# Patient Record
Sex: FEMALE | Race: WHITE | NOT HISPANIC OR LATINO | Employment: FULL TIME | URBAN - METROPOLITAN AREA
[De-identification: names, ages, dates, MRNs, and addresses within clinical notes are randomized per-mention and may not be internally consistent; named-entity substitution may affect disease eponyms.]

---

## 2017-06-22 ENCOUNTER — OFFICE VISIT (OUTPATIENT)
Dept: FAMILY MEDICINE | Facility: CLINIC | Age: 47
End: 2017-06-22
Payer: COMMERCIAL

## 2017-06-22 VITALS
BODY MASS INDEX: 33.06 KG/M2 | HEART RATE: 77 BPM | WEIGHT: 205.7 LBS | RESPIRATION RATE: 18 BRPM | SYSTOLIC BLOOD PRESSURE: 142 MMHG | DIASTOLIC BLOOD PRESSURE: 88 MMHG | TEMPERATURE: 98.2 F | HEIGHT: 66 IN

## 2017-06-22 DIAGNOSIS — Z00.00 ROUTINE HISTORY AND PHYSICAL EXAMINATION OF ADULT: Primary | ICD-10-CM

## 2017-06-22 DIAGNOSIS — I10 BENIGN ESSENTIAL HYPERTENSION: ICD-10-CM

## 2017-06-22 DIAGNOSIS — Z83.3 FAMILY HISTORY OF DIABETES MELLITUS: ICD-10-CM

## 2017-06-22 DIAGNOSIS — J45.20 INTERMITTENT ASTHMA, UNCOMPLICATED: ICD-10-CM

## 2017-06-22 LAB
ALBUMIN UR-MCNC: 30 MG/DL
APPEARANCE UR: CLEAR
BACTERIA #/AREA URNS HPF: ABNORMAL /HPF
BILIRUB UR QL STRIP: NEGATIVE
COLOR UR AUTO: YELLOW
ERYTHROCYTE [DISTWIDTH] IN BLOOD BY AUTOMATED COUNT: 12.1 % (ref 10–15)
GLUCOSE UR STRIP-MCNC: NEGATIVE MG/DL
HBA1C MFR BLD: 5.2 % (ref 4.3–6)
HCT VFR BLD AUTO: 38.8 % (ref 35–47)
HGB BLD-MCNC: 13.4 G/DL (ref 11.7–15.7)
HGB UR QL STRIP: ABNORMAL
KETONES UR STRIP-MCNC: NEGATIVE MG/DL
LEUKOCYTE ESTERASE UR QL STRIP: NEGATIVE
MCH RBC QN AUTO: 30.1 PG (ref 26.5–33)
MCHC RBC AUTO-ENTMCNC: 34.5 G/DL (ref 31.5–36.5)
MCV RBC AUTO: 87 FL (ref 78–100)
NITRATE UR QL: NEGATIVE
NON-SQ EPI CELLS #/AREA URNS LPF: ABNORMAL /LPF
PH UR STRIP: 5.5 PH (ref 5–7)
PLATELET # BLD AUTO: 306 10E9/L (ref 150–450)
RBC # BLD AUTO: 4.45 10E12/L (ref 3.8–5.2)
RBC #/AREA URNS AUTO: ABNORMAL /HPF (ref 0–2)
SP GR UR STRIP: 1.02 (ref 1–1.03)
URN SPEC COLLECT METH UR: ABNORMAL
UROBILINOGEN UR STRIP-ACNC: 0.2 EU/DL (ref 0.2–1)
WBC # BLD AUTO: 10.6 10E9/L (ref 4–11)
WBC #/AREA URNS AUTO: ABNORMAL /HPF (ref 0–2)

## 2017-06-22 PROCEDURE — 90715 TDAP VACCINE 7 YRS/> IM: CPT | Performed by: FAMILY MEDICINE

## 2017-06-22 PROCEDURE — 80053 COMPREHEN METABOLIC PANEL: CPT | Performed by: FAMILY MEDICINE

## 2017-06-22 PROCEDURE — 83036 HEMOGLOBIN GLYCOSYLATED A1C: CPT | Performed by: FAMILY MEDICINE

## 2017-06-22 PROCEDURE — 84443 ASSAY THYROID STIM HORMONE: CPT | Performed by: FAMILY MEDICINE

## 2017-06-22 PROCEDURE — 99396 PREV VISIT EST AGE 40-64: CPT | Mod: 25 | Performed by: FAMILY MEDICINE

## 2017-06-22 PROCEDURE — 36415 COLL VENOUS BLD VENIPUNCTURE: CPT | Performed by: FAMILY MEDICINE

## 2017-06-22 PROCEDURE — 99212 OFFICE O/P EST SF 10 MIN: CPT | Mod: 25 | Performed by: FAMILY MEDICINE

## 2017-06-22 PROCEDURE — 90471 IMMUNIZATION ADMIN: CPT | Performed by: FAMILY MEDICINE

## 2017-06-22 PROCEDURE — 81001 URINALYSIS AUTO W/SCOPE: CPT | Performed by: FAMILY MEDICINE

## 2017-06-22 PROCEDURE — 85027 COMPLETE CBC AUTOMATED: CPT | Performed by: FAMILY MEDICINE

## 2017-06-22 RX ORDER — IRBESARTAN 150 MG/1
150 TABLET ORAL DAILY
Qty: 30 TABLET | Refills: 1 | Status: SHIPPED | OUTPATIENT
Start: 2017-06-22 | End: 2017-07-17

## 2017-06-22 NOTE — LETTER
My Asthma Action Plan  Name: Eugenia Santoyo   YOB: 1970  Date: 6/22/2017   My doctor: Bianca Simon MD   My clinic: [facility]        My Control Medicine: NONE  My Rescue Medicine: NONE   My Asthma Severity: intermittent  Avoid your asthma triggers: humidity and exercise or sports               GREEN ZONE   Good Control    I feel good    No cough or wheeze    Can work, sleep and play without asthma symptoms       Take your asthma control medicine every day.     1. If exercise triggers your asthma, take your rescue medication    15 minutes before exercise or sports, and    During exercise if you have asthma symptoms  2. Spacer to use with inhaler: If you have a spacer, make sure to use it with your inhaler             YELLOW ZONE Getting Worse  I have ANY of these:    I do not feel good    Cough or wheeze    Chest feels tight    Wake up at night   1. Keep taking your Green Zone medications  2. Start taking your rescue medicine:    every 20 minutes for up to 1 hour. Then every 4 hours for 24-48 hours.  3. If you stay in the Yellow Zone for more than 12-24 hours, contact your doctor.  4. If you do not return to the Green Zone in 12-24 hours or you get worse, start taking your oral steroid medicine if prescribed by your provider.           RED ZONE Medical Alert - Get Help  I have ANY of these:    I feel awful    Medicine is not helping    Breathing getting harder    Trouble walking or talking    Nose opens wide to breathe       1. Take your rescue medicine NOW  2. If your provider has prescribed an oral steroid medicine, start taking it NOW  3. Call your doctor NOW  4. If you are still in the Red Zone after 20 minutes and you have not reached your doctor:    Take your rescue medicine again and    Call 911 or go to the emergency room right away    See your regular doctor within 2 weeks of an Emergency Room or Urgent Care visit for follow-up treatment.        Electronically signed by:  Kira Wall, June 22, 2017    Annual Reminders:  Meet with Asthma Educator,  Flu Shot in the Fall, consider Pneumonia Vaccination for patients with asthma (aged 19 and older).    Pharmacy:    [id number] - [place], [place] - [address] AT [facility] OF  [address]  [id number] - [place], [place] - [address] AT [facility] OF [address]                    Asthma Triggers  How To Control Things That Make Your Asthma Worse    Triggers are things that make your asthma worse.  Look at the list below to help you find your triggers and what you can do about them.  You can help prevent asthma flare-ups by staying away from your triggers.      Trigger                                                          What you can do   Cigarette Smoke  Tobacco smoke can make asthma worse. Do not allow smoking in your home, car or around you.  Be sure no one smokes at a child s day care or school.  If you smoke, ask your health care provider for ways to help you quit.  Ask family members to quit too.  Ask your health care provider for a referral to Quit Plan to help you quit smoking, or call 6-059-190-PLAN.     Colds, Flu, Bronchitis  These are common triggers of asthma. Wash your hands often.  Don t touch your eyes, nose or mouth.  Get a flu shot every year.     Dust Mites  These are tiny bugs that live in cloth or carpet. They are too small to see. Wash sheets and blankets in hot water every week.   Encase pillows and mattress in dust mite proof covers.  Avoid having carpet if you can. If you have carpet, vacuum weekly.   Use a dust mask and HEPA vacuum.   Pollen and Outdoor Mold  Some people are allergic to trees, grass, or weed pollen, or molds. Try to keep your windows closed.  Limit time out doors when pollen count is high.   Ask you health care provider about taking medicine during allergy season.     Animal Dander  Some people are allergic to skin flakes, urine or saliva from  pets with fur or feathers. Keep pets with fur or feathers out of your home.    If you can t keep the pet outdoors, then keep the pet out of your bedroom.  Keep the bedroom door closed.  Keep pets off cloth furniture and away from stuffed toys.     Mice, Rats, and Cockroaches  Some people are allergic to the waste from these pests.   Cover food and garbage.  Clean up spills and food crumbs.  Store grease in the refrigerator.   Keep food out of the bedroom.   Indoor Mold  This can be a trigger if your home has high moisture. Fix leaking faucets, pipes, or other sources of water.   Clean moldy surfaces.  Dehumidify basement if it is damp and smelly.   Smoke, Strong Odors, and Sprays  These can reduce air quality. Stay away from strong odors and sprays, such as perfume, powder, hair spray, paints, smoke incense, paint, cleaning products, candles and new carpet.   Exercise or Sports  Some people with asthma have this trigger. Be active!  Ask your doctor about taking medicine before sports or exercise to prevent symptoms.    Warm up for 5-10 minutes before and after sports or exercise.     Other Triggers of Asthma  Cold air:  Cover your nose and mouth with a scarf.  Sometimes laughing or crying can be a trigger.  Some medicines and food can trigger asthma.

## 2017-06-22 NOTE — NURSING NOTE
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    Weight as of this encounter: 205 lb 11.2 oz (93.3 kg).  Medication Reconciliation: oliver Wall CMA      "

## 2017-06-22 NOTE — MR AVS SNAPSHOT
After Visit Summary   6/22/2017    Eugenia Santoyo    MRN: [id number]           Patient Information     Date Of Birth          1970        Visit Information        Provider Department      6/22/2017 3:30 PM Bianca Simon MD [facility]        Today's Diagnoses     Routine history and physical examination of adult    -  1    Benign essential hypertension        Intermittent asthma, uncomplicated        Family history of diabetes mellitus          Care Instructions    You may schedule your mammogram at [facility] by calling [phone] and asking to schedule your mammogram or you may schedule with [facility] [place] in [place] by calling [phone].            Follow-ups after your visit        Future tests that were ordered for you today     Open Future Orders        Priority Expected Expires Ordered    MA Screening Digital Bilateral Routine  6/22/2018 6/22/2017            Who to contact     If you have questions or need follow up information about today's clinic visit or your schedule please contact [facility] directly at [phone].  Normal or non-critical lab and imaging results will be communicated to you by SolvAxishart, letter or phone within 4 business days after the clinic has received the results. If you do not hear from us within 7 days, please contact the clinic through [email or web address] or phone. If you have a critical or abnormal lab result, we will notify you by phone as soon as possible.  Submit refill requests through griddig or call your pharmacy and they will forward the refill request to us. Please allow 3 business days for your refill to be completed.          Additional Information About Your Visit        SolvAxishart Information     griddig gives you secure access to your electronic health record. If you see a primary care provider, you can also send messages to your care team and make appointments. If you have questions,  "please call your primary care clinic.  If you do not have a primary care provider, please call [phone] and they will assist you.        Care EveryWhere ID     This is your Care EveryWhere ID. This could be used by other organizations to access your [place] medical records  [id number]        Your Vitals Were     Pulse Temperature Respirations Height Last Period BMI (Body Mass Index)    77 98.2  F (36.8  C) (Oral) 18 5' 6\" (1.676 m) 04/11/2017 33.2 kg/m2       Blood Pressure from Last 3 Encounters:   06/22/17 142/88   05/11/16 118/72   12/04/14 120/78    Weight from Last 3 Encounters:   06/22/17 205 lb 11.2 oz (93.3 kg)   05/11/16 194 lb (88 kg)   12/04/14 191 lb (86.6 kg)              We Performed the Following     *UA reflex to Microscopic     CBC with platelets     Comprehensive metabolic panel     Hemoglobin A1c     TDAP VACCINE (ADACEL)     TSH with free T4 reflex          Today's Medication Changes          These changes are accurate as of: 6/22/17  4:22 PM.  If you have any questions, ask your nurse or doctor.               Start taking these medicines.        Dose/Directions    irbesartan 150 MG tablet   Commonly known as:  AVAPRO   Used for:  Benign essential hypertension   Started by:  Bianca Simon MD        Dose:  150 mg   Take 1 tablet (150 mg) by mouth daily   Quantity:  30 tablet   Refills:  1            Where to get your medicines      These medications were sent to [facility] [address] [place] [address] [address]  AT [facility] [address]  [address] [facility] [address]     Phone:  [phone]     irbesartan 150 MG tablet                Primary Care Provider Office Phone # Fax #    Bianca Simon [phone]       [facility] [address] [facility] [address]        Equal Access to Services     RICARDO GAYTAN AH: Hadii aad ku hadashrobi Sopacoali, waaxda luqadaha, qaybta kaalmada adeegyada, shantell guajardo ah. So " [facility] [phone].    ATENCIÓN: Si jensenla jenny, tiene a loomis disposición servicios gratuitos de asistencia lingüística. Za umanzor [phone].    We comply with applicable federal civil rights laws and [place] laws. We do not discriminate on the basis of race, color, national origin, age, disability sex, sexual orientation or gender identity.            Thank you!     Thank you for choosing [facility]  for your care. Our goal is always to provide you with excellent care. Hearing back from our patients is one way we can continue to improve our services. Please take a few minutes to complete the written survey that you may receive in the mail after your visit with us. Thank you!             Your Updated Medication List - Protect others around you: Learn how to safely use, store and throw away your medicines at www.disposemymeds.org.          This list is accurate as of: 6/22/17  4:22 PM.  Always use your most recent med list.                   Brand Name Dispense Instructions for use Diagnosis    irbesartan 150 MG tablet    AVAPRO    30 tablet    Take 1 tablet (150 mg) by mouth daily    Benign essential hypertension

## 2017-06-22 NOTE — PATIENT INSTRUCTIONS
You may schedule your mammogram at [facility] by calling [phone] and asking to schedule your mammogram or you may schedule with [facility] [place] in [place] by calling [phone].

## 2017-06-22 NOTE — LETTER
My Asthma Action Plan  Name: Eugenia Santoyo   YOB: 1970  Date: 6/26/2017   My doctor: Bianca Simon MD   My clinic: [facility]        My Control Medicine: NONE  My Rescue Medicine: Albuterol (Proair/Ventolin/Proventil) inhaler as needed   My Asthma Severity: intermittent  Avoid your asthma triggers: upper respiratory infections               GREEN ZONE   Good Control    I feel good    No cough or wheeze    Can work, sleep and play without asthma symptoms       Take your asthma control medicine every day.     1. If exercise triggers your asthma, take your rescue medication    15 minutes before exercise or sports, and    During exercise if you have asthma symptoms  2. Spacer to use with inhaler: If you have a spacer, make sure to use it with your inhaler             YELLOW ZONE Getting Worse  I have ANY of these:    I do not feel good    Cough or wheeze    Chest feels tight    Wake up at night   1. Keep taking your Green Zone medications  2. Start taking your rescue medicine:    every 20 minutes for up to 1 hour. Then every 4 hours for 24-48 hours.  3. If you stay in the Yellow Zone for more than 12-24 hours, contact your doctor.  4. If you do not return to the Green Zone in 12-24 hours or you get worse, start taking your oral steroid medicine if prescribed by your provider.           RED ZONE Medical Alert - Get Help  I have ANY of these:    I feel awful    Medicine is not helping    Breathing getting harder    Trouble walking or talking    Nose opens wide to breathe       1. Take your rescue medicine NOW  2. If your provider has prescribed an oral steroid medicine, start taking it NOW  3. Call your doctor NOW  4. If you are still in the Red Zone after 20 minutes and you have not reached your doctor:    Take your rescue medicine again and    Call 911 or go to the emergency room right away    See your regular doctor within 2 weeks of an Emergency Room or Urgent Care visit for  follow-up treatment.        Electronically signed by: Bianca Simon, June 26, 2017    Annual Reminders:  Meet with Asthma Educator,  Flu Shot in the Fall, consider Pneumonia Vaccination for patients with asthma (aged 19 and older).    Pharmacy:    [id number] - [place], [place] - [address] [facility] OF  [address]  [id number] - [place], [place] - [address] AT [facility] OF [address]                    Asthma Triggers  How To Control Things That Make Your Asthma Worse    Triggers are things that make your asthma worse.  Look at the list below to help you find your triggers and what you can do about them.  You can help prevent asthma flare-ups by staying away from your triggers.      Trigger                                                          What you can do   Cigarette Smoke  Tobacco smoke can make asthma worse. Do not allow smoking in your home, car or around you.  Be sure no one smokes at a child s day care or school.  If you smoke, ask your health care provider for ways to help you quit.  Ask family members to quit too.  Ask your health care provider for a referral to Quit Plan to help you quit smoking, or call 2-531-482-PLAN.     Colds, Flu, Bronchitis  These are common triggers of asthma. Wash your hands often.  Don t touch your eyes, nose or mouth.  Get a flu shot every year.     Dust Mites  These are tiny bugs that live in cloth or carpet. They are too small to see. Wash sheets and blankets in hot water every week.   Encase pillows and mattress in dust mite proof covers.  Avoid having carpet if you can. If you have carpet, vacuum weekly.   Use a dust mask and HEPA vacuum.   Pollen and Outdoor Mold  Some people are allergic to trees, grass, or weed pollen, or molds. Try to keep your windows closed.  Limit time out doors when pollen count is high.   Ask you health care provider about taking medicine during allergy season.     Animal Dander  Some people  are allergic to skin flakes, urine or saliva from pets with fur or feathers. Keep pets with fur or feathers out of your home.    If you can t keep the pet outdoors, then keep the pet out of your bedroom.  Keep the bedroom door closed.  Keep pets off cloth furniture and away from stuffed toys.     Mice, Rats, and Cockroaches  Some people are allergic to the waste from these pests.   Cover food and garbage.  Clean up spills and food crumbs.  Store grease in the refrigerator.   Keep food out of the bedroom.   Indoor Mold  This can be a trigger if your home has high moisture. Fix leaking faucets, pipes, or other sources of water.   Clean moldy surfaces.  Dehumidify basement if it is damp and smelly.   Smoke, Strong Odors, and Sprays  These can reduce air quality. Stay away from strong odors and sprays, such as perfume, powder, hair spray, paints, smoke incense, paint, cleaning products, candles and new carpet.   Exercise or Sports  Some people with asthma have this trigger. Be active!  Ask your doctor about taking medicine before sports or exercise to prevent symptoms.    Warm up for 5-10 minutes before and after sports or exercise.     Other Triggers of Asthma  Cold air:  Cover your nose and mouth with a scarf.  Sometimes laughing or crying can be a trigger.  Some medicines and food can trigger asthma.

## 2017-06-22 NOTE — PROGRESS NOTES
Screening Questionnaire for Adult Immunization    Are you sick today?   No   Do you have allergies to medications, food, a vaccine component or latex?   YES- Imitrex   Have you ever had a serious reaction after receiving a vaccination?   No   Do you have a long-term health problem with heart disease, lung disease, asthma, kidney disease, metabolic disease (e.g. diabetes), anemia, or other blood disorder?   No   Do you have cancer, leukemia, HIV/AIDS, or any other immune system problem?   No   In the past 3 months, have you taken medications that affect  your immune system, such as prednisone, other steroids, or anticancer drugs; drugs for the treatment of rheumatoid arthritis, Crohn s disease, or psoriasis; or have you had radiation treatments?   No   Have you had a seizure, or a brain or other nervous system problem?   No   During the past year, have you received a transfusion of blood or blood     products, or been given immune (gamma) globulin or antiviral drug?   No   For women: Are you pregnant or is there a chance you could become        pregnant during the next month?   No   Have you received any vaccinations in the past 4 weeks?   No     Immunization questionnaire was positive for at least one answer.  MD aware.      MNVFC doesn't apply on this patient    Per orders of Dr. Simon, injection of TDAP given by Kira Wall. Patient instructed to remain in clinic for 20 minutes afterwards, and to report any adverse reaction to me immediately.       Screening performed by Kira Wall on 6/22/2017 at 4:45 PM.

## 2017-06-22 NOTE — PROGRESS NOTES
SUBJECTIVE:     CC: Eugenia Santoyo is an 47 year old woman who presents for preventive health visit.     Physical   Annual:     Getting at least 3 servings of Calcium per day::  NO    Bi-annual eye exam::  Yes    Dental care twice a year::  Yes    Sleep apnea or symptoms of sleep apnea::  None    Diet::  Regular (no restrictions)    Frequency of exercise::  2-3 days/week    Duration of exercise::  30-45 minutes    Taking medications regularly::  Not Applicable    Additional concerns today::  YES  Hypertension   Diet::  Regular (no restrictions)  Frequency of exercise::  2-3 days/week  Duration of exercise::  30-45 minutes  Taking medications regularly::  Not Applicable  Additional concerns today::  YES        PROBLEMS TO ADD ON...    Today's PHQ-2 Score:   PHQ-2 ( 1999 Pfizer) 6/19/2017   Q1: Little interest or pleasure in doing things 0   Q2: Feeling down, depressed or hopeless 0   PHQ-2 Score 0   Q1: Little interest or pleasure in doing things Not at all   Q2: Feeling down, depressed or hopeless Not at all   PHQ-2 Score 0       Abuse: Current or Past(Physical, Sexual or Emotional)- No  Do you feel safe in your environment - Yes    Social History   Substance Use Topics     Smoking status: Never Smoker     Smokeless tobacco: Never Used     Alcohol use Yes      Comment: occas     The patient does not drink >3 drinks per day nor >7 drinks per week.    Recent Labs   Lab Test  05/11/16   1023  07/09/14   1041   CHOL  191  215*   HDL  49*  69   LDL  106*  118   TRIG  182*  141   CHOLHDLRATIO   --   3.1   NHDL  142*   --        Reviewed orders with patient.  Reviewed health maintenance and updated orders accordingly - Yes    Subjective:  Eugenia Santoyo is a 47 year old female  who presents today for a Physical Exam.  She has NOT had an abnormal PAP smear.  She has had her cholesterol checked in the past and it was good in 2016.  Her last mammogram was in 2014. She has no concerns at this time.    No current outpatient  "prescriptions on file.       Past Medical History:   Diagnosis Date     Benign heart murmur     since childhood     Intermittent asthma      Migraines        Past Surgical History:   Procedure Laterality Date     D & C      after miscarriage       Family History   Problem Relation Age of Onset     CANCER Mother      CLL--Chronic lymphatic luekemia     C.A.D. Father      MI at  ~40yrs old     CANCER Maternal Grandmother      ? type of cancer,  50's       Social History   Substance Use Topics     Smoking status: Never Smoker     Smokeless tobacco: Never Used     Alcohol use Yes      Comment: occas       Objective:  Blood pressure 142/88, pulse 77, temperature 98.2  F (36.8  C), temperature source Oral, resp. rate 18, height 5' 6\" (1.676 m), weight 205 lb 11.2 oz (93.3 kg), last menstrual period 2017, not currently breastfeeding.  General appearance: Healthy.  Mental Status: cooperative, normal affect, no gross thought process defects.  HEENT:   Ears- Normal external canals and TMs  Eyes- PERRL, EOM's intact, fundi benign, sharp disc margins.  Throat- Normal   Neck- no carotid bruits noted  Nodes- Negative  Thyroid: Normal to palpation, no enlargement or nodules noted.  Breasts: Symmetric without mass tenderness or discharge.  Axillary nodes negative.  Lungs: Clear to auscultation bilaterally  Heart.: Normal rate and rhythm.  No murmurs, clicks or gallops.  Pulses:    R-4/4, PT-4/4, DP-4/4  Abdomen: BS active. Soft, non-tender, no masses or organomegaly.  Aorta normal. No bruits.  Genitalia: Normal female external genitalia without lesions.  Speculum:  Cervix normal,  Pap taken, Bimanual exam - normal size uterus, no adenexal mass or tenderness.  Extremities: Normal without edema  Reflexes:  Symmetric bilaterally at the patella  Skin: Clear and free of rash.    Assessment:  1. Eugenia Santoyo is a healthy 47 year old female here for health care maintainence issues.  2. hypertension - has been running " high at work too  3. Asthma - stable  4. Headache - none for 2 years  5. Family hx of diabetes    Plan:  1. A Pap smear was NOT obtained  2. Labs ordered per [facility] orders  3. Screening Mammogram was ordered  4. Will start avapro  5. The potential side effects of the prescription medication were discussed with the patient.  6. tdap today  7.  Follow up in 2 months and as needed      Bianca Simon MD  [facility]

## 2017-06-23 LAB
ALBUMIN SERPL-MCNC: 4 G/DL (ref 3.4–5)
ALP SERPL-CCNC: 107 U/L (ref 40–150)
ALT SERPL W P-5'-P-CCNC: 21 U/L (ref 0–50)
ANION GAP SERPL CALCULATED.3IONS-SCNC: 9 MMOL/L (ref 3–14)
AST SERPL W P-5'-P-CCNC: 16 U/L (ref 0–45)
BILIRUB SERPL-MCNC: 0.5 MG/DL (ref 0.2–1.3)
BUN SERPL-MCNC: 15 MG/DL (ref 7–30)
CALCIUM SERPL-MCNC: 9.3 MG/DL (ref 8.5–10.1)
CHLORIDE SERPL-SCNC: 105 MMOL/L (ref 94–109)
CO2 SERPL-SCNC: 24 MMOL/L (ref 20–32)
CREAT SERPL-MCNC: 0.73 MG/DL (ref 0.52–1.04)
GFR SERPL CREATININE-BSD FRML MDRD: 84 ML/MIN/1.7M2
GLUCOSE SERPL-MCNC: 100 MG/DL (ref 70–99)
POTASSIUM SERPL-SCNC: 3.8 MMOL/L (ref 3.4–5.3)
PROT SERPL-MCNC: 7.6 G/DL (ref 6.8–8.8)
SODIUM SERPL-SCNC: 138 MMOL/L (ref 133–144)
TSH SERPL DL<=0.005 MIU/L-ACNC: 0.72 MU/L (ref 0.4–4)

## 2017-06-23 ASSESSMENT — ASTHMA QUESTIONNAIRES: ACT_TOTALSCORE: 25

## 2017-06-26 PROBLEM — I10 BENIGN ESSENTIAL HYPERTENSION: Status: ACTIVE | Noted: 2017-06-26

## 2017-07-17 ENCOUNTER — HOSPITAL ENCOUNTER (OUTPATIENT)
Dept: MAMMOGRAPHY | Facility: CLINIC | Age: 47
Discharge: HOME | End: 2017-07-17
Attending: FAMILY MEDICINE | Admitting: FAMILY MEDICINE
Payer: COMMERCIAL

## 2017-07-17 ENCOUNTER — OFFICE VISIT (OUTPATIENT)
Dept: FAMILY MEDICINE | Facility: CLINIC | Age: 47
End: 2017-07-17
Payer: COMMERCIAL

## 2017-07-17 VITALS
RESPIRATION RATE: 14 BRPM | DIASTOLIC BLOOD PRESSURE: 81 MMHG | BODY MASS INDEX: 32.88 KG/M2 | HEIGHT: 66 IN | TEMPERATURE: 98.1 F | WEIGHT: 204.6 LBS | HEART RATE: 75 BPM | OXYGEN SATURATION: 97 % | SYSTOLIC BLOOD PRESSURE: 119 MMHG

## 2017-07-17 DIAGNOSIS — I10 BENIGN ESSENTIAL HYPERTENSION: ICD-10-CM

## 2017-07-17 DIAGNOSIS — Z00.00 ROUTINE HISTORY AND PHYSICAL EXAMINATION OF ADULT: ICD-10-CM

## 2017-07-17 PROCEDURE — G0202 SCR MAMMO BI INCL CAD: HCPCS

## 2017-07-17 PROCEDURE — 99213 OFFICE O/P EST LOW 20 MIN: CPT | Performed by: FAMILY MEDICINE

## 2017-07-17 RX ORDER — IRBESARTAN 150 MG/1
150 TABLET ORAL DAILY
Qty: 90 TABLET | Refills: 3 | Status: SHIPPED | OUTPATIENT
Start: 2017-07-17 | End: 2018-07-20

## 2017-07-17 NOTE — NURSING NOTE
"Chief Complaint   Patient presents with     Hypertension     recheck       Initial /81 (BP Location: Left arm, Patient Position: Chair, Cuff Size: Adult Large)  Pulse 75  Temp 98.1  F (36.7  C) (Oral)  Resp 14  Ht 5' 6\" (1.676 m)  Wt 204 lb 9.6 oz (92.8 kg)  LMP 04/11/2017  SpO2 97%  BMI 33.02 kg/m2 Estimated body mass index is 33.02 kg/(m^2) as calculated from the following:    Height as of this encounter: 5' 6\" (1.676 m).    Weight as of this encounter: 204 lb 9.6 oz (92.8 kg).  Medication Reconciliation: complete     Rafa Wall CMA      "

## 2017-07-17 NOTE — MR AVS SNAPSHOT
"              After Visit Summary   7/17/2017    Eugenia Santoyo    MRN: [id number]           Patient Information     Date Of Birth          1970        Visit Information        Provider Department      7/17/2017 1:15 PM Bianca Simon MD [facility]        Today's Diagnoses     Benign essential hypertension           Follow-ups after your visit        Who to contact     If you have questions or need follow up information about today's clinic visit or your schedule please contact [facility] directly at [phone].  Normal or non-critical lab and imaging results will be communicated to you by MyChart, letter or phone within 4 business days after the clinic has received the results. If you do not hear from us within 7 days, please contact the clinic through [facility] or phone. If you have a critical or abnormal lab result, we will notify you by phone as soon as possible.  Submit refill requests through [employer]Bridge or call your pharmacy and they will forward the refill request to us. Please allow 3 business days for your refill to be completed.          Additional Information About Your Visit        MyChart Information     [facility]Bridge gives you secure access to your electronic health record. If you see a primary care provider, you can also send messages to your care team and make appointments. If you have questions, please call your primary care clinic.  If you do not have a primary care provider, please call [phone] and they will assist you.        Care EveryWhere ID     This is your Care EveryWhere ID. This could be used by other organizations to access your [place] medical records  [id number]        Your Vitals Were     Pulse Temperature Respirations Height Last Period Pulse Oximetry    75 98.1  F (36.7  C) (Oral) 14 5' 6\" (1.676 m) 04/11/2017 97%    BMI (Body Mass Index)                   33.02 kg/m2            Blood Pressure from Last 3 Encounters:   07/17/17 119/81 "   06/22/17 142/88   05/11/16 118/72    Weight from Last 3 Encounters:   07/17/17 204 lb 9.6 oz (92.8 kg)   06/22/17 205 lb 11.2 oz (93.3 kg)   05/11/16 194 lb (88 kg)              Today, you had the following     No orders found for display         Where to get your medicines      These medications were sent to [facility] [id number] [facility] [address]  AT [facility] [address]  [address] [facility] [address]     Phone:  [phone]     irbesartan 150 MG tablet          Primary Care Provider Office Phone # Fax #    Bianca Simon [phone]       [facility] [address] [facility] [address]        Equal Access to Services     RICARDO GAYTAN : Hadii nona ku hadasho Soomaali, waaxda luqadaha, qaybta kaalmada adeegyada, waxay semajin hayaan brit guajardo . So [facility] [phone].    ATENCIÓN: Si habla español, tiene a loomis disposición servicios gratuitos de asistencia lingüística. Llame al [phone].    We comply with applicable federal civil rights laws and [place] laws. We do not discriminate on the basis of race, color, national origin, age, disability sex, sexual orientation or gender identity.            Thank you!     Thank you for choosing [facility]  for your care. Our goal is always to provide you with excellent care. Hearing back from our patients is one way we can continue to improve our services. Please take a few minutes to complete the written survey that you may receive in the mail after your visit with us. Thank you!             Your Updated Medication List - Protect others around you: Learn how to safely use, store and throw away your medicines at www.disposemymeds.org.          This list is accurate as of: 7/17/17  1:32 PM.  Always use your most recent med list.                   Brand Name Dispense Instructions for use Diagnosis    irbesartan 150 MG tablet    AVAPRO    90 tablet    Take 1 tablet (150 mg)  by mouth daily    Benign essential hypertension

## 2017-07-17 NOTE — PROGRESS NOTES
"  SUBJECTIVE:                                                    Eugenia Santoyo is a 47 year old female who presents to clinic today for the following health issues:      Hypertension Follow-up      Outpatient blood pressures are not being checked.    Low Salt Diet: no added salt      Amount of exercise or physical activity: 3 days per week. This past month she has not been able to exercise a lot due to  having surgery    Problems taking medications regularly: No    Medication side effects: none    Diet: regular (no restrictions)    Past Medical History:   Diagnosis Date     Benign heart murmur     since childhood     Intermittent asthma      Migraines        Past Surgical History:   Procedure Laterality Date     D & C      after miscarriage       MEDICATIONS:  Current Outpatient Prescriptions   Medication     irbesartan (AVAPRO) 150 MG tablet     No current facility-administered medications for this visit.        SOCIAL HISTORY:  Social History   Substance Use Topics     Smoking status: Never Smoker     Smokeless tobacco: Never Used     Alcohol use Yes      Comment: occas       Family History   Problem Relation Age of Onset     CANCER Mother      CLL--Chronic lymphatic luekemia     C.A.D. Father      MI at  ~40yrs old     CANCER Maternal Grandmother      ? type of cancer,  50's       Objective:  Blood pressure 119/81, pulse 75, temperature 98.1  F (36.7  C), temperature source Oral, resp. rate 14, height 5' 6\" (1.676 m), weight 204 lb 9.6 oz (92.8 kg), last menstrual period 2017, SpO2 97 %, not currently breastfeeding.  Neck:  There is no lymphadenopathy or thyroid tenderness or enlargement  Chest: Clear to auscultation bilaterally.  No wheezes, rales or retractions.  CV: Regular rate and rhythm without murmurs, rubs or gallops.  Extremities: There is no clubbing, cyanosis or edema.  Peripheral pulses are present bilaterally in the radial and dorsalis pedis arteries.'    Assessment:  1. " hypertension - under good control      Plan:  1. Refills per epicare  2. Recheck in 6-12 months

## 2018-07-20 DIAGNOSIS — I10 BENIGN ESSENTIAL HYPERTENSION: ICD-10-CM

## 2018-07-20 NOTE — TELEPHONE ENCOUNTER
"Requested Prescriptions   Pending Prescriptions Disp Refills     irbesartan (AVAPRO) 150 MG tablet [Pharmacy Med Name: IRBESARTAN 150MG TABLETS] 90 tablet 0    Last Written Prescription Date:  7/17/17  Last Fill Quantity: 90,  # refills: 3   Last Office Visit: 7/17/2017 Alfred  Future Office Visit:      Sig: TAKE 1 TABLET(150 MG) BY MOUTH DAILY    Angiotensin-II Receptors Failed    7/20/2018  3:26 AM       Failed - Blood pressure under 140/90 in past 12 months    BP Readings from Last 3 Encounters:   07/17/17 119/81   06/22/17 142/88   05/11/16 118/72                Failed - Recent (12 mo) or future (30 days) visit within the authorizing provider's specialty    Patient had office visit in the last 12 months or has a visit in the next 30 days with authorizing provider or within the authorizing provider's specialty.  See \"Patient Info\" tab in inbasket, or \"Choose Columns\" in Meds & Orders section of the refill encounter.           Failed - Normal serum creatinine on file in past 12 months    Recent Labs   Lab Test  06/22/17   1625   CR  0.73            Failed - Normal serum potassium on file in past 12 months    Recent Labs   Lab Test  06/22/17   1625   POTASSIUM  3.8                   Passed - Patient is age 18 or older       Passed - No active pregnancy on record       Passed - No positive pregnancy test in past 12 months          "

## 2018-07-20 NOTE — LETTER
July 23, 2018          Eugenia Santoyo  [address] [facility] [address]        Dear Eugenia,       We sent a one month refill of irbesartan to your pharmacy today. This is a reminder to schedule an office visit with your provider before the next refill. Your last appointment was 7/17/2017.    Taking care of your health is important to us and ongoing visits with a provider are vital to your care. Please let us know if you have any questions about this reminder.     Sincerely,     JARRELL Taveras - Care Team of Jac Valenzuela MD

## 2018-07-23 RX ORDER — IRBESARTAN 150 MG/1
TABLET ORAL
Qty: 30 TABLET | Refills: 0 | Status: SHIPPED | OUTPATIENT
Start: 2018-07-23 | End: 2018-08-20

## 2018-07-23 NOTE — TELEPHONE ENCOUNTER
Medication is being filled for 1 time refill only due to:  Patient needs to be seen because it has been more than one year since last visit.    Mail is preferred communication, no others checked  Letter mailed.   Nitin Mae RN

## 2018-08-20 ENCOUNTER — OFFICE VISIT (OUTPATIENT)
Dept: FAMILY MEDICINE | Facility: CLINIC | Age: 48
End: 2018-08-20
Payer: COMMERCIAL

## 2018-08-20 VITALS
SYSTOLIC BLOOD PRESSURE: 122 MMHG | HEIGHT: 65 IN | DIASTOLIC BLOOD PRESSURE: 78 MMHG | WEIGHT: 204.8 LBS | OXYGEN SATURATION: 98 % | HEART RATE: 63 BPM | TEMPERATURE: 98.4 F | RESPIRATION RATE: 14 BRPM | BODY MASS INDEX: 34.12 KG/M2

## 2018-08-20 DIAGNOSIS — Z00.00 ROUTINE GENERAL MEDICAL EXAMINATION AT A HEALTH CARE FACILITY: Primary | ICD-10-CM

## 2018-08-20 DIAGNOSIS — Z83.3 FAMILY HISTORY OF DIABETES MELLITUS: ICD-10-CM

## 2018-08-20 DIAGNOSIS — Z11.4 SCREENING FOR HIV (HUMAN IMMUNODEFICIENCY VIRUS): ICD-10-CM

## 2018-08-20 DIAGNOSIS — Z12.11 SCREEN FOR COLON CANCER: ICD-10-CM

## 2018-08-20 DIAGNOSIS — J45.20 MILD INTERMITTENT ASTHMA WITHOUT COMPLICATION: ICD-10-CM

## 2018-08-20 DIAGNOSIS — I10 BENIGN ESSENTIAL HYPERTENSION: ICD-10-CM

## 2018-08-20 DIAGNOSIS — G43.829 MENSTRUAL MIGRAINE WITHOUT STATUS MIGRAINOSUS, NOT INTRACTABLE: ICD-10-CM

## 2018-08-20 DIAGNOSIS — R63.5 WEIGHT GAIN: ICD-10-CM

## 2018-08-20 LAB — HBA1C MFR BLD: 5.2 % (ref 0–5.6)

## 2018-08-20 PROCEDURE — 99213 OFFICE O/P EST LOW 20 MIN: CPT | Mod: 25 | Performed by: FAMILY MEDICINE

## 2018-08-20 PROCEDURE — 84443 ASSAY THYROID STIM HORMONE: CPT | Performed by: FAMILY MEDICINE

## 2018-08-20 PROCEDURE — 99396 PREV VISIT EST AGE 40-64: CPT | Performed by: FAMILY MEDICINE

## 2018-08-20 PROCEDURE — 36415 COLL VENOUS BLD VENIPUNCTURE: CPT | Performed by: FAMILY MEDICINE

## 2018-08-20 PROCEDURE — 83036 HEMOGLOBIN GLYCOSYLATED A1C: CPT | Performed by: FAMILY MEDICINE

## 2018-08-20 PROCEDURE — 80061 LIPID PANEL: CPT | Performed by: FAMILY MEDICINE

## 2018-08-20 RX ORDER — IRBESARTAN 150 MG/1
TABLET ORAL
Qty: 90 TABLET | Refills: 3 | Status: SHIPPED | OUTPATIENT
Start: 2018-08-20 | End: 2019-08-22

## 2018-08-20 RX ORDER — TOPIRAMATE 25 MG/1
TABLET, FILM COATED ORAL
Qty: 70 TABLET | Refills: 0 | Status: SHIPPED | OUTPATIENT
Start: 2018-08-20 | End: 2018-10-31

## 2018-08-20 NOTE — MR AVS SNAPSHOT
After Visit Summary   8/20/2018    Eugenia Santoyo    MRN: [id number]           Patient Information     Date Of Birth          1970        Visit Information        Provider Department      8/20/2018 11:30 AM Bianca Simon MD [facility]        Today's Diagnoses     Routine general medical examination at a health care facility    -  1    Screen for colon cancer        Screening for HIV (human immunodeficiency virus)        Mild intermittent asthma without complication        Benign essential hypertension        Menstrual migraine without status migrainosus, not intractable        Family history of diabetes mellitus        Weight gain          Care Instructions      Preventive Health Recommendations  Female Ages 50 - 64    Yearly exam: See your health care provider every year in order to  o Review health changes.   o Discuss preventive care.    o Review your medicines if your doctor has prescribed any.      Get a Pap test every three years (unless you have an abnormal result and your provider advises testing more often).    If you get Pap tests with HPV test, you only need to test every 5 years, unless you have an abnormal result.     You do not need a Pap test if your uterus was removed (hysterectomy) and you have not had cancer.    You should be tested each year for STDs (sexually transmitted diseases) if you're at risk.     Have a mammogram every 1 to 2 years.    Have a colonoscopy at age 50, or have a yearly FIT test (stool test). These exams screen for colon cancer.      Have a cholesterol test every 5 years, or more often if advised.    Have a diabetes test (fasting glucose) every three years. If you are at risk for diabetes, you should have this test more often.     If you are at risk for osteoporosis (brittle bone disease), think about having a bone density scan (DEXA).    Shots: Get a flu shot each year. Get a tetanus shot every 10 years.    Nutrition:     Eat at least 5  servings of fruits and vegetables each day.    Eat whole-grain bread, whole-wheat pasta and brown rice instead of white grains and rice.    Get adequate Calcium and Vitamin D.     Lifestyle    Exercise at least 150 minutes a week (30 minutes a day, 5 days a week). This will help you control your weight and prevent disease.    Limit alcohol to one drink per day.    No smoking.     Wear sunscreen to prevent skin cancer.     See your dentist every six months for an exam and cleaning.    See your eye doctor every 1 to 2 years.      You may schedule your mammogram at [facility] by calling [phone] and asking to schedule your mammogram or you may schedule with [facility] in [place] by calling [phone].            Follow-ups after your visit        Follow-up notes from your care team     Return in about 3 months (around 11/20/2018) for Medication recheck.      Future tests that were ordered for you today     Open Future Orders        Priority Expected Expires Ordered    *MA Screening Digital Bilateral Routine  8/20/2019 8/20/2018            Who to contact     If you have questions or need follow up information about today's clinic visit or your schedule please contact [facility] directly at [phone].  Normal or non-critical lab and imaging results will be communicated to you by MyChart, letter or phone within 4 business days after the clinic has received the results. If you do not hear from us within 7 days, please contact the clinic through [facility] or phone. If you have a critical or abnormal lab result, we will notify you by phone as soon as possible.  Submit refill requests through [email or web address] or call your pharmacy and they will forward the refill request to us. Please allow 3 business days for your refill to be completed.          Additional Information About Your Visit        LY.com Information     [email or web address] gives you secure access to your electronic  "health record. If you see a primary care provider, you can also send messages to your care team and make appointments. If you have questions, please call your primary care clinic.  If you do not have a primary care provider, please call [phone] and they will assist you.        Care EveryWhere ID     This is your Care EveryWhere ID. This could be used by other organizations to access your [place] medical records  [id number]        Your Vitals Were     Pulse Temperature Respirations Height Last Period Pulse Oximetry    63 98.4  F (36.9  C) (Oral) 14 5' 5\" (1.651 m) 06/01/2018 98%    Breastfeeding? BMI (Body Mass Index)                No 34.08 kg/m2           Blood Pressure from Last 3 Encounters:   08/20/18 122/78   07/17/17 119/81   06/22/17 142/88    Weight from Last 3 Encounters:   08/20/18 204 lb 12.8 oz (92.9 kg)   07/17/17 204 lb 9.6 oz (92.8 kg)   06/22/17 205 lb 11.2 oz (93.3 kg)              We Performed the Following     Hemoglobin A1c     Lipid panel reflex to direct LDL Fasting     TSH with free T4 reflex          Today's Medication Changes          These changes are accurate as of 8/20/18 12:28 PM.  If you have any questions, ask your nurse or doctor.               Start taking these medicines.        Dose/Directions    topiramate 25 MG tablet   Commonly known as:  TOPAMAX   Used for:  Weight gain, Menstrual migraine without status migrainosus, not intractable   Started by:  Bianca Simon MD        Take 1 tablet (25 mg) at bedtime for 1 week, then 1 tablet twice daily for 1 week, then 1 tablet in AM and 2 in PM for 1 week, then 2 tablets twice daily.   Quantity:  70 tablet   Refills:  0            Where to get your medicines      These medications were sent to [facility] [address] [employer] [address] AT [facility] [address] [facility] [address]     Phone:  [phone]     irbesartan 150 MG tablet         Some of these will need a paper " prescription and others can be bought over the counter.  Ask your nurse if you have questions.     Bring a paper prescription for each of these medications     topiramate 25 MG tablet                Primary Care Provider Office Phone # Fax #    Bianca Simon -[phone] [address] [facility] [address]        Equal Access to Services     RICARDO GAYTAN : Hadii aad ku hadasho Soomaali, waaxda luqadaha, qaybta kaalmada adeegyada, waxay idiin hayaan adeeg kharash lacarolina . So [facility] [phone].    ATENCIÓN: Si habla español, tiene a loomis disposición servicios gratuitos de asistencia lingüística. Llame al [phone].    We comply with applicable federal civil rights laws and [place] laws. We do not discriminate on the basis of race, color, national origin, age, disability, sex, sexual orientation, or gender identity.            Thank you!     Thank you for choosing [facility]  for your care. Our goal is always to provide you with excellent care. Hearing back from our patients is one way we can continue to improve our services. Please take a few minutes to complete the written survey that you may receive in the mail after your visit with us. Thank you!             Your Updated Medication List - Protect others around you: Learn how to safely use, store and throw away your medicines at www.disposemymeds.org.          This list is accurate as of 8/20/18 12:28 PM.  Always use your most recent med list.                   Brand Name Dispense Instructions for use Diagnosis    irbesartan 150 MG tablet    AVAPRO    90 tablet    TAKE 1 TABLET(150 MG) BY MOUTH DAILY    Benign essential hypertension       topiramate 25 MG tablet    TOPAMAX    70 tablet    Take 1 tablet (25 mg) at bedtime for 1 week, then 1 tablet twice daily for 1 week, then 1 tablet in AM and 2 in PM for 1 week, then 2 tablets twice daily.    Weight gain, Menstrual migraine without status migrainosus, not intractable

## 2018-08-20 NOTE — LETTER
My Asthma Action Plan  Name: Eugenia Santoyo   YOB: 1970  Date: 8/20/2018   My doctor: Bianca Simon MD   My clinic: [facility]        My Control Medicine: None  My Rescue Medicine: none   My Asthma Severity: intermittent  Avoid your asthma triggers: smoke and upper respiratory infections               GREEN ZONE   Good Control    I feel good    No cough or wheeze    Can work, sleep and play without asthma symptoms       Take your asthma control medicine every day.     1. If exercise triggers your asthma, take your rescue medication    15 minutes before exercise or sports, and    During exercise if you have asthma symptoms  2. Spacer to use with inhaler: If you have a spacer, make sure to use it with your inhaler             YELLOW ZONE Getting Worse  I have ANY of these:    I do not feel good    Cough or wheeze    Chest feels tight    Wake up at night   1. Keep taking your Green Zone medications  2. Start taking your rescue medicine:    every 20 minutes for up to 1 hour. Then every 4 hours for 24-48 hours.  3. If you stay in the Yellow Zone for more than 12-24 hours, contact your doctor.  4. If you do not return to the Green Zone in 12-24 hours or you get worse, start taking your oral steroid medicine if prescribed by your provider.           RED ZONE Medical Alert - Get Help  I have ANY of these:    I feel awful    Medicine is not helping    Breathing getting harder    Trouble walking or talking    Nose opens wide to breathe       1. Take your rescue medicine NOW  2. If your provider has prescribed an oral steroid medicine, start taking it NOW  3. Call your doctor NOW  4. If you are still in the Red Zone after 20 minutes and you have not reached your doctor:    Take your rescue medicine again and    Call 911 or go to the emergency room right away    See your regular doctor within 2 weeks of an Emergency Room or Urgent Care visit for follow-up treatment.          Annual Reminders:   Meet with Asthma Educator,  Flu Shot in the Fall, consider Pneumonia Vaccination for patients with asthma (aged 19 and older).    Pharmacy:    [facility] - [place], [place] - [address] AT [facility] OF  [address]  [facility] - [place], [place] - [address] AT [facility] [address]                      Asthma Triggers  How To Control Things That Make Your Asthma Worse    Triggers are things that make your asthma worse.  Look at the list below to help you find your triggers and what you can do about them.  You can help prevent asthma flare-ups by staying away from your triggers.      Trigger                                                          What you can do   Cigarette Smoke  Tobacco smoke can make asthma worse. Do not allow smoking in your home, car or around you.  Be sure no one smokes at a child s day care or school.  If you smoke, ask your health care provider for ways to help you quit.  Ask family members to quit too.  Ask your health care provider for a referral to Quit Plan to help you quit smoking, or call 5-983-455-PLAN.     Colds, Flu, Bronchitis  These are common triggers of asthma. Wash your hands often.  Don t touch your eyes, nose or mouth.  Get a flu shot every year.     Dust Mites  These are tiny bugs that live in cloth or carpet. They are too small to see. Wash sheets and blankets in hot water every week.   Encase pillows and mattress in dust mite proof covers.  Avoid having carpet if you can. If you have carpet, vacuum weekly.   Use a dust mask and HEPA vacuum.   Pollen and Outdoor Mold  Some people are allergic to trees, grass, or weed pollen, or molds. Try to keep your windows closed.  Limit time out doors when pollen count is high.   Ask you health care provider about taking medicine during allergy season.     Animal Dander  Some people are allergic to skin flakes, urine or saliva from pets with fur or feathers. Keep pets with fur or  feathers out of your home.    If you can t keep the pet outdoors, then keep the pet out of your bedroom.  Keep the bedroom door closed.  Keep pets off cloth furniture and away from stuffed toys.     Mice, Rats, and Cockroaches  Some people are allergic to the waste from these pests.   Cover food and garbage.  Clean up spills and food crumbs.  Store grease in the refrigerator.   Keep food out of the bedroom.   Indoor Mold  This can be a trigger if your home has high moisture. Fix leaking faucets, pipes, or other sources of water.   Clean moldy surfaces.  Dehumidify basement if it is damp and smelly.   Smoke, Strong Odors, and Sprays  These can reduce air quality. Stay away from strong odors and sprays, such as perfume, powder, hair spray, paints, smoke incense, paint, cleaning products, candles and new carpet.   Exercise or Sports  Some people with asthma have this trigger. Be active!  Ask your doctor about taking medicine before sports or exercise to prevent symptoms.    Warm up for 5-10 minutes before and after sports or exercise.     Other Triggers of Asthma  Cold air:  Cover your nose and mouth with a scarf.  Sometimes laughing or crying can be a trigger.  Some medicines and food can trigger asthma.

## 2018-08-20 NOTE — PROGRESS NOTES
SUBJECTIVE:   CC: Eugenia Santoyo is an 48 year old woman who presents for preventive health visit.     Physical   Annual:     Getting at least 3 servings of Calcium per day:  NO    Bi-annual eye exam:  Yes    Dental care twice a year:  Yes    Sleep apnea or symptoms of sleep apnea:  None    Diet:  Regular (no restrictions)    Frequency of exercise:  4-5 days/week    Duration of exercise:  45-60 minutes    Taking medications regularly:  Yes    Medication side effects:  None    Additional concerns today:  No        Today's PHQ-2 Score:   PHQ-2 ( 1999 Pf[employer]) 8/19/2018   Q1: Little interest or pleasure in doing things 0   Q2: Feeling down, depressed or hopeless 0   PHQ-2 Score 0   Q1: Little interest or pleasure in doing things Not at all   Q2: Feeling down, depressed or hopeless Not at all   PHQ-2 Score 0       Abuse: Current or Past(Physical, Sexual or Emotional)- No  Do you feel safe in your environment - Yes    Social History   Substance Use Topics     Smoking status: Never Smoker     Smokeless tobacco: Never Used     Alcohol use Yes      Comment: occas     Alcohol Use 8/19/2018   If you drink alcohol do you typically have greater than 3 drinks per day OR greater than 7 drinks per week? No       Reviewed orders with patient.  Reviewed health maintenance and updated orders accordingly - Yes  Labs reviewed in [facility]    Patient over age 50, mutual decision to screen reflected in health maintenance.    Pertinent mammograms are reviewed under the imaging tab.  History of abnormal Pap smear: NO - age 30- 65 PAP every 3 years recommended  PAP / HPV Latest Ref Rng & Units 5/11/2016 9/21/2009 8/22/2007   PAP - NIL NIL NIL   HPV 16 DNA NEG Negative - -   HPV 18 DNA NEG Negative - -   OTHER HR HPV NEG Negative - -     Reviewed and updated as needed this visit by clinical staff  Tobacco  Allergies  Med Hx  Surg Hx  Fam Hx  Soc Hx        Reviewed and updated as needed this visit by Provider        Subjective:  Eugenia APPLE  "Yarelis is a 48 year old female  who presents today for a Physical Exam.  She has not had an abnormal PAP smear.  She has had her cholesterol checked in the past and it was borderline 2-3 years ago.  Her last mammogram was 2 years ago. She has no concerns at this time.    Current Outpatient Prescriptions   Medication Sig Dispense Refill     irbesartan (AVAPRO) 150 MG tablet TAKE 1 TABLET(150 MG) BY MOUTH DAILY 30 tablet 0       Past Medical History:   Diagnosis Date     Benign heart murmur     since childhood     Intermittent asthma      Migraines        Past Surgical History:   Procedure Laterality Date     D & C      after miscarriage       Family History   Problem Relation Age of Onset     Cancer Mother      CLL--Chronic lymphatic luekemia     C.A.D. Father      MI at  ~40yrs old     Cancer Maternal Grandmother      ? type of cancer,  50's       Social History   Substance Use Topics     Smoking status: Never Smoker     Smokeless tobacco: Never Used     Alcohol use Yes      Comment: occas       Objective:  Blood pressure 122/78, pulse 63, temperature 98.4  F (36.9  C), temperature source Oral, resp. rate 14, height 5' 5\" (1.651 m), weight 204 lb 12.8 oz (92.9 kg), last menstrual period 2018, SpO2 98 %, not currently breastfeeding. Body mass index is 34.08 kg/(m^2).   General appearance: Healthy.  Mental Status: cooperative, normal affect, no gross thought process defects.  HEENT:   Ears- Normal external canals and TMs  Eyes- PERRL, EOM's intact, fundi benign, sharp disc margins.  Throat- Normal   Neck- no carotid bruits noted  Nodes- Negative  Thyroid: Normal to palpation, no enlargement or nodules noted.  Breasts: Symmetric without mass tenderness or discharge.  Axillary nodes negative.  Lungs: Clear to auscultation bilaterally  Heart.: Normal rate and rhythm.  No murmurs, clicks or gallops.  Pulses:    R-4/4, PT-4/4, DP-4/4  Abdomen: BS active. Soft, non-tender, no masses or organomegaly.  Aorta " normal. No bruits.  Genitalia: Not due this time  Extremities: Normal without edema  Reflexes:  Symmetric bilaterally at the patella  Skin: Clear and free of rash.    Assessment:  1. Eugenia Santoyo is a healthy 48 year old female here for health care maintainence issues.  2. Weight gain and hard time losing weight  3. Migraines - have been better lately  4. Family hx of diabetes  5. Asthma - under control  6. hypertension = under good control     Plan:  1. A Pap smear was not obtained  2. Labs ordered per [facility] orders  3. Screening Mammogram was ordered  4. Refills per epicare  5. Will try topiramate  6. The potential side effects of the prescription medication were discussed with the patient.  7. Recheck in 3 months  8. A screening colonoscopy was recommended and referral offered to the patient.  9.  Patient is to follow-up in 1 year and as needed.      Review of Systems  CONSTITUTIONAL: NEGATIVE for fever, chills, change in weight  INTEGUMENTARU/SKIN: NEGATIVE for worrisome rashes, moles or lesions  EYES: NEGATIVE for vision changes or irritation  ENT: NEGATIVE for ear, mouth and throat problems  RESP: NEGATIVE for significant cough or SOB  BREAST: NEGATIVE for masses, tenderness or discharge  CV: NEGATIVE for chest pain, palpitations or peripheral edema  GI: NEGATIVE for nausea, abdominal pain, heartburn, or change in bowel habits  : NEGATIVE for unusual urinary or vaginal symptoms. Periods are regular.  MUSCULOSKELETAL: NEGATIVE for significant arthralgias or myalgia  NEURO: NEGATIVE for weakness, dizziness or paresthesias  PSYCHIATRIC: NEGATIVE for changes in mood or affect     Physical Exam    COUNSELING:  Reviewed preventive health counseling, as reflected in patient instructions  Special attention given to:        Regular exercise       Healthy diet/nutrition       Colon cancer screening    BP Readings from Last 1 Encounters:   07/17/17 119/81     Estimated body mass index is 33.02 kg/(m^2) as calculated  "from the following:    Height as of 7/17/17: 5' 6\" (1.676 m).    Weight as of 7/17/17: 204 lb 9.6 oz (92.8 kg).           reports that she has never smoked. She has never used smokeless tobacco.      Counseling Resources:  ATP IV Guidelines  Pooled Cohorts Equation Calculator  Breast Cancer Risk Calculator  FRAX Risk Assessment  ICSI Preventive Guidelines  Dietary Guidelines for Americans, 2010  USDA's MyPlate  ASA Prophylaxis  Lung CA Screening    Bianca Simon MD  [facility]  Answers for HPI/ROS submitted by the patient on 8/19/2018   PHQ-2 Score: 0    "

## 2018-08-20 NOTE — PATIENT INSTRUCTIONS
Preventive Health Recommendations  Female Ages 50 - 64    Yearly exam: See your health care provider every year in order to  o Review health changes.   o Discuss preventive care.    o Review your medicines if your doctor has prescribed any.      Get a Pap test every three years (unless you have an abnormal result and your provider advises testing more often).    If you get Pap tests with HPV test, you only need to test every 5 years, unless you have an abnormal result.     You do not need a Pap test if your uterus was removed (hysterectomy) and you have not had cancer.    You should be tested each year for STDs (sexually transmitted diseases) if you're at risk.     Have a mammogram every 1 to 2 years.    Have a colonoscopy at age 50, or have a yearly FIT test (stool test). These exams screen for colon cancer.      Have a cholesterol test every 5 years, or more often if advised.    Have a diabetes test (fasting glucose) every three years. If you are at risk for diabetes, you should have this test more often.     If you are at risk for osteoporosis (brittle bone disease), think about having a bone density scan (DEXA).    Shots: Get a flu shot each year. Get a tetanus shot every 10 years.    Nutrition:     Eat at least 5 servings of fruits and vegetables each day.    Eat whole-grain bread, whole-wheat pasta and brown rice instead of white grains and rice.    Get adequate Calcium and Vitamin D.     Lifestyle    Exercise at least 150 minutes a week (30 minutes a day, 5 days a week). This will help you control your weight and prevent disease.    Limit alcohol to one drink per day.    No smoking.     Wear sunscreen to prevent skin cancer.     See your dentist every six months for an exam and cleaning.    See your eye doctor every 1 to 2 years.      You may schedule your mammogram at [facility] by calling [phone] and asking to schedule your mammogram or you may schedule with [facility]  [place] in [place] by calling [phone].

## 2018-08-21 LAB
CHOLEST SERPL-MCNC: 217 MG/DL
HDLC SERPL-MCNC: 60 MG/DL
LDLC SERPL CALC-MCNC: 130 MG/DL
NONHDLC SERPL-MCNC: 157 MG/DL
TRIGL SERPL-MCNC: 135 MG/DL
TSH SERPL DL<=0.005 MIU/L-ACNC: 0.91 MU/L (ref 0.4–4)

## 2018-08-21 ASSESSMENT — ASTHMA QUESTIONNAIRES: ACT_TOTALSCORE: 25

## 2018-09-13 ENCOUNTER — HOSPITAL ENCOUNTER (OUTPATIENT)
Dept: MAMMOGRAPHY | Facility: CLINIC | Age: 48
Discharge: HOME | End: 2018-09-13
Attending: FAMILY MEDICINE | Admitting: FAMILY MEDICINE
Payer: COMMERCIAL

## 2018-09-13 DIAGNOSIS — Z00.00 ROUTINE GENERAL MEDICAL EXAMINATION AT A HEALTH CARE FACILITY: ICD-10-CM

## 2018-09-13 PROCEDURE — 77067 SCR MAMMO BI INCL CAD: CPT

## 2018-09-14 ENCOUNTER — MYC MEDICAL ADVICE (OUTPATIENT)
Dept: FAMILY MEDICINE | Facility: CLINIC | Age: 48
End: 2018-09-14

## 2018-09-14 DIAGNOSIS — R63.5 WEIGHT GAIN: ICD-10-CM

## 2018-09-14 DIAGNOSIS — G43.829 MENSTRUAL MIGRAINE WITHOUT STATUS MIGRAINOSUS, NOT INTRACTABLE: ICD-10-CM

## 2018-09-14 RX ORDER — TOPIRAMATE 50 MG/1
50 TABLET, FILM COATED ORAL 2 TIMES DAILY
Qty: 60 TABLET | Refills: 1 | Status: SHIPPED | OUTPATIENT
Start: 2018-09-14 | End: 2018-10-31

## 2018-09-21 ENCOUNTER — MYC MEDICAL ADVICE (OUTPATIENT)
Dept: FAMILY MEDICINE | Facility: CLINIC | Age: 48
End: 2018-09-21

## 2018-09-21 ENCOUNTER — OFFICE VISIT (OUTPATIENT)
Dept: URGENT CARE | Facility: URGENT CARE | Age: 48
End: 2018-09-21
Payer: COMMERCIAL

## 2018-09-21 VITALS
OXYGEN SATURATION: 100 % | SYSTOLIC BLOOD PRESSURE: 124 MMHG | HEART RATE: 72 BPM | DIASTOLIC BLOOD PRESSURE: 76 MMHG | BODY MASS INDEX: 33.99 KG/M2 | TEMPERATURE: 98 F | HEIGHT: 65 IN | WEIGHT: 204 LBS

## 2018-09-21 DIAGNOSIS — N92.4 EXCESSIVE BLEEDING IN PREMENOPAUSAL PERIOD: Primary | ICD-10-CM

## 2018-09-21 LAB
ERYTHROCYTE [DISTWIDTH] IN BLOOD BY AUTOMATED COUNT: 12.6 % (ref 10–15)
HCT VFR BLD AUTO: 35.6 % (ref 35–47)
HGB BLD-MCNC: 12 G/DL (ref 11.7–15.7)
MCH RBC QN AUTO: 29.1 PG (ref 26.5–33)
MCHC RBC AUTO-ENTMCNC: 33.7 G/DL (ref 31.5–36.5)
MCV RBC AUTO: 86 FL (ref 78–100)
PLATELET # BLD AUTO: 369 10E9/L (ref 150–450)
RBC # BLD AUTO: 4.13 10E12/L (ref 3.8–5.2)
WBC # BLD AUTO: 9.2 10E9/L (ref 4–11)

## 2018-09-21 PROCEDURE — 36415 COLL VENOUS BLD VENIPUNCTURE: CPT | Performed by: FAMILY MEDICINE

## 2018-09-21 PROCEDURE — 99213 OFFICE O/P EST LOW 20 MIN: CPT | Performed by: FAMILY MEDICINE

## 2018-09-21 PROCEDURE — 85027 COMPLETE CBC AUTOMATED: CPT | Performed by: FAMILY MEDICINE

## 2018-09-21 PROCEDURE — 83001 ASSAY OF GONADOTROPIN (FSH): CPT | Performed by: FAMILY MEDICINE

## 2018-09-21 NOTE — PROGRESS NOTES
"SUBJECTIVE:  Chief Complaint   Patient presents with     Urgent Care     Vaginal Bleeding     Heavy beleeding, has had in the past      Eugenia Santoyo is a 48 year old female who presents with vaginal bleeding.    Onset of symptoms 4 day(s) ago, unchanged since initiation      Pain:- manageable.     Vaginal bleeding: Yes  Heavy-  She needs to change her pad hourly-   Little amount of clots-  Some lightheadedness    Vaginal symptoms: none      Hx of previous symptom: - yes had heavy menses about 3 years-  Noted to have \"odd\" shape uterus as possible cause,  Was considering treatment, but the heavy periods stopped and no further evaluation.  She then had no menses for 1 year,  Then an episode of spotting.  Then another year with no menses.  Then normal menses 1 month ago.  She was started on topiramate 1 month ago with reported side effect of menstrual bleeding  Has had FSH tested 2 x-  Last 2016-  Both times levels were low    Last episode of heavy bleeding she took Rx of double dose birth control pills-  She found the nausea fairly intolerable    Past Medical History:   Diagnosis Date     Benign heart murmur     since childhood     Intermittent asthma      Migraines      Patient Active Problem List   Diagnosis     Fatigue     Heart Murmur, benign     Intermittent asthma     CARDIOVASCULAR SCREENING; LDL GOAL LESS THAN 160     Menstrual migraine without status migrainosus, not intractable     Benign essential hypertension       ALLERGIES:  Imitrex [sumatriptan succinate]      Current Outpatient Prescriptions on File Prior to Visit:  irbesartan (AVAPRO) 150 MG tablet TAKE 1 TABLET(150 MG) BY MOUTH DAILY   topiramate (TOPAMAX) 25 MG tablet Take 1 tablet (25 mg) at bedtime for 1 week, then 1 tablet twice daily for 1 week, then 1 tablet in AM and 2 in PM for 1 week, then 2 tablets twice daily. (Patient not taking: Reported on 9/21/2018)   topiramate (TOPAMAX) 50 MG tablet Take 1 tablet (50 mg) by mouth 2 times daily " "    No current facility-administered medications on file prior to visit.     Social History   Substance Use Topics     Smoking status: Never Smoker     Smokeless tobacco: Never Used     Alcohol use Yes      Comment: occas       Family History   Problem Relation Age of Onset     Cancer Mother      CLL--Chronic lymphatic luekemia     C.A.D. Father      MI at  ~40yrs old     Cancer Maternal Grandmother      ? type of cancer,  50's         ROS:   CONSTITUTIONAL:NEGATIVE for fever, chills,    INTEGUMENTARY/SKIN: NEGATIVE for worrisome rashes   EYES: NEGATIVE for vision changes or irritation  ENT/MOUTH: NEGATIVE for ear, mouth and throat problems  RESP:NEGATIVE for significant cough or SOB    OBJECTIVE:  /76  Pulse 72  Temp 98  F (36.7  C) (Oral)  Ht 5' 5\" (1.651 m)  Wt 204 lb (92.5 kg)  SpO2 100%  BMI 33.95 kg/m2  : deferred  GENERAL APPEARANCE: healthy, alert and no distress  CV: regular rates and rhythm, normal S1 S2, no murmur noted  ABDOMEN:  Soft, , no HSM or masses and bowel sounds normal,  Mild suprapubic tenderness  BACK: No CVA tenderness  SKIN: no suspicious lesions or rashes    Results for orders placed or performed in visit on 18   CBC with platelets   Result Value Ref Range    WBC 9.2 4.0 - 11.0 10e9/L    RBC Count 4.13 3.8 - 5.2 10e12/L    Hemoglobin 12.0 11.7 - 15.7 g/dL    Hematocrit 35.6 35.0 - 47.0 %    MCV 86 78 - 100 fl    MCH 29.1 26.5 - 33.0 pg    MCHC 33.7 31.5 - 36.5 g/dL    RDW 12.6 10.0 - 15.0 %    Platelet Count 369 150 - 450 10e9/L         ASSESSMENT:  Excessive bleeding in premenopausal period     - CBC with platelets  - Follicle stimulating hormone    Discussed that though she needs frequent pad changes, the serum blood levels are in a normal range- and normal WBC's and platelets, so normal function of bone marrow  Though inconvenient,  The bleeding usually stops without treatment-  She did not want to repeat taking birth control  Based on her age she is likely " having perimenopausal irregular bleeding-    Discuss with primary care if she wants to continue the Topirimate  She did not need any kind of work note

## 2018-09-21 NOTE — TELEPHONE ENCOUNTER
topiramate can affect the absorption of the birth control pill and in that way can affect a woman's cycles but since she is not on the OCP, this should not have anything to do with it.   If her bleeding is really heavy she should go to UC.   If she can wait we can see her in clinic next week

## 2018-09-21 NOTE — MR AVS SNAPSHOT
"              After Visit Summary   9/21/2018    Eugenia Santoyo    MRN: [id number]           Patient Information     Date Of Birth          1970        Visit Information        Provider Department      9/21/2018 2:40 PM Nelly Beckwith MD [facility]        Today's Diagnoses     Excessive bleeding in premenopausal period    -  1       Follow-ups after your visit        Who to contact     If you have questions or need follow up information about today's clinic visit or your schedule please contact [facility] directly at [phone].  Normal or non-critical lab and imaging results will be communicated to you by [employer], letter or phone within 4 business days after the clinic has received the results. If you do not hear from us within 7 days, please contact the clinic through [employer] or phone. If you have a critical or abnormal lab result, we will notify you by phone as soon as possible.  Submit refill requests through [employer] or call your pharmacy and they will forward the refill request to us. Please allow 3 business days for your refill to be completed.          Additional Information About Your Visit        MyChart Information     [facility] gives you secure access to your electronic health record. If you see a primary care provider, you can also send messages to your care team and make appointments. If you have questions, please call your primary care clinic.  If you do not have a primary care provider, please call [phone] and they will assist you.        Care EveryWhere ID     This is your Care EveryWhere ID. This could be used by other organizations to access your [facility] medical records  [id number]        Your Vitals Were     Pulse Temperature Height Pulse Oximetry BMI (Body Mass Index)       72 98  F (36.7  C) (Oral) 5' 5\" (1.651 m) 100% 33.95 kg/m2        Blood Pressure from Last 3 Encounters:   09/21/18 124/76   08/20/18 122/78   07/17/17 119/81    Weight " from Last 3 Encounters:   09/21/18 204 lb (92.5 kg)   08/20/18 204 lb 12.8 oz (92.9 kg)   07/17/17 204 lb 9.6 oz (92.8 kg)              We Performed the Following     CBC with platelets     Follicle stimulating hormone        Primary Care Provider Office Phone # Fax #    Bianca LIZ Simon [phone] [address] [facility] [address]        Equal Access to Services     RICARDO GAYTAN : Hadii aad ku hadasho Soomaali, waaxda luqadaha, qaybta kaalmada adeegyada, waxay idiin hayaan adeeg bryannaarasilvino lacarolina . So [facility] [phone].    ATENCIÓN: Si habla español, tiene a loomis disposición servicios gratuitos de asistencia lingüística. [facility] [phone].    We comply with applicable federal civil rights laws and [place] laws. We do not discriminate on the basis of race, color, national origin, age, disability, sex, sexual orientation, or gender identity.            Thank you!     Thank you for choosing [facility]  for your care. Our goal is always to provide you with excellent care. Hearing back from our patients is one way we can continue to improve our services. Please take a few minutes to complete the written survey that you may receive in the mail after your visit with us. Thank you!             Your Updated Medication List - Protect others around you: Learn how to safely use, store and throw away your medicines at www.disposemymeds.org.          This list is accurate as of 9/21/18  4:51 PM.  Always use your most recent med list.                   Brand Name Dispense Instructions for use Diagnosis    irbesartan 150 MG tablet    AVAPRO    90 tablet    TAKE 1 TABLET(150 MG) BY MOUTH DAILY    Benign essential hypertension       * topiramate 25 MG tablet    TOPAMAX    70 tablet    Take 1 tablet (25 mg) at bedtime for 1 week, then 1 tablet twice daily for 1 week, then 1 tablet in AM and 2 in PM for 1 week, then 2 tablets twice daily.    Weight gain, Menstrual migraine without status  migrainosus, not intractable       * topiramate 50 MG tablet    TOPAMAX    60 tablet    Take 1 tablet (50 mg) by mouth 2 times daily    Weight gain, Menstrual migraine without status migrainosus, not intractable       * Notice:  This list has 2 medication(s) that are the same as other medications prescribed for you. Read the directions carefully, and ask your doctor or other care provider to review them with you.

## 2018-09-22 LAB — FSH SERPL-ACNC: 20.4 IU/L

## 2018-10-31 ENCOUNTER — OFFICE VISIT (OUTPATIENT)
Dept: FAMILY MEDICINE | Facility: CLINIC | Age: 48
End: 2018-10-31
Payer: COMMERCIAL

## 2018-10-31 VITALS
HEIGHT: 65 IN | HEART RATE: 64 BPM | SYSTOLIC BLOOD PRESSURE: 118 MMHG | WEIGHT: 199.6 LBS | TEMPERATURE: 97.9 F | OXYGEN SATURATION: 97 % | DIASTOLIC BLOOD PRESSURE: 76 MMHG | BODY MASS INDEX: 33.26 KG/M2 | RESPIRATION RATE: 14 BRPM

## 2018-10-31 DIAGNOSIS — R63.5 WEIGHT GAIN: ICD-10-CM

## 2018-10-31 DIAGNOSIS — Z23 NEED FOR PROPHYLACTIC VACCINATION AND INOCULATION AGAINST INFLUENZA: Primary | ICD-10-CM

## 2018-10-31 DIAGNOSIS — G43.829 MENSTRUAL MIGRAINE WITHOUT STATUS MIGRAINOSUS, NOT INTRACTABLE: ICD-10-CM

## 2018-10-31 PROCEDURE — 99213 OFFICE O/P EST LOW 20 MIN: CPT | Performed by: FAMILY MEDICINE

## 2018-10-31 PROCEDURE — 90471 IMMUNIZATION ADMIN: CPT | Performed by: FAMILY MEDICINE

## 2018-10-31 RX ORDER — TOPIRAMATE 50 MG/1
50 TABLET, FILM COATED ORAL 2 TIMES DAILY
Qty: 180 TABLET | Refills: 1 | Status: SHIPPED | OUTPATIENT
Start: 2018-10-31 | End: 2019-04-29

## 2018-10-31 NOTE — PROGRESS NOTES
Injectable Influenza Immunization Documentation    1.  Is the person to be vaccinated sick today?   No    2. Does the person to be vaccinated have an allergy to a component   of the vaccine?   No  Egg Allergy Algorithm Link    3. Has the person to be vaccinated ever had a serious reaction   to influenza vaccine in the past?   No    4. Has the person to be vaccinated ever had Guillain-Barré syndrome?   No    Form completed by Mary Carmen Mcarthur CMA on 10/31/2018 at 12:16 PM

## 2018-10-31 NOTE — PROGRESS NOTES
"  SUBJECTIVE:   Eugenia Santoyo is a 48 year old female who presents to clinic today for the following health issues:    She has been on topamax for about 2 months and she has had some side effects of tingling so far.   She has lost about 6 lbs since started.  She was placed on it for weight loss.       She got her period in September and she had very long period and she went to  for this.   It lasted over a week and was heavy.   Then her last one was more normal.       Medication Followup of Topamax    Taking Medication as prescribed: yes    Side Effects:  Tinggling in hands and feet , little foggy feeling at times    Medication Helping Symptoms:  yes     Past Medical History:   Diagnosis Date     Benign heart murmur     since childhood     Intermittent asthma      Migraines        Past Surgical History:   Procedure Laterality Date     D & C      after miscarriage       MEDICATIONS:  Current Outpatient Prescriptions   Medication     irbesartan (AVAPRO) 150 MG tablet     topiramate (TOPAMAX) 50 MG tablet     topiramate (TOPAMAX) 25 MG tablet     No current facility-administered medications for this visit.        SOCIAL HISTORY:  Social History   Substance Use Topics     Smoking status: Never Smoker     Smokeless tobacco: Never Used     Alcohol use Yes      Comment: occas       Family History   Problem Relation Age of Onset     Cancer Mother      CLL--Chronic lymphatic luekemia     C.A.D. Father      MI at  ~40yrs old     Cancer Maternal Grandmother      ? type of cancer,  50's       Objective:  Blood pressure 118/76, pulse 64, temperature 97.9  F (36.6  C), temperature source Oral, resp. rate 14, height 5' 5\" (1.651 m), weight 199 lb 9.6 oz (90.5 kg), last menstrual period 10/15/2018, SpO2 97 %, not currently breastfeeding.  Neck:  There is no lymphadenopathy or thyroid tenderness or enlargement  Chest: Clear to auscultation bilaterally.  No wheezes, rales or retractions.  CV: Regular rate and rhythm " without murmurs, rubs or gallops.  Extremities: There is no clubbing, cyanosis or edema.  Peripheral pulses are present bilaterally in the radial and dorsalis pedis arteries.      Assessment:  1. Weight gain  2. HCM - needs colonoscopy      Plan:  1. Flu shot  2. Continue meds at same doses  3. Refills per [facility]  4. Recheck in 6 months

## 2018-10-31 NOTE — MR AVS SNAPSHOT
"              After Visit Summary   10/31/2018    Eugenia Santoyo    MRN: [id number]           Patient Information     Date Of Birth          1970        Visit Information        Provider Department      10/31/2018 11:15 AM Bianca Simon MD [facility]        Today's Diagnoses     Weight gain        Menstrual migraine without status migrainosus, not intractable           Follow-ups after your visit        Follow-up notes from your care team     Return in about 6 months (around 4/30/2019).      Who to contact     If you have questions or need follow up information about today's clinic visit or your schedule please contact [facility] directly at [phone].  Normal or non-critical lab and imaging results will be communicated to you by MyChart, letter or phone within 4 business days after the clinic has received the results. If you do not hear from us within 7 days, please contact the clinic through [employer] or phone. If you have a critical or abnormal lab result, we will notify you by phone as soon as possible.  Submit refill requests through [employer] or call your pharmacy and they will forward the refill request to us. Please allow 3 business days for your refill to be completed.          Additional Information About Your Visit        MyChart Information     [employer] gives you secure access to your electronic health record. If you see a primary care provider, you can also send messages to your care team and make appointments. If you have questions, please call your primary care clinic.  If you do not have a primary care provider, please call [phone] and they will assist you.        Care EveryWhere ID     This is your Care EveryWhere ID. This could be used by other organizations to access your [place] medical records  [id number]        Your Vitals Were     Pulse Temperature Respirations Height Last Period Pulse Oximetry    64 97.9  F (36.6  C) (Oral) 14 5' 5\" (1.651 m) " 10/15/2018 (Exact Date) 97%    Breastfeeding? BMI (Body Mass Index)                No 33.22 kg/m2           Blood Pressure from Last 3 Encounters:   10/31/18 118/76   09/21/18 124/76   08/20/18 122/78    Weight from Last 3 Encounters:   10/31/18 199 lb 9.6 oz (90.5 kg)   09/21/18 204 lb (92.5 kg)   08/20/18 204 lb 12.8 oz (92.9 kg)              Today, you had the following     No orders found for display         Where to get your medicines      Some of these will need a paper prescription and others can be bought over the counter.  Ask your nurse if you have questions.     Bring a paper prescription for each of these medications     topiramate 50 MG tablet          Primary Care Provider Office Phone # Fax #    Bianca Simon [phone] [address] [facility] [address]        Equal Access to Services     [facility]OR[facility] : Hadii nona flowero Solaron, waaxda luqadaha, qaybta kaalmada ademonaeyablayne, shantell guajardo . So [facility] [phone].    ATENCIÓN: Si habla español, tiene a loomis disposición servicios gratuitos de asistencia lingüística. Llame al [phone].    We comply with applicable federal civil rights laws and [place] laws. We do not discriminate on the basis of race, color, national origin, age, disability, sex, sexual orientation, or gender identity.            Thank you!     Thank you for choosing [facility]  for your care. Our goal is always to provide you with excellent care. Hearing back from our patients is one way we can continue to improve our services. Please take a few minutes to complete the written survey that you may receive in the mail after your visit with us. Thank you!             Your Updated Medication List - Protect others around you: Learn how to safely use, store and throw away your medicines at www.disposemymeds.org.          This list is accurate as of 10/31/18 12:08 PM.  Always use your most recent med list.                    Brand Name Dispense Instructions for use Diagnosis    irbesartan 150 MG tablet    AVAPRO    90 tablet    TAKE 1 TABLET(150 MG) BY MOUTH DAILY    Benign essential hypertension       topiramate 50 MG tablet    TOPAMAX    180 tablet    Take 1 tablet (50 mg) by mouth 2 times daily    Weight gain, Menstrual migraine without status migrainosus, not intractable

## 2018-10-31 NOTE — NURSING NOTE
Prior to injection verified patient identity using patient's name and date of birth.  Due to injection administration, patient instructed to remain in clinic for 15 minutes  afterwards, and to report any adverse reaction to me immediately.    Mary Carmen Mcarthur, CMA on 10/31/2018 at 12:17 PM

## 2018-11-11 DIAGNOSIS — G43.829 MENSTRUAL MIGRAINE WITHOUT STATUS MIGRAINOSUS, NOT INTRACTABLE: ICD-10-CM

## 2018-11-11 DIAGNOSIS — R63.5 WEIGHT GAIN: ICD-10-CM

## 2018-11-11 NOTE — TELEPHONE ENCOUNTER
"Requested Prescriptions   Pending Prescriptions Disp Refills     topiramate (TOPAMAX) 50 MG tablet [Pharmacy Med Name: TOPIRAMATE 50MG TABLETS]  Last Written Prescription Date:  10/31/2018  Last Fill Quantity: 180 tablet,  # refills: 1   Last office visit: 10/31/2018 with prescribing provider:  Alfred   Future Office Visit:     60 tablet 0     Sig: TAKE 1 TABLET BY MOUTH TWICE DAILY    Anti-Seizure Meds Protocol  Failed    11/11/2018  3:26 AM       Failed - Review Authorizing provider's last note.     Refer to last progress notes: confirm request is for original authorizing provider (cannot be through other providers).         Passed - Recent (12 mo) or future (30 days) visit within the authorizing provider's specialty    Patient had office visit in the last 12 months or has a visit in the next 30 days with authorizing provider or within the authorizing provider's specialty.  See \"Patient Info\" tab in inbasket, or \"Choose Columns\" in Meds & Orders section of the refill encounter.             Passed - Normal CBC on file in past 26 months    Recent Labs   Lab Test  09/21/18   1505   WBC  9.2   RBC  4.13   HGB  12.0   HCT  35.6   PLT  369                Passed - Normal ALT or AST on file in past 26 months    Recent Labs   Lab Test  06/22/17   1625   ALT  21     Recent Labs   Lab Test  06/22/17   1625   AST  16            Passed - Normal platelet count on file in past 26 months    Recent Labs   Lab Test  09/21/18   1505   PLT  369              Passed - No active pregnancy on record       Passed - No positive pregnancy test in last 12 months          "

## 2018-11-13 RX ORDER — TOPIRAMATE 50 MG/1
TABLET, FILM COATED ORAL
Qty: 60 TABLET | Refills: 0 | OUTPATIENT
Start: 2018-11-13

## 2018-11-13 NOTE — TELEPHONE ENCOUNTER
Duplicate- sent - info sent to pharmacy.  Guillermina Dempsey,RN  [facility]  [phone]

## 2019-04-24 ENCOUNTER — MYC MEDICAL ADVICE (OUTPATIENT)
Dept: FAMILY MEDICINE | Facility: CLINIC | Age: 49
End: 2019-04-24

## 2019-04-29 ENCOUNTER — OFFICE VISIT (OUTPATIENT)
Dept: FAMILY MEDICINE | Facility: CLINIC | Age: 49
End: 2019-04-29
Payer: COMMERCIAL

## 2019-04-29 VITALS
DIASTOLIC BLOOD PRESSURE: 68 MMHG | OXYGEN SATURATION: 100 % | HEART RATE: 76 BPM | TEMPERATURE: 97.8 F | HEIGHT: 66 IN | SYSTOLIC BLOOD PRESSURE: 112 MMHG | WEIGHT: 190 LBS | BODY MASS INDEX: 30.53 KG/M2

## 2019-04-29 DIAGNOSIS — R63.5 WEIGHT GAIN: ICD-10-CM

## 2019-04-29 DIAGNOSIS — G43.829 MENSTRUAL MIGRAINE WITHOUT STATUS MIGRAINOSUS, NOT INTRACTABLE: ICD-10-CM

## 2019-04-29 PROCEDURE — 99213 OFFICE O/P EST LOW 20 MIN: CPT | Performed by: FAMILY MEDICINE

## 2019-04-29 RX ORDER — TOPIRAMATE 50 MG/1
TABLET, FILM COATED ORAL
Qty: 270 TABLET | Refills: 1 | Status: SHIPPED | OUTPATIENT
Start: 2019-04-29 | End: 2019-10-19

## 2019-04-29 ASSESSMENT — MIFFLIN-ST. JEOR: SCORE: 1493.58

## 2019-04-29 NOTE — PROGRESS NOTES
"  SUBJECTIVE:   Eugenia Santoyo is a 49 year old female who presents to clinic today for the following   health issues:    She is doing well but the weight came off about 15 lbs and now it seems like it has plateaud.   She is still having some tingling but other than that no side effects.  She is exercising 5-6 days per week.   She has .   She is doing cardio as well.   Her eating was not good over the winter.            Medication Followup of  Topamax    Taking Medication as prescribed: yes    Side Effects:  Intermittent - Tingling with hands and feet    Medication Helping Symptoms:  yes     Answers for HPI/ROS submitted by the patient on 2019   Chronic problems general questions HPI Form  Outpatient blood pressures: are not being checked  Dietary sodium intake:: Low salt diet    Past Medical History:   Diagnosis Date     Benign heart murmur     since childhood     Intermittent asthma      Migraines        Past Surgical History:   Procedure Laterality Date     D & C      after miscarriage       MEDICATIONS:  Current Outpatient Medications   Medication     irbesartan (AVAPRO) 150 MG tablet     topiramate (TOPAMAX) 50 MG tablet     No current facility-administered medications for this visit.        SOCIAL HISTORY:  Social History     Tobacco Use     Smoking status: Never Smoker     Smokeless tobacco: Never Used   Substance Use Topics     Alcohol use: Yes     Comment: occas       Family History   Problem Relation Age of Onset     Cancer Mother         CLL--Chronic lymphatic luekemia     C.A.D. Father         MI at  ~40yrs old     Cancer Maternal Grandmother         ? type of cancer,  50's       Objective:  Blood pressure 112/68, pulse 76, temperature 97.8  F (36.6  C), temperature source Oral, height 1.676 m (5' 6\"), weight 86.2 kg (190 lb), last menstrual period 2019, SpO2 100 %, not currently breastfeeding. Body mass index is 30.67 kg/m .   Neck:  There is no lymphadenopathy or " thyroid tenderness or enlargement  Chest: Clear to auscultation bilaterally.  No wheezes, rales or retractions.  CV: Regular rate and rhythm without murmurs, rubs or gallops.  ABDOMEN:  Positive bowel sounds, soft, nondistended and nontender.  No masses are palpated and there is no organomegaly or inguinal lymphadenopathy.  Extremities: There is no clubbing, cyanosis or edema.  Peripheral pulses are present bilaterally in the radial and dorsalis pedis arteries.    Assessment:  1. Overweight - is losing but slowly  2. Migraines - doing very well with this    Plan:  1. Increase topamax to 50 in am and 100 in pm  2. The potential side effects of the prescription medication were discussed with the patient.  3. Refer to nutrition  4. Physical in 8/2019        Answers for HPI/ROS submitted by the patient on 4/29/2019   Chronic problems general questions HPI Form  Outpatient blood pressures: are not being checked  Dietary sodium intake:: Low salt diet

## 2019-05-12 DIAGNOSIS — R63.5 WEIGHT GAIN: ICD-10-CM

## 2019-05-12 DIAGNOSIS — G43.829 MENSTRUAL MIGRAINE WITHOUT STATUS MIGRAINOSUS, NOT INTRACTABLE: ICD-10-CM

## 2019-05-13 RX ORDER — TOPIRAMATE 50 MG/1
TABLET, FILM COATED ORAL
Qty: 180 TABLET | Refills: 0 | OUTPATIENT
Start: 2019-05-13

## 2019-05-13 NOTE — TELEPHONE ENCOUNTER
Topamax  Sent 4/29/19 with 6 month supply. Refill not appropriate at this time.     Nadiya Kent, RN, BSN

## 2019-08-22 DIAGNOSIS — I10 BENIGN ESSENTIAL HYPERTENSION: ICD-10-CM

## 2019-08-22 NOTE — TELEPHONE ENCOUNTER
"Requested Prescriptions   Pending Prescriptions Disp Refills     irbesartan (AVAPRO) 150 MG tablet [Pharmacy Med Name: IRBESARTAN 150MG TABLETS] 90 tablet 0     Sig: TAKE 1 TABLET(150 MG) BY MOUTH DAILY       Angiotensin-II Receptors Failed - 8/22/2019  1:21 PM        Failed - Normal serum creatinine on file in past 12 months     Recent Labs   Lab Test 06/22/17  1625   CR 0.73             Failed - Normal serum potassium on file in past 12 months     Recent Labs   Lab Test 06/22/17  1625   POTASSIUM 3.8                    Passed - Last blood pressure under 140/90 in past 12 months     BP Readings from Last 3 Encounters:   04/29/19 112/68   10/31/18 118/76   09/21/18 124/76                 Passed - Recent (12 mo) or future (30 days) visit within the authorizing provider's specialty     Patient had office visit in the last 12 months or has a visit in the next 30 days with authorizing provider or within the authorizing provider's specialty.  See \"Patient Info\" tab in inbasket, or \"Choose Columns\" in Meds & Orders section of the refill encounter.              Passed - Medication is active on med list        Passed - Patient is age 18 or older        Passed - No active pregnancy on record        Passed - No positive pregnancy test in past 12 months      .    "

## 2019-08-23 NOTE — TELEPHONE ENCOUNTER
Routing refill request to provider for review/approval because:  Labs not current:  LIZ SMITH sent requesting appt as pt is due for physical  Malina Suero RN, BSN            Last Written Prescription Date:  8/20/18  Last Fill Quantity: 90,  # refills: 3   Last office visit: 4/29/2019 with prescribing provider:  Alfred   Future Office Visit:

## 2019-08-26 RX ORDER — IRBESARTAN 150 MG/1
TABLET ORAL
Qty: 90 TABLET | Refills: 0 | Status: SHIPPED | OUTPATIENT
Start: 2019-08-26 | End: 2019-11-20

## 2019-09-30 ENCOUNTER — HEALTH MAINTENANCE LETTER (OUTPATIENT)
Age: 49
End: 2019-09-30

## 2019-10-19 DIAGNOSIS — G43.829 MENSTRUAL MIGRAINE WITHOUT STATUS MIGRAINOSUS, NOT INTRACTABLE: ICD-10-CM

## 2019-10-19 DIAGNOSIS — R63.5 WEIGHT GAIN: ICD-10-CM

## 2019-10-21 RX ORDER — TOPIRAMATE 50 MG/1
TABLET, FILM COATED ORAL
Qty: 270 TABLET | Refills: 0 | Status: SHIPPED | OUTPATIENT
Start: 2019-10-21 | End: 2019-12-04

## 2019-10-21 NOTE — TELEPHONE ENCOUNTER
Routing refill request to provider for review/approval because:  Labs not current:  ast/alt  Patient needs to be seen because:  Pt was due for follow up June 2019    Med pended for 90 day supply with reminder

## 2019-10-21 NOTE — TELEPHONE ENCOUNTER
"Requested Prescriptions   Pending Prescriptions Disp Refills     topiramate (TOPAMAX) 50 MG tablet [Pharmacy Med Name: TOPIRAMATE 50MG TABLETS]  Last Written Prescription Date:  4/29/2019  Last Fill Quantity: 270 tablet,  # refills: 1   Last office visit: 4/29/2019 with prescribing provider:  Jac   Future Office Visit:     270 tablet 0     Sig: TAKE 1 TABLET BY MOUTH IN THE MORNING AND 2 TABLETS AT NIGHT       Anti-Seizure Meds Protocol  Failed - 10/19/2019  2:48 PM        Failed - Review Authorizing provider's last note.      Refer to last progress notes: confirm request is for original authorizing provider (cannot be through other providers).          Failed - Normal ALT or AST on file in past 26 months     Recent Labs   Lab Test 06/22/17  1625   ALT 21     Recent Labs   Lab Test 06/22/17  1625   AST 16             Passed - Recent (12 mo) or future (30 days) visit within the authorizing provider's specialty     Patient has had an office visit with the authorizing provider or a provider within the authorizing providers department within the previous 12 mos or has a future within next 30 days. See \"Patient Info\" tab in inbasket, or \"Choose Columns\" in Meds & Orders section of the refill encounter.              Passed - Normal CBC on file in past 26 months     Recent Labs   Lab Test 09/21/18  1505   WBC 9.2   RBC 4.13   HGB 12.0   HCT 35.6                    Passed - Normal platelet count on file in past 26 months     Recent Labs   Lab Test 09/21/18  1505                  Passed - Medication is active on med list        Passed - No active pregnancy on record        Passed - No positive pregnancy test in last 12 months          "

## 2019-10-29 ENCOUNTER — HEALTH MAINTENANCE LETTER (OUTPATIENT)
Age: 49
End: 2019-10-29

## 2019-11-19 ENCOUNTER — HOSPITAL ENCOUNTER (OUTPATIENT)
Dept: MAMMOGRAPHY | Facility: CLINIC | Age: 49
Discharge: HOME | End: 2019-11-19
Attending: FAMILY MEDICINE | Admitting: FAMILY MEDICINE
Payer: COMMERCIAL

## 2019-11-19 DIAGNOSIS — Z12.31 OTHER SCREENING MAMMOGRAM: ICD-10-CM

## 2019-11-19 PROCEDURE — 77063 BREAST TOMOSYNTHESIS BI: CPT

## 2019-11-20 DIAGNOSIS — I10 BENIGN ESSENTIAL HYPERTENSION: ICD-10-CM

## 2019-11-20 RX ORDER — IRBESARTAN 150 MG/1
TABLET ORAL
Qty: 90 TABLET | Refills: 0 | Status: SHIPPED | OUTPATIENT
Start: 2019-11-20 | End: 2019-12-04

## 2019-11-20 NOTE — LETTER
My Asthma Action Plan    Name: Eugenia Santoyo   YOB: 1970  Date: 12/3/2019   My doctor: Bianca Nathan MD   My clinic: [facility]        My Rescue Medicine:   Albuterol inhaler (Proair/Ventolin/Proventil HFA)  2-4 puffs EVERY 4 HOURS as needed. Use a spacer if recommended by your provider.   My Asthma Severity:   .  Know your asthma triggers: .             GREEN ZONE   Good Control    I feel good    No cough or wheeze    Can work, sleep and play without asthma symptoms       Take your asthma control medicine every day.     1. If exercise triggers your asthma, take your rescue medication    15 minutes before exercise or sports, and    During exercise if you have asthma symptoms  2. Spacer to use with inhaler: If you have a spacer, make sure to use it with your inhaler             YELLOW ZONE Getting Worse  I have ANY of these:    I do not feel good    Cough or wheeze    Chest feels tight    Wake up at night   1. Keep taking your Green Zone medications  2. Start taking your rescue medicine:    every 20 minutes for up to 1 hour. Then every 4 hours for 24-48 hours.  3. If you stay in the Yellow Zone for more than 12-24 hours, contact your doctor.  4. If you do not return to the Green Zone in 12-24 hours or you get worse, start taking your oral steroid medicine if prescribed by your provider.           RED ZONE Medical Alert - Get Help  I have ANY of these:    I feel awful    Medicine is not helping    Breathing getting harder    Trouble walking or talking    Nose opens wide to breathe       1. Take your rescue medicine NOW  2. If your provider has prescribed an oral steroid medicine, start taking it NOW  3. Call your doctor NOW  4. If you are still in the Red Zone after 20 minutes and you have not reached your doctor:    Take your rescue medicine again and    Call 911 or go to the emergency room right away    See your regular doctor within 2 weeks of an Emergency Room or Urgent Care  visit for follow-up treatment.          Annual Reminders:  Meet with Asthma Educator,  Flu Shot in the Fall, consider Pneumonia Vaccination for patients with asthma (aged 19 and older).    Pharmacy: [id number] [place], [place] - [address]  AT [facility] [address]    Provider signature: Electronically Signed by Bianca Nathan MD   Date: 12/03/19                    Asthma Triggers  How To Control Things That Make Your Asthma Worse    Triggers are things that make your asthma worse.  Look at the list below to help you find your triggers and   what you can do about them. You can help prevent asthma flare-ups by staying away from your triggers.      Trigger                                                          What you can do   Cigarette Smoke  Tobacco smoke can make asthma worse. Do not allow smoking in your home, car or around you.  Be sure no one smokes at a child s day care or school.  If you smoke, ask your health care provider for ways to help you quit.  Ask family members to quit too.  Ask your health care provider for a referral to Quit Plan to help you quit smoking, or call 2-196-701-PLAN.     Colds, Flu, Bronchitis  These are common triggers of asthma. Wash your hands often.  Don t touch your eyes, nose or mouth.  Get a flu shot every year.     Dust Mites  These are tiny bugs that live in cloth or carpet. They are too small to see. Wash sheets and blankets in hot water every week.   Encase pillows and mattress in dust mite proof covers.  Avoid having carpet if you can. If you have carpet, vacuum weekly.   Use a dust mask and HEPA vacuum.   Pollen and Outdoor Mold  Some people are allergic to trees, grass, or weed pollen, or molds. Try to keep your windows closed.  Limit time out doors when pollen count is high.   Ask you health care provider about taking medicine during allergy season.     Animal Dander  Some people are allergic to skin flakes, urine or saliva from pets with  fur or feathers. Keep pets with fur or feathers out of your home.    If you can t keep the pet outdoors, then keep the pet out of your bedroom.  Keep the bedroom door closed.  Keep pets off cloth furniture and away from stuffed toys.     Mice, Rats, and Cockroaches  Some people are allergic to the waste from these pests.   Cover food and garbage.  Clean up spills and food crumbs.  Store grease in the refrigerator.   Keep food out of the bedroom.   Indoor Mold  This can be a trigger if your home has high moisture. Fix leaking faucets, pipes, or other sources of water.   Clean moldy surfaces.  Dehumidify basement if it is damp and smelly.   Smoke, Strong Odors, and Sprays  These can reduce air quality. Stay away from strong odors and sprays, such as perfume, powder, hair spray, paints, smoke incense, paint, cleaning products, candles and new carpet.   Exercise or Sports  Some people with asthma have this trigger. Be active!  Ask your doctor about taking medicine before sports or exercise to prevent symptoms.    Warm up for 5-10 minutes before and after sports or exercise.     Other Triggers of Asthma  Cold air:  Cover your nose and mouth with a scarf.  Sometimes laughing or crying can be a trigger.  Some medicines and food can trigger asthma.

## 2019-11-20 NOTE — TELEPHONE ENCOUNTER
"Requested Prescriptions   Pending Prescriptions Disp Refills     irbesartan (AVAPRO) 150 MG tablet [Pharmacy Med Name: IRBESARTAN 150MG TABLETS]    Last Written Prescription Date:  8/16/19  Last Fill Quantity: 90 tablet,  # refills: 0   Last office visit: 4/29/2019 with prescribing provider:  Jac     Future Office Visit:                           Angiotensin-II Receptors Failed - 11/20/2019  4:08 PM        Failed - Normal serum creatinine on file in past 12 months     Recent Labs   Lab Test 06/22/17  1625   CR 0.73             Failed - Normal serum potassium on file in past 12 months     Recent Labs   Lab Test 06/22/17  1625   POTASSIUM 3.8                    Passed - Last blood pressure under 140/90 in past 12 months     BP Readings from Last 3 Encounters:   04/29/19 112/68   10/31/18 118/76   09/21/18 124/76                 Passed - Recent (12 mo) or future (30 days) visit within the authorizing provider's specialty     Patient has had an office visit with the authorizing provider or a provider within the authorizing providers department within the previous 12 mos or has a future within next 30 days. See \"Patient Info\" tab in inbasket, or \"Choose Columns\" in Meds & Orders section of the refill encounter.              Passed - Medication is active on med list        Passed - Patient is age 18 or older        Passed - No active pregnancy on record        Passed - No positive pregnancy test in past 12 months       90 tablet 0     Sig: TAKE 1 TABLET(150 MG) BY MOUTH DAILY        "

## 2019-11-21 NOTE — TELEPHONE ENCOUNTER
Patient has pending appointment as below:    12/4/19 with PCP    Rx refilled per [facility] refill protocol.    Chinyere GONGN, RN

## 2019-12-04 ENCOUNTER — OFFICE VISIT (OUTPATIENT)
Dept: FAMILY MEDICINE | Facility: CLINIC | Age: 49
End: 2019-12-04
Payer: COMMERCIAL

## 2019-12-04 VITALS
WEIGHT: 185.6 LBS | OXYGEN SATURATION: 98 % | SYSTOLIC BLOOD PRESSURE: 129 MMHG | RESPIRATION RATE: 16 BRPM | TEMPERATURE: 98.4 F | BODY MASS INDEX: 29.96 KG/M2 | DIASTOLIC BLOOD PRESSURE: 82 MMHG | HEART RATE: 71 BPM

## 2019-12-04 DIAGNOSIS — I10 BENIGN ESSENTIAL HYPERTENSION: ICD-10-CM

## 2019-12-04 DIAGNOSIS — R63.5 WEIGHT GAIN: ICD-10-CM

## 2019-12-04 DIAGNOSIS — Z00.00 ROUTINE GENERAL MEDICAL EXAMINATION AT A HEALTH CARE FACILITY: ICD-10-CM

## 2019-12-04 DIAGNOSIS — G43.829 MENSTRUAL MIGRAINE WITHOUT STATUS MIGRAINOSUS, NOT INTRACTABLE: ICD-10-CM

## 2019-12-04 DIAGNOSIS — Z12.11 SCREEN FOR COLON CANCER: Primary | ICD-10-CM

## 2019-12-04 DIAGNOSIS — Z83.3 FAMILY HISTORY OF DIABETES MELLITUS: ICD-10-CM

## 2019-12-04 LAB — HBA1C MFR BLD: 5 % (ref 0–5.6)

## 2019-12-04 PROCEDURE — 80053 COMPREHEN METABOLIC PANEL: CPT | Performed by: FAMILY MEDICINE

## 2019-12-04 PROCEDURE — 87624 HPV HI-RISK TYP POOLED RSLT: CPT | Performed by: FAMILY MEDICINE

## 2019-12-04 PROCEDURE — G0145 SCR C/V CYTO,THINLAYER,RESCR: HCPCS | Performed by: FAMILY MEDICINE

## 2019-12-04 PROCEDURE — 80061 LIPID PANEL: CPT | Performed by: FAMILY MEDICINE

## 2019-12-04 PROCEDURE — 99396 PREV VISIT EST AGE 40-64: CPT | Performed by: FAMILY MEDICINE

## 2019-12-04 PROCEDURE — 84443 ASSAY THYROID STIM HORMONE: CPT | Performed by: FAMILY MEDICINE

## 2019-12-04 PROCEDURE — 83036 HEMOGLOBIN GLYCOSYLATED A1C: CPT | Performed by: FAMILY MEDICINE

## 2019-12-04 PROCEDURE — 87389 HIV-1 AG W/HIV-1&-2 AB AG IA: CPT | Performed by: FAMILY MEDICINE

## 2019-12-04 PROCEDURE — 36415 COLL VENOUS BLD VENIPUNCTURE: CPT | Performed by: FAMILY MEDICINE

## 2019-12-04 PROCEDURE — 82043 UR ALBUMIN QUANTITATIVE: CPT | Performed by: FAMILY MEDICINE

## 2019-12-04 RX ORDER — IRBESARTAN 150 MG/1
TABLET ORAL
Qty: 90 TABLET | Refills: 3 | Status: SHIPPED | OUTPATIENT
Start: 2019-12-04 | End: 2021-01-20

## 2019-12-04 RX ORDER — TOPIRAMATE 50 MG/1
TABLET, FILM COATED ORAL
Qty: 270 TABLET | Refills: 3 | Status: SHIPPED | OUTPATIENT
Start: 2019-12-04 | End: 2021-01-20 | Stop reason: SINTOL

## 2019-12-04 SDOH — ECONOMIC STABILITY: FOOD INSECURITY: WITHIN THE PAST 12 MONTHS, YOU WORRIED THAT YOUR FOOD WOULD RUN OUT BEFORE YOU GOT MONEY TO BUY MORE.: NEVER TRUE

## 2019-12-04 SDOH — SOCIAL STABILITY: SOCIAL NETWORK: HOW OFTEN DO YOU ATTEND CHURCH OR RELIGIOUS SERVICES?: PATIENT DECLINED

## 2019-12-04 SDOH — ECONOMIC STABILITY: TRANSPORTATION INSECURITY
IN THE PAST 12 MONTHS, HAS THE LACK OF TRANSPORTATION KEPT YOU FROM MEDICAL APPOINTMENTS OR FROM GETTING MEDICATIONS?: NO

## 2019-12-04 SDOH — SOCIAL STABILITY: SOCIAL NETWORK
DO YOU BELONG TO ANY CLUBS OR ORGANIZATIONS SUCH AS CHURCH GROUPS UNIONS, FRATERNAL OR ATHLETIC GROUPS, OR SCHOOL GROUPS?: PATIENT DECLINED

## 2019-12-04 SDOH — SOCIAL STABILITY: SOCIAL NETWORK: IN A TYPICAL WEEK, HOW MANY TIMES DO YOU TALK ON THE PHONE WITH FAMILY, FRIENDS, OR NEIGHBORS?: PATIENT DECLINED

## 2019-12-04 SDOH — HEALTH STABILITY: MENTAL HEALTH
STRESS IS WHEN SOMEONE FEELS TENSE, NERVOUS, ANXIOUS, OR CAN'T SLEEP AT NIGHT BECAUSE THEIR MIND IS TROUBLED. HOW STRESSED ARE YOU?: NOT AT ALL

## 2019-12-04 SDOH — HEALTH STABILITY: PHYSICAL HEALTH: ON AVERAGE, HOW MANY DAYS PER WEEK DO YOU ENGAGE IN MODERATE TO STRENUOUS EXERCISE (LIKE A BRISK WALK)?: 3 DAYS

## 2019-12-04 SDOH — ECONOMIC STABILITY: FOOD INSECURITY: WITHIN THE PAST 12 MONTHS, THE FOOD YOU BOUGHT JUST DIDN'T LAST AND YOU DIDN'T HAVE MONEY TO GET MORE.: NEVER TRUE

## 2019-12-04 SDOH — SOCIAL STABILITY: SOCIAL NETWORK: HOW OFTEN DO YOU ATTENT MEETINGS OF THE CLUB OR ORGANIZATION YOU BELONG TO?: PATIENT DECLINED

## 2019-12-04 SDOH — SOCIAL STABILITY: SOCIAL NETWORK: ARE YOU MARRIED, WIDOWED, DIVORCED, SEPARATED, NEVER MARRIED, OR LIVING WITH A PARTNER?: MARRIED

## 2019-12-04 SDOH — HEALTH STABILITY: MENTAL HEALTH: HOW MANY STANDARD DRINKS CONTAINING ALCOHOL DO YOU HAVE ON A TYPICAL DAY?: PATIENT DECLINED

## 2019-12-04 SDOH — SOCIAL STABILITY: SOCIAL NETWORK: HOW OFTEN DO YOU GET TOGETHER WITH FRIENDS OR RELATIVES?: PATIENT DECLINED

## 2019-12-04 SDOH — HEALTH STABILITY: PHYSICAL HEALTH: ON AVERAGE, HOW MANY MINUTES DO YOU ENGAGE IN EXERCISE AT THIS LEVEL?: 40 MIN

## 2019-12-04 SDOH — HEALTH STABILITY: MENTAL HEALTH: HOW OFTEN DO YOU HAVE A DRINK CONTAINING ALCOHOL?: MONTHLY OR LESS

## 2019-12-04 SDOH — HEALTH STABILITY: MENTAL HEALTH: HOW OFTEN DO YOU HAVE 6 OR MORE DRINKS ON ONE OCCASION?: NEVER

## 2019-12-04 SDOH — ECONOMIC STABILITY: TRANSPORTATION INSECURITY
IN THE PAST 12 MONTHS, HAS LACK OF TRANSPORTATION KEPT YOU FROM MEETINGS, WORK, OR FROM GETTING THINGS NEEDED FOR DAILY LIVING?: NO

## 2019-12-04 ASSESSMENT — ENCOUNTER SYMPTOMS
MYALGIAS: 0
HEADACHES: 0
SHORTNESS OF BREATH: 0
JOINT SWELLING: 0
CONSTIPATION: 0
SORE THROAT: 0
BREAST MASS: 0
DYSURIA: 0
DIARRHEA: 0
HEARTBURN: 0
PALPITATIONS: 0
CHILLS: 0
PARESTHESIAS: 0
WEAKNESS: 0
COUGH: 0
NERVOUS/ANXIOUS: 0
DIZZINESS: 0
ARTHRALGIAS: 0
FREQUENCY: 0
HEMATOCHEZIA: 0
FEVER: 0
EYE PAIN: 0
ABDOMINAL PAIN: 0
HEMATURIA: 0
NAUSEA: 0

## 2019-12-04 NOTE — PATIENT INSTRUCTIONS
Preventive Health Recommendations  Female Ages 50 - 64    Yearly exam: See your health care provider every year in order to  o Review health changes.   o Discuss preventive care.    o Review your medicines if your doctor has prescribed any.      Get a Pap test every three years (unless you have an abnormal result and your provider advises testing more often).    If you get Pap tests with HPV test, you only need to test every 5 years, unless you have an abnormal result.     You do not need a Pap test if your uterus was removed (hysterectomy) and you have not had cancer.    You should be tested each year for STDs (sexually transmitted diseases) if you're at risk.     Have a mammogram every 1 to 2 years.    Have a colonoscopy at age 50, or have a yearly FIT test (stool test). These exams screen for colon cancer.      Have a cholesterol test every 5 years, or more often if advised.    Have a diabetes test (fasting glucose) every three years. If you are at risk for diabetes, you should have this test more often.     If you are at risk for osteoporosis (brittle bone disease), think about having a bone density scan (DEXA).    Shots: Get a flu shot each year. Get a tetanus shot every 10 years.    Nutrition:     Eat at least 5 servings of fruits and vegetables each day.    Eat whole-grain bread, whole-wheat pasta and brown rice instead of white grains and rice.    Get adequate Calcium and Vitamin D.     Lifestyle    Exercise at least 150 minutes a week (30 minutes a day, 5 days a week). This will help you control your weight and prevent disease.    Limit alcohol to one drink per day.    No smoking.     Wear sunscreen to prevent skin cancer.     See your dentist every six months for an exam and cleaning.    See your eye doctor every 1 to 2 years.      Thank you for choosing [facility] today for your health care needs.     [facility] transforming primary care  At [facility], we re constantly working to improve how we serve our  patients and communities. We re currently making changes to the way we deliver care. Most of the work is behind the scenes, but you ll benefit from our efforts to make it easier and more convenient to stay connected to [facility] and your care team to maintain your optimal health.    Benefits of a primary care provider  If you don t have a designated primary care provider yet, we encourage you to get to know our care team online, and find a provider you d like to see. Most of our providers have a short video on their online provider page. Visit [email or web address] to explore our providers and locations.     Benefits of having a primary care provider include:      A provider who sees you regularly is more likely to notice changes in your health.    They get to know you - your health history, family history and goals, making it easier to make a health plan together.     You get to know them - making health-related conversations and decisions easier      Primary care doctors help you when you re sick or hurt - but also focus on keeping you healthy with preventive care and screenings.     Online access to your health records and care team  [employer] is our online tool that makes it easy to see your health care information and communicate with your care team. [employer] allows you to:          View your health maintenance plan so you know when you re due for a preventive screening    Send secure messages to your care team    View your health history and visit summaries     Schedule appointments     Complete questionnaires and eCheck-in before appointments      Get care from your provider with an e-visit      View and pay your bill     Sign up at [facility]. Once you have an account, you also can download the mobile daniel.     Convenient care options   Online or by phone:     E-visit:  When you need care, but don t need a face-to-face appointment, complete an e-visit in [employer]. Your provider will respond within one business  day.    Phone visit: A convenient and cost-effective option for follow-up visits or addressing common conditions. Schedule a phone visit by calling the clinic.     OnCare: Our online clinic is available 24/7 for treatment of dozens of common conditions. Complete an online interview, then a [facility] provider will respond in an hour or less. Start a visit at [email or web address].       In-person     Clinic appointments: Schedule an appointment at a clinic close to you anytime online at [email or web address] or call [address] [place] [address].     Urgent Care: Visit one of our Urgent Care locations throughout the [facility]. View locations and estimated wait times at [email or web address].

## 2019-12-04 NOTE — PROGRESS NOTES
pap   SUBJECTIVE:   CC: Eugenia Santoyo is an 49 year old woman who presents for preventive health visit.     She has no concerns today.   Her medication is working well with no  side effects.   Her headaches are under very good control with topamax.       Healthy Habits:     Getting at least 3 servings of Calcium per day:  Yes    Bi-annual eye exam:  Yes    Dental care twice a year:  Yes    Sleep apnea or symptoms of sleep apnea:  None    Diet:  Regular (no restrictions)    Frequency of exercise:  4-5 days/week    Duration of exercise:  45-60 minutes    Taking medications regularly:  Yes    Medication side effects:  Other    PHQ-2 Total Score: 0    Additional concerns today:  No          Today's PHQ-2 Score:   PHQ-2 ( 1999 [employer]) 12/4/2019   Q1: Little interest or pleasure in doing things 0   Q2: Feeling down, depressed or hopeless 0   PHQ-2 Score 0   Q1: Little interest or pleasure in doing things Not at all   Q2: Feeling down, depressed or hopeless Not at all   PHQ-2 Score 0       Abuse: Current or Past(Physical, Sexual or Emotional)- No  Do you feel safe in your environment? Yes        Social History     Tobacco Use     Smoking status: Never Smoker     Smokeless tobacco: Never Used   Substance Use Topics     Alcohol use: Yes     Frequency: Monthly or less     Drinks per session: Patient refused     Binge frequency: Never     Comment: occas         Alcohol Use 12/4/2019   Prescreen: >3 drinks/day or >7 drinks/week? No   Prescreen: >3 drinks/day or >7 drinks/week? -       Reviewed orders with patient.  Reviewed health maintenance and updated orders accordingly - Yes  Labs reviewed in [facility]    Mammogram Screening: Patient over age 50, mutual decision to screen reflected in health maintenance.    Pertinent mammograms are reviewed under the imaging tab.  History of abnormal Pap smear: NO - age 21-29 PAP every 3 years recommended  PAP / HPV Latest Ref Rng & Units 5/11/2016 9/21/2009 8/22/2007   PAP - NIL NIL NIL   HPV  16 DNA NEG Negative - -   HPV 18 DNA NEG Negative - -   OTHER HR HPV NEG Negative - -     Reviewed and updated as needed this visit by clinical staff  Tobacco  Allergies  Meds  Med Hx  Surg Hx  Fam Hx  Soc Hx        Reviewed and updated as needed this visit by Provider        Past Medical History:   Diagnosis Date     Benign heart murmur     since childhood     Intermittent asthma      Migraines        Past Surgical History:   Procedure Laterality Date     D & C      after miscarriage       MEDICATIONS:  Current Outpatient Medications   Medication     irbesartan (AVAPRO) 150 MG tablet     topiramate (TOPAMAX) 50 MG tablet     No current facility-administered medications for this visit.        SOCIAL HISTORY:  Social History     Tobacco Use     Smoking status: Never Smoker     Smokeless tobacco: Never Used   Substance Use Topics     Alcohol use: Yes     Frequency: Monthly or less     Drinks per session: Patient refused     Binge frequency: Never     Comment: occas       Family History   Problem Relation Age of Onset     Cancer Mother         CLL--Chronic lymphatic luekemia     C.A.D. Father         MI at  ~40yrs old     Cancer Maternal Grandmother         ? type of cancer,  50's         Review of Systems   Constitutional: Negative for chills and fever.   HENT: Negative for congestion, ear pain, hearing loss and sore throat.    Eyes: Negative for pain and visual disturbance.   Respiratory: Negative for cough and shortness of breath.    Cardiovascular: Negative for chest pain, palpitations and peripheral edema.   Gastrointestinal: Negative for abdominal pain, constipation, diarrhea, heartburn, hematochezia and nausea.   Breasts:  Negative for tenderness, breast mass and discharge.   Genitourinary: Positive for vaginal bleeding. Negative for dysuria, frequency, genital sores, hematuria, pelvic pain, urgency and vaginal discharge.   Musculoskeletal: Negative for arthralgias, joint swelling and myalgias.    Skin: Negative for rash.   Neurological: Negative for dizziness, weakness, headaches and paresthesias.   Psychiatric/Behavioral: Negative for mood changes. The patient is not nervous/anxious.           OBJECTIVE:   /82 (BP Location: Right arm, Patient Position: Sitting, Cuff Size: Adult Large)   Pulse 71   Temp 98.4  F (36.9  C) (Oral)   Resp 16   Wt 84.2 kg (185 lb 9.6 oz)   LMP 12/01/2019 (Exact Date)   SpO2 98%   Breastfeeding No   BMI 29.96 kg/m    Physical Exam  GENERAL APPEARANCE: healthy, alert and no distress  EYES: Eyes grossly normal to inspection, PERRL and conjunctivae and sclerae normal  HENT: ear canals and TM's normal, nose and mouth without ulcers or lesions, oropharynx clear and oral mucous membranes moist  NECK: no adenopathy, no asymmetry, masses, or scars and thyroid normal to palpation  RESP: lungs clear to auscultation - no rales, rhonchi or wheezes  BREAST: normal without masses, tenderness or nipple discharge and no palpable axillary masses or adenopathy  CV: regular rate and rhythm, normal S1 S2, no S3 or S4, no murmur, click or rub, no peripheral edema and peripheral pulses strong  ABDOMEN: soft, nontender, no hepatosplenomegaly, no masses and bowel sounds normal   (female): normal female external genitalia, normal urethral meatus, vaginal mucosal atrophy noted, normal cervix, adnexae, and uterus without masses or abnormal discharge  MS: no musculoskeletal defects are noted and gait is age appropriate without ataxia  SKIN: no suspicious lesions or rashes  NEURO: Normal strength and tone, sensory exam grossly normal, mentation intact and speech normal  PSYCH: mentation appears normal and affect normal/bright    Diagnostic Test Results:  Labs reviewed in Epic    ASSESSMENT/PLAN:   1. Screen for colon cancer    - GASTROENTEROLOGY ADULT REF PROCEDURE ONLY Norma Rodríguez [phone]; [facility]    2. Routine general medical examination at a health care facility    - HIV  "Screening  - Lipid panel reflex to direct LDL Fasting    3. Benign essential hypertension  under good control    - irbesartan (AVAPRO) 150 MG tablet; 1 tab orally per day  Dispense: 90 tablet; Refill: 3  - Comprehensive metabolic panel  - Albumin Random Urine Quantitative with Creat Ratio  - TSH with free T4 reflex    4. Weight gain  stable  - topiramate (TOPAMAX) 50 MG tablet; TAKE 1 TABLET BY MOUTH IN THE MORNING AND 2 TABLETS AT NIGHT  Dispense: 270 tablet; Refill: 3  - Comprehensive metabolic panel    5. Menstrual migraine without status migrainosus, not intractable  stable  - topiramate (TOPAMAX) 50 MG tablet; TAKE 1 TABLET BY MOUTH IN THE MORNING AND 2 TABLETS AT NIGHT  Dispense: 270 tablet; Refill: 3  - Comprehensive metabolic panel    6. Family history of diabetes mellitus    - TSH with free T4 reflex  - Hemoglobin A1c    COUNSELING:  Reviewed preventive health counseling, as reflected in patient instructions  Special attention given to:        Regular exercise       Healthy diet/nutrition    Estimated body mass index is 30.67 kg/m  as calculated from the following:    Height as of 4/29/19: 1.676 m (5' 6\").    Weight as of 4/29/19: 86.2 kg (190 lb).         reports that she has never smoked. She has never used smokeless tobacco.      Counseling Resources:  ATP IV Guidelines  Pooled Cohorts Equation Calculator  Breast Cancer Risk Calculator  FRAX Risk Assessment  ICSI Preventive Guidelines  Dietary Guidelines for Americans, 2010  [facility] management's MyPlate  ASA Prophylaxis  Lung CA Screening    Bianca Nathan MD  [facility]  "

## 2019-12-04 NOTE — LETTER
My Asthma Action Plan    Name: Eugenia Santoyo   YOB: 1970  Date: 12/4/2019   My doctor: Bianca Nathan MD   My clinic: [facility]        My Rescue Medicine:   Albuterol inhaler (Proair/Ventolin/Proventil HFA)  2-4 puffs EVERY 4 HOURS as needed. Use a spacer if recommended by your provider.   My Asthma Severity:   Intermittent / Exercise Induced  Know your asthma triggers: Patient is unaware of triggers             GREEN ZONE   Good Control    I feel good    No cough or wheeze    Can work, sleep and play without asthma symptoms       Take your asthma control medicine every day.     1. If exercise triggers your asthma, take your rescue medication    15 minutes before exercise or sports, and    During exercise if you have asthma symptoms  2. Spacer to use with inhaler: If you have a spacer, make sure to use it with your inhaler             YELLOW ZONE Getting Worse  I have ANY of these:    I do not feel good    Cough or wheeze    Chest feels tight    Wake up at night   1. Keep taking your Green Zone medications  2. Start taking your rescue medicine:    every 20 minutes for up to 1 hour. Then every 4 hours for 24-48 hours.  3. If you stay in the Yellow Zone for more than 12-24 hours, contact your doctor.  4. If you do not return to the Green Zone in 12-24 hours or you get worse, start taking your oral steroid medicine if prescribed by your provider.           RED ZONE Medical Alert - Get Help  I have ANY of these:    I feel awful    Medicine is not helping    Breathing getting harder    Trouble walking or talking    Nose opens wide to breathe       1. Take your rescue medicine NOW  2. If your provider has prescribed an oral steroid medicine, start taking it NOW  3. Call your doctor NOW  4. If you are still in the Red Zone after 20 minutes and you have not reached your doctor:    Take your rescue medicine again and    Call 911 or go to the emergency room right away    See your regular  doctor within 2 weeks of an Emergency Room or Urgent Care visit for follow-up treatment.          Annual Reminders:  Meet with Asthma Educator,  Flu Shot in the Fall, consider Pneumonia Vaccination for patients with asthma (aged 19 and older).    Pharmacy: [id number] [place], [place] - [address] [place] [facility] [place] [address]    Electronically signed by Bianca Nathan MD   Date: 12/04/19                    Asthma Triggers  How To Control Things That Make Your Asthma Worse    Triggers are things that make your asthma worse.  Look at the list below to help you find your triggers and   what you can do about them. You can help prevent asthma flare-ups by staying away from your triggers.      Trigger                                                          What you can do   Cigarette Smoke  Tobacco smoke can make asthma worse. Do not allow smoking in your home, car or around you.  Be sure no one smokes at a child s day care or school.  If you smoke, ask your health care provider for ways to help you quit.  Ask family members to quit too.  Ask your health care provider for a referral to Quit Plan to help you quit smoking, or call 9-830-572-PLAN.     Colds, Flu, Bronchitis  These are common triggers of asthma. Wash your hands often.  Don t touch your eyes, nose or mouth.  Get a flu shot every year.     Dust Mites  These are tiny bugs that live in cloth or carpet. They are too small to see. Wash sheets and blankets in hot water every week.   Encase pillows and mattress in dust mite proof covers.  Avoid having carpet if you can. If you have carpet, vacuum weekly.   Use a dust mask and HEPA vacuum.   Pollen and Outdoor Mold  Some people are allergic to trees, grass, or weed pollen, or molds. Try to keep your windows closed.  Limit time out doors when pollen count is high.   Ask you health care provider about taking medicine during allergy season.     Animal Dander  Some people are allergic to skin  flakes, urine or saliva from pets with fur or feathers. Keep pets with fur or feathers out of your home.    If you can t keep the pet outdoors, then keep the pet out of your bedroom.  Keep the bedroom door closed.  Keep pets off cloth furniture and away from stuffed toys.     Mice, Rats, and Cockroaches  Some people are allergic to the waste from these pests.   Cover food and garbage.  Clean up spills and food crumbs.  Store grease in the refrigerator.   Keep food out of the bedroom.   Indoor Mold  This can be a trigger if your home has high moisture. Fix leaking faucets, pipes, or other sources of water.   Clean moldy surfaces.  Dehumidify basement if it is damp and smelly.   Smoke, Strong Odors, and Sprays  These can reduce air quality. Stay away from strong odors and sprays, such as perfume, powder, hair spray, paints, smoke incense, paint, cleaning products, candles and new carpet.   Exercise or Sports  Some people with asthma have this trigger. Be active!  Ask your doctor about taking medicine before sports or exercise to prevent symptoms.    Warm up for 5-10 minutes before and after sports or exercise.     Other Triggers of Asthma  Cold air:  Cover your nose and mouth with a scarf.  Sometimes laughing or crying can be a trigger.  Some medicines and food can trigger asthma.

## 2019-12-05 LAB
ALBUMIN SERPL-MCNC: 3.8 G/DL (ref 3.4–5)
ALP SERPL-CCNC: 90 U/L (ref 40–150)
ALT SERPL W P-5'-P-CCNC: 22 U/L (ref 0–50)
ANION GAP SERPL CALCULATED.3IONS-SCNC: 9 MMOL/L (ref 3–14)
AST SERPL W P-5'-P-CCNC: 10 U/L (ref 0–45)
BILIRUB SERPL-MCNC: 0.5 MG/DL (ref 0.2–1.3)
BUN SERPL-MCNC: 15 MG/DL (ref 7–30)
CALCIUM SERPL-MCNC: 8.5 MG/DL (ref 8.5–10.1)
CHLORIDE SERPL-SCNC: 110 MMOL/L (ref 94–109)
CHOLEST SERPL-MCNC: 227 MG/DL
CO2 SERPL-SCNC: 21 MMOL/L (ref 20–32)
CREAT SERPL-MCNC: 0.67 MG/DL (ref 0.52–1.04)
CREAT UR-MCNC: 150 MG/DL
GFR SERPL CREATININE-BSD FRML MDRD: >90 ML/MIN/{1.73_M2}
GLUCOSE SERPL-MCNC: 103 MG/DL (ref 70–99)
HDLC SERPL-MCNC: 70 MG/DL
HIV 1+2 AB+HIV1 P24 AG SERPL QL IA: NONREACTIVE
LDLC SERPL CALC-MCNC: 128 MG/DL
MICROALBUMIN UR-MCNC: 19 MG/L
MICROALBUMIN/CREAT UR: 12.8 MG/G CR (ref 0–25)
NONHDLC SERPL-MCNC: 157 MG/DL
POTASSIUM SERPL-SCNC: 3.5 MMOL/L (ref 3.4–5.3)
PROT SERPL-MCNC: 7.2 G/DL (ref 6.8–8.8)
SODIUM SERPL-SCNC: 140 MMOL/L (ref 133–144)
TRIGL SERPL-MCNC: 144 MG/DL
TSH SERPL DL<=0.005 MIU/L-ACNC: 0.91 MU/L (ref 0.4–4)

## 2019-12-05 ASSESSMENT — ASTHMA QUESTIONNAIRES: ACT_TOTALSCORE: 25

## 2019-12-06 LAB
COPATH REPORT: NORMAL
PAP: NORMAL

## 2019-12-09 LAB
FINAL DIAGNOSIS: NORMAL
HPV HR 12 DNA CVX QL NAA+PROBE: NEGATIVE
HPV16 DNA SPEC QL NAA+PROBE: NEGATIVE
HPV18 DNA SPEC QL NAA+PROBE: NEGATIVE
SPECIMEN DESCRIPTION: NORMAL
SPECIMEN SOURCE CVX/VAG CYTO: NORMAL

## 2021-01-15 ENCOUNTER — HEALTH MAINTENANCE LETTER (OUTPATIENT)
Age: 51
End: 2021-01-15

## 2021-01-20 ENCOUNTER — MYC MEDICAL ADVICE (OUTPATIENT)
Dept: FAMILY MEDICINE | Facility: CLINIC | Age: 51
End: 2021-01-20

## 2021-01-20 ENCOUNTER — OFFICE VISIT (OUTPATIENT)
Dept: FAMILY MEDICINE | Facility: CLINIC | Age: 51
End: 2021-01-20
Payer: COMMERCIAL

## 2021-01-20 VITALS
HEART RATE: 78 BPM | SYSTOLIC BLOOD PRESSURE: 118 MMHG | BODY MASS INDEX: 33.82 KG/M2 | TEMPERATURE: 98.1 F | WEIGHT: 203 LBS | RESPIRATION RATE: 18 BRPM | OXYGEN SATURATION: 99 % | HEIGHT: 65 IN | DIASTOLIC BLOOD PRESSURE: 68 MMHG

## 2021-01-20 DIAGNOSIS — Z12.11 SCREEN FOR COLON CANCER: ICD-10-CM

## 2021-01-20 DIAGNOSIS — Z11.59 NEED FOR HEPATITIS C SCREENING TEST: ICD-10-CM

## 2021-01-20 DIAGNOSIS — Z83.3 FAMILY HISTORY OF DIABETES MELLITUS: ICD-10-CM

## 2021-01-20 DIAGNOSIS — Z83.49 FAMILY HISTORY OF THYROIDITIS: ICD-10-CM

## 2021-01-20 DIAGNOSIS — J45.20 MILD INTERMITTENT ASTHMA WITHOUT COMPLICATION: ICD-10-CM

## 2021-01-20 DIAGNOSIS — I10 BENIGN ESSENTIAL HYPERTENSION: ICD-10-CM

## 2021-01-20 DIAGNOSIS — Z13.6 CARDIOVASCULAR SCREENING; LDL GOAL LESS THAN 160: ICD-10-CM

## 2021-01-20 DIAGNOSIS — Z00.00 ROUTINE GENERAL MEDICAL EXAMINATION AT A HEALTH CARE FACILITY: Primary | ICD-10-CM

## 2021-01-20 DIAGNOSIS — I10 BENIGN ESSENTIAL HYPERTENSION: Primary | ICD-10-CM

## 2021-01-20 DIAGNOSIS — E66.09 CLASS 1 OBESITY DUE TO EXCESS CALORIES WITH BODY MASS INDEX (BMI) OF 33.0 TO 33.9 IN ADULT, UNSPECIFIED WHETHER SERIOUS COMORBIDITY PRESENT: ICD-10-CM

## 2021-01-20 DIAGNOSIS — E66.811 CLASS 1 OBESITY DUE TO EXCESS CALORIES WITH BODY MASS INDEX (BMI) OF 33.0 TO 33.9 IN ADULT, UNSPECIFIED WHETHER SERIOUS COMORBIDITY PRESENT: ICD-10-CM

## 2021-01-20 LAB
ALBUMIN SERPL-MCNC: 3.8 G/DL (ref 3.4–5)
ALP SERPL-CCNC: 105 U/L (ref 40–150)
ALT SERPL W P-5'-P-CCNC: 28 U/L (ref 0–50)
ANION GAP SERPL CALCULATED.3IONS-SCNC: 6 MMOL/L (ref 3–14)
AST SERPL W P-5'-P-CCNC: 13 U/L (ref 0–45)
BILIRUB SERPL-MCNC: 0.7 MG/DL (ref 0.2–1.3)
BUN SERPL-MCNC: 15 MG/DL (ref 7–30)
CALCIUM SERPL-MCNC: 8.9 MG/DL (ref 8.5–10.1)
CHLORIDE SERPL-SCNC: 108 MMOL/L (ref 94–109)
CHOLEST SERPL-MCNC: 231 MG/DL
CO2 SERPL-SCNC: 25 MMOL/L (ref 20–32)
CREAT SERPL-MCNC: 0.68 MG/DL (ref 0.52–1.04)
GFR SERPL CREATININE-BSD FRML MDRD: >90 ML/MIN/{1.73_M2}
GLUCOSE SERPL-MCNC: 118 MG/DL (ref 70–99)
HBA1C MFR BLD: 5.8 % (ref 0–5.6)
HCV AB SERPL QL IA: NONREACTIVE
HDLC SERPL-MCNC: 71 MG/DL
LDLC SERPL CALC-MCNC: 138 MG/DL
NONHDLC SERPL-MCNC: 160 MG/DL
POTASSIUM SERPL-SCNC: 4.5 MMOL/L (ref 3.4–5.3)
PROT SERPL-MCNC: 7.6 G/DL (ref 6.8–8.8)
SODIUM SERPL-SCNC: 139 MMOL/L (ref 133–144)
TRIGL SERPL-MCNC: 111 MG/DL
TSH SERPL DL<=0.005 MIU/L-ACNC: 0.99 MU/L (ref 0.4–4)

## 2021-01-20 PROCEDURE — 82043 UR ALBUMIN QUANTITATIVE: CPT | Performed by: FAMILY MEDICINE

## 2021-01-20 PROCEDURE — 84443 ASSAY THYROID STIM HORMONE: CPT | Performed by: FAMILY MEDICINE

## 2021-01-20 PROCEDURE — 86803 HEPATITIS C AB TEST: CPT | Performed by: FAMILY MEDICINE

## 2021-01-20 PROCEDURE — 36415 COLL VENOUS BLD VENIPUNCTURE: CPT | Performed by: FAMILY MEDICINE

## 2021-01-20 PROCEDURE — 83036 HEMOGLOBIN GLYCOSYLATED A1C: CPT | Performed by: FAMILY MEDICINE

## 2021-01-20 PROCEDURE — 80061 LIPID PANEL: CPT | Performed by: FAMILY MEDICINE

## 2021-01-20 PROCEDURE — 80053 COMPREHEN METABOLIC PANEL: CPT | Performed by: FAMILY MEDICINE

## 2021-01-20 PROCEDURE — 99396 PREV VISIT EST AGE 40-64: CPT | Performed by: FAMILY MEDICINE

## 2021-01-20 RX ORDER — IRBESARTAN 150 MG/1
TABLET ORAL
Qty: 90 TABLET | Refills: 3 | Status: SHIPPED | OUTPATIENT
Start: 2021-01-20 | End: 2022-01-26

## 2021-01-20 ASSESSMENT — ENCOUNTER SYMPTOMS
EYE PAIN: 0
HEADACHES: 0
DIARRHEA: 0
ARTHRALGIAS: 0
CONSTIPATION: 0
BREAST MASS: 0
COUGH: 0
SORE THROAT: 0
MYALGIAS: 0
HEARTBURN: 0
JOINT SWELLING: 0
DYSURIA: 0
DIZZINESS: 0
ABDOMINAL PAIN: 0
FEVER: 0
HEMATOCHEZIA: 0
NERVOUS/ANXIOUS: 0
WEAKNESS: 0
PALPITATIONS: 0
HEMATURIA: 0
SHORTNESS OF BREATH: 0
NAUSEA: 0
PARESTHESIAS: 0
CHILLS: 0
FREQUENCY: 0

## 2021-01-20 ASSESSMENT — PAIN SCALES - GENERAL: PAINLEVEL: NO PAIN (0)

## 2021-01-20 ASSESSMENT — MIFFLIN-ST. JEOR: SCORE: 1531.68

## 2021-01-20 NOTE — TELEPHONE ENCOUNTER
Please review and advise on mychart message.     Patient has questions from visit today on weight loss.     Akua Delvalle RN Flex

## 2021-01-20 NOTE — PATIENT INSTRUCTIONS
Preventive Health Recommendations  Female Ages 50 - 64    Yearly exam: See your health care provider every year in order to  o Review health changes.   o Discuss preventive care.    o Review your medicines if your doctor has prescribed any.      Get a Pap test every three years (unless you have an abnormal result and your provider advises testing more often).    If you get Pap tests with HPV test, you only need to test every 5 years, unless you have an abnormal result.     You do not need a Pap test if your uterus was removed (hysterectomy) and you have not had cancer.    You should be tested each year for STDs (sexually transmitted diseases) if you're at risk.     Have a mammogram every 1 to 2 years.    Have a colonoscopy at age 50, or have a yearly FIT test (stool test). These exams screen for colon cancer.      Have a cholesterol test every 5 years, or more often if advised.    Have a diabetes test (fasting glucose) every three years. If you are at risk for diabetes, you should have this test more often.     If you are at risk for osteoporosis (brittle bone disease), think about having a bone density scan (DEXA).    Shots: Get a flu shot each year. Get a tetanus shot every 10 years.    Nutrition:     Eat at least 5 servings of fruits and vegetables each day.    Eat whole-grain bread, whole-wheat pasta and brown rice instead of white grains and rice.    Get adequate Calcium and Vitamin D.     Lifestyle    Exercise at least 150 minutes a week (30 minutes a day, 5 days a week). This will help you control your weight and prevent disease.    Limit alcohol to one drink per day.    No smoking.     Wear sunscreen to prevent skin cancer.     See your dentist every six months for an exam and cleaning.    See your eye doctor every 1 to 2 years.      You may schedule your mammogram at [facility] by calling [phone] and asking to schedule your mammogram or you may schedule with [facility] in [place] by calling [phone].
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
## 2021-01-21 LAB
CREAT UR-MCNC: 177 MG/DL
MICROALBUMIN UR-MCNC: 155 MG/L
MICROALBUMIN/CREAT UR: 87.57 MG/G CR (ref 0–25)

## 2021-01-21 ASSESSMENT — ASTHMA QUESTIONNAIRES: ACT_TOTALSCORE: 25

## 2021-01-22 NOTE — TELEPHONE ENCOUNTER
Replied to patient as below. Awaiting Dr. Nathan to see message.     Cliff Leon CMA ([facility])

## 2021-01-23 ENCOUNTER — HEALTH MAINTENANCE LETTER (OUTPATIENT)
Age: 51
End: 2021-01-23

## 2021-01-24 RX ORDER — NALTREXONE HYDROCHLORIDE AND BUPROPION HYDROCHLORIDE 8; 90 MG/1; MG/1
TABLET, EXTENDED RELEASE ORAL
Qty: 90 TABLET | Refills: 0 | Status: SHIPPED | OUTPATIENT
Start: 2021-01-24 | End: 2021-02-17 | Stop reason: SINTOL

## 2021-01-25 DIAGNOSIS — I10 BENIGN ESSENTIAL HYPERTENSION: ICD-10-CM

## 2021-01-25 DIAGNOSIS — E66.811 CLASS 1 OBESITY DUE TO EXCESS CALORIES WITH BODY MASS INDEX (BMI) OF 33.0 TO 33.9 IN ADULT, UNSPECIFIED WHETHER SERIOUS COMORBIDITY PRESENT: ICD-10-CM

## 2021-01-25 DIAGNOSIS — E66.09 CLASS 1 OBESITY DUE TO EXCESS CALORIES WITH BODY MASS INDEX (BMI) OF 33.0 TO 33.9 IN ADULT, UNSPECIFIED WHETHER SERIOUS COMORBIDITY PRESENT: ICD-10-CM

## 2021-01-25 RX ORDER — NALTREXONE HYDROCHLORIDE AND BUPROPION HYDROCHLORIDE 8; 90 MG/1; MG/1
TABLET, EXTENDED RELEASE ORAL
Qty: 90 TABLET | Refills: 0 | OUTPATIENT
Start: 2021-01-25

## 2021-01-25 NOTE — TELEPHONE ENCOUNTER
RN spoke to patient who is agreeable to virtual visit     Scheduled 2/25/2021     Ana Laura Jackson Registered Nurse, PAL ([facility])   [facility]   [phone]

## 2021-01-25 NOTE — TELEPHONE ENCOUNTER
Note from Pharmacy:  Medication not covered by insurance.  A covered alternative may be available.  If you would like to complete Prior Authorization for this medication please visit;    [email or web address]    KEY: [id number]  Patient Last Name: Yarelis  : [birth year]

## 2021-01-25 NOTE — TELEPHONE ENCOUNTER
Duplicate. Pt has refills remaining at pharmacy. Denial sent with note to pharmacy requesting they review and fill medication.     Anali Justin RN   [facility] -- Triage Nurse

## 2021-01-27 ENCOUNTER — MYC MEDICAL ADVICE (OUTPATIENT)
Dept: FAMILY MEDICINE | Facility: CLINIC | Age: 51
End: 2021-01-27

## 2021-01-27 NOTE — TELEPHONE ENCOUNTER
Gastroenterology Progress Note      Subjective  The patient continues to complain of dysphagia.  She denies any abdominal pain, nausea, and or vomiting.  She is s/p barium esophagram study.    Objective    Last Recorded Vitals  Vitals with min/max:    Vital Last Value 24 Hour Range   Temperature 97.5 °F (36.4 °C) (12/28/20 1205) Temp  Min: 97.2 °F (36.2 °C)  Max: 98 °F (36.7 °C)   Pulse 65 (12/28/20 1208) Pulse  Min: 65  Max: 112   Respiratory 16 (12/28/20 1208) Resp  Min: 16  Max: 18   Non-Invasive  Blood Pressure 91/46 (12/28/20 1205) BP  Min: 86/53  Max: 119/54   Pulse Oximetry 100 % (12/28/20 1208) SpO2  Min: 94 %  Max: 100 %   Arterial   Blood Pressure   No data recorded       Physical Exam  General:  Alert, oriented, no distress.  Skin:  Warm and dry without rash or jaundice.    Respiratory:  Normal respiratory effort.    Gastrointestinal:  Soft and nontender.    Neurologic:   Oriented times 3.       Labs   Recent Labs   Lab 12/28/20  0455   WBC 15.1*   RBC 4.14   HGB 12.7   HCT 40.1        Recent Labs   Lab 12/28/20  0428 12/27/20  0413 12/26/20  0534   SODIUM 132* 131* 127*   CHLORIDE 94* 93* 92*   CO2 28 28 28   BUN 41* 25* 21*   CREATININE 2.41* 1.27* 1.09*   CALCIUM 8.8 8.8 9.4   ALBUMIN  --  3.1* 3.5*   BILIRUBIN  --  0.4 0.5   ALKPT  --  209* 234*   GPT  --  15 18   AST  --  29 34   GLUCOSE 83 75 97     Lab Results   Component Value Date    HGB 12.7 12/28/2020    HGB 13.4 12/27/2020    HGB 11.7 (L) 12/27/2020    HCT 40.1 12/28/2020    HCT 41.4 12/27/2020    HCT 36.0 12/27/2020    AST 29 12/27/2020    AST 34 12/26/2020    BILIRUBIN 0.4 12/27/2020    BILIRUBIN 0.5 12/26/2020    ALKPT 209 (H) 12/27/2020    ALKPT 234 (H) 12/26/2020    INR 1.0 12/26/2020        Imaging  CT ABDOMEN PELVIS W CONTRAST - IV contrast only   Final Result      1.  No acute intra-abdominal findings.   2.  Extensive pleural soft tissue nodularity bilaterally, consistent with pleural metastatic disease in this patient with  PA did not get addressed as it was sent as refill and got closed as has refills at pharmacy.  Do you want to send PA?  Please advise.  JARRELL Posadas Jen M         2:16 PM  Note     Note from Pharmacy:  Medication not covered by insurance.  A covered alternative may be available.  If you would like to complete Prior Authorization for this medication please visit;     [email or web address]     KEY: [id number]  Patient Last Name: Yarelis  : 66           history of malignancy.   3.  Large left and small-to-moderate right pleural effusions, which are incompletely included.   4.  Focal nodularity along the medial left breast soft tissues.  This could be better assessed with dedicated breast imaging.   5.  Extensive areas of osseous cortical thickening and coarsened trabeculation.  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  Osseous metastatic disease is difficult to exclude given the heterogeneity in some regions.  Consider correlation with    PET/CT or bone scintigraphy.   6.  Colonic diverticulosis.  Moderate to large colonic stool burden.   7.  Prominent soft tissue in the right diaphragmatic yuniel region could represent abnormal lymph node enlargement versus asymmetric prominence of the yuniel musculature.      Electronically Signed by: GRIFFIN CONDE M.D.    Signed on: 12/26/2020 7:48 AM          XR CHEST PA OR AP 1 VIEW   ED Interpretation   Andrey pleural effusions L>R      Final Result       There is a large left pleural effusion which opacifies more than half of the left hemithorax.  There is a moderate right pleural effusion.  Additional comments above.      Electronically Signed by: ROMEO VELIZ MD    Signed on: 12/26/2020 8:14 AM          FL Esophagram and Upper GI Single Contrast    (Results Pending)   IR PROCEDURE REQUEST    (Results Pending)   IR THORACENTESIS WITH IMAGING    (Results Pending)       Assessment  1. Dysphagia    This is a 89-year-old female with metastatic breast cancer who came in for shortness of breath secondary to pleural effusion and dysphagia.  Patient is s/p esophagram results are pending.  May consider endoscopy pending results of the esophagram.    PLAN  1. Await results of barium esophagram  2. Diet as tolerated  3. Need and timing of upper endoscopy to be determined pending results  4. Patient is planned to have thoracentesis today  5. We will follow  6. Will discuss with Dr. Shipley     Thank you for allowing  us to participate in the patients care. Please do not hesitate to call us with any questions or concerns.  Radha Gamboa CNP  [facility]   [phone]  12/28/2020

## 2021-01-27 NOTE — TELEPHONE ENCOUNTER
Can do PA but it will likely not be covered  Can try all the things she listed as reasons and see about it

## 2021-02-05 ENCOUNTER — HOSPITAL ENCOUNTER (OUTPATIENT)
Dept: MAMMOGRAPHY | Facility: CLINIC | Age: 51
Discharge: HOME | End: 2021-02-05
Attending: FAMILY MEDICINE | Admitting: FAMILY MEDICINE
Payer: COMMERCIAL

## 2021-02-05 DIAGNOSIS — Z00.00 ROUTINE GENERAL MEDICAL EXAMINATION AT A HEALTH CARE FACILITY: ICD-10-CM

## 2021-02-05 PROCEDURE — 77063 BREAST TOMOSYNTHESIS BI: CPT

## 2021-02-12 ENCOUNTER — MYC MEDICAL ADVICE (OUTPATIENT)
Dept: FAMILY MEDICINE | Facility: CLINIC | Age: 51
End: 2021-02-12

## 2021-02-12 NOTE — TELEPHONE ENCOUNTER
Is she taking the pill twice daily? If so, let's cut it down to once daily and see how she does with it.  If she is taking once daily and she is still having side effect, go ahead and stop it.

## 2021-02-12 NOTE — TELEPHONE ENCOUNTER
Covering providers as Dr. Simon is not in office today     Patient is having bad side effects from Contrave - nausea and headaches     Patient is wondering if she can stop this or if taper is needed ?     Are you able to assist with this as Dr. Simon is out for 3 more days?     Ana Laura Jackson, Registered Nurse, PAL (Patient [facility]ocate [employer])   [facility]   [phone]

## 2021-02-12 NOTE — TELEPHONE ENCOUNTER
Patient reported one daily. See MyChart reply.   We asked her for MyChart update early Monday.   Nitin Mae RN

## 2021-02-12 NOTE — TELEPHONE ENCOUNTER
Dr. Simon    Please review my chart message and advise on stopping contrave - does this need to taper? Or can patient just stop taking?     Would you like visit to discuss alternatives ?     Ana Laura Jackson, Registered Nurse, PAL (Patient [facility]ocate Liason)   [facility]   [phone]

## 2021-02-15 NOTE — TELEPHONE ENCOUNTER
Called patient and left voicemail to return call to clinic  *see notes below*    Tam Galdamez RN

## 2021-02-16 NOTE — TELEPHONE ENCOUNTER
Pt calls, discussed at length, declines weight loss clinic referral at this time, wants another weight loss medication prescription, recommend confirm benefits to see if covered, pt wants [facility] to give couple of weight loss medication names and will check with pharmacy cost etc, also had f/u appointment 2/25/21 for f/u contrave, wants [facility] to advise if still needed since stopped contrave, routed to [facility], please advise, MAY inform via [employer]  Ashley Maharaj RN, BSN  Message handled by CLINIC NURSE.

## 2021-02-17 ENCOUNTER — VIRTUAL VISIT (OUTPATIENT)
Dept: FAMILY MEDICINE | Facility: CLINIC | Age: 51
End: 2021-02-17
Payer: COMMERCIAL

## 2021-02-17 DIAGNOSIS — E66.09 CLASS 1 OBESITY DUE TO EXCESS CALORIES WITH BODY MASS INDEX (BMI) OF 33.0 TO 33.9 IN ADULT, UNSPECIFIED WHETHER SERIOUS COMORBIDITY PRESENT: Primary | ICD-10-CM

## 2021-02-17 DIAGNOSIS — E66.811 CLASS 1 OBESITY DUE TO EXCESS CALORIES WITH BODY MASS INDEX (BMI) OF 33.0 TO 33.9 IN ADULT, UNSPECIFIED WHETHER SERIOUS COMORBIDITY PRESENT: Primary | ICD-10-CM

## 2021-02-17 PROCEDURE — 99213 OFFICE O/P EST LOW 20 MIN: CPT | Mod: 95 | Performed by: FAMILY MEDICINE

## 2021-02-17 NOTE — TELEPHONE ENCOUNTER
Called patient and scheduled virtual visit for today, patient wondering if 2/25/21 virtual visit for contrave follow--up needs to be cancelled, will discuss at todays visit    Tam Galdamez RN

## 2021-02-17 NOTE — PATIENT INSTRUCTIONS
Patient Education     Orlistat capsules  Brand Names: alfred, Xenical  What is this medicine?  Orlistat (OR li stat) is used to help people lose weight and maintain weight loss while eating a reduced-calorie diet. This medicine decreases the amount of fat that is absorbed from your diet.  How should I use this medicine?  Take this medicine by mouth with a glass of water. Follow the directions on the prescription label. Take this medicine with each main meal that contains about 30 percent of the calories from fat or within 1 hour after each meal. Do not take your medicine more often than directed. If you occasionally miss a meal or have a meal without fat, you can skip that dose of this medicine.  Talk to your pediatrician regarding the use of this medicine in children. Special care may be needed. While this drug may be prescribed for children as young as 12 years for selected conditions, precautions do apply. Use of this medicine without a prescription is not approved in children less than 18 years.  What side effects may I notice from receiving this medicine?  Side effects that you should report to your doctor or health care professional as soon as possible:    allergic reactions like skin rash, itching or hives, swelling of the face, lips, or tongue    breathing problems    bloody or black, tarry stools    signs of infection like fever or chills    signs and symptoms of kidney stones like blood in the urine; pain in the lower back or side; pain when urinating    signs and symptoms of liver injury like dark yellow or brown urine; general ill feeling or flu-like symptoms; light-colored stools; loss of appetite; nausea; right upper belly pain; unusually weak or tired; yellowing of the eyes or skin    trouble passing urine or change in the amount of urine    uncontrolled, urgent bowel movements    vomiting  Side effects that usually do not require medical attention (report to your doctor or health care professional if  they continue or are bothersome):    increased number of bowel movements    oily stools (bowel movements may be clear, orange or brown in color)    stomach discomfort, gas  What may interact with this medicine?      amiodarone    antiviral medicines for HIV or AIDS    certain medicines for hepatitis    certain medicines that treat or prevent blood clots like warfarin, enoxaparin, dalteparin, apixaban, dabigatran, edoxaban, and rivaroxaban    cyclosporine    medicines for diabetes    medicines for seizures    other medicines or products for weight loss    supplements like vitamins A, D, E and K    thyroid hormones  What if I miss a dose?  If you miss a dose, take it within 1 hour following the meal that contains fat. If it is almost time for your next dose, take only that dose. Do not take double or extra doses. If you occasionally miss a meal or have a meal without fat, you can skip that dose of this medicine.  Where should I keep my medicine?  Keep out of the reach of children.  Storage at room temperature between 15 and 30 degrees C (59 and 86 degrees F). Keep container tightly closed. Throw away any unused medicine after the expiration date.  What should I tell my health care provider before I take this medicine?  They need to know if you have any of these conditions:    an eating disorder, such as anorexia or bulimia    diabetes    gallbladder disease    HIV or AIDS    kidney stones    liver disease or hepatitis    organ transplant    pancreatic disease    problems absorbing food    seizures    stomach or intestine problems    thyroid disease    take medicines that treat or prevent blood clots    an unusual or allergic reaction to orlistat, other medicines, foods, dyes, supplements or preservatives    pregnant or trying to get pregnant    breast-feeding  What should I watch for while using this medicine?  Do not use this medicine if you have had an organ transplant. This medicine interferes with some of the  medicines used to prevent transplant rejection.  This medicine can cause decreased absorption of some vitamins. You should take a daily multivitamin that contains normal amounts of vitamins D, E, K and beta-carotene or vitamin A. Take the multivitamin once per day at bedtime unless otherwise directed by your doctor or healthcare professional.  You should use this medicine with a reduced-calorie diet that contains no more than about 30 percent of the calories from fat. Divide your daily intake of fat, carbohydrates, and protein evenly over your 3 main meals. Follow a well-balanced, reduced-calorie, low fat diet. Try starting this diet before taking this medicine. Following a low fat diet can help reduce the possible side effects from this medicine.  Do not use this medicine if you are pregnant. Losing weight while pregnant is not advised and may cause harm to the unborn child. Talk to your health care professional or pharmacist for more information.  This medicine may cause a decrease in vitamin D. You should make sure that you get enough vitamin D while you are taking this medicine. Discuss the foods you eat and the vitamins you take with your health care professional.  NOTE:This sheet is a summary. It may not cover all possible information. If you have questions about this medicine, talk to your doctor, pharmacist, or health care provider. Copyright  2020 [employer]

## 2021-02-17 NOTE — PROGRESS NOTES
Eugenia is a 51 year old who is being evaluated via a billable telephone visit.      What phone number would you like to be contacted at? [phone]  How would you like to obtain your AVS? MyChart    Assessment & Plan     Class 1 obesity due to excess calories with body mass index (BMI) of 33.0 to 33.9 in adult, unspecified whether serious comorbidity present  Discussed belviq but discontinued and topamax and contrave gave side effects.   Discussed alfred and xenical and using metformin which can cause some weight loss    - metFORMIN (GLUCOPHAGE) 500 MG tablet; Take 1 tablet (500 mg) by mouth 2 times daily (with meals)  - Hemoglobin A1c; Future      15 minutes spent on the date of the encounter doing chart review, review of test results, interpretation of tests, patient visit and documentation        Work on weight loss  Regular exercise  See Patient Instructions    Return in about 3 months (around 5/17/2021) for Lab Work, video visit.    Bianca Nathan MD  [facility]   Eugenia is a 51 year old who presents for the following health issues - she is interested in trying something for weight loss.  She has tried contrave recently and she got very nauseated and headache.   She stayed on one pill per day but the headache persisted.       History of Present Illness       Diabetes:   She presents for follow up of diabetes.  She is not checking blood glucose. She has no concerns regarding her diabetes at this time.  She is not experiencing numbness or burning in feet, excessive thirst, blurry vision, weight changes or redness, sores or blisters on feet.         Hypertension: She presents for follow up of hypertension.  She does not check blood pressure  regularly outside of the clinic. Outpatient blood pressures have not been over 140/90. She does not follow a low salt diet.     She eats 2-3 servings of fruits and vegetables daily.She consumes 0 sweetened beverage(s) daily.She exercises  with enough effort to increase her heart rate 30 to 60 minutes per day.  She exercises with enough effort to increase her heart rate 3 or less days per week.   She is taking medications regularly.         Pt wants to discuss about  Trying new medication for weight loss.     Past Medical History:   Diagnosis Date     Benign heart murmur     since childhood     Intermittent asthma      Migraines        Past Surgical History:   Procedure Laterality Date     D & C      after miscarriage       MEDICATIONS:  Current Outpatient Medications   Medication     metFORMIN (GLUCOPHAGE) 500 MG tablet     irbesartan (AVAPRO) 150 MG tablet     No current facility-administered medications for this visit.        SOCIAL HISTORY:  Social History     Tobacco Use     Smoking status: Never Smoker     Smokeless tobacco: Never Used   Substance Use Topics     Alcohol use: Yes     Frequency: Monthly or less     Drinks per session: Patient refused     Binge frequency: Never     Comment: occas       Family History   Problem Relation Age of Onset     Cancer Mother         CLL--Chronic lymphatic luekemia     C.A.D. Father         MI at  ~40yrs old     Melanoma Father         stage 4 - did trial and he is in remission     Cancer Maternal Grandmother         ? type of cancer,  50's           Review of Systems   Constitutional, HEENT, cardiovascular, pulmonary, gi and gu systems are negative, except as otherwise noted.      Objective           Vitals:  No vitals were obtained today due to virtual visit.    Physical Exam   healthy, alert and no distress  PSYCH: Alert and oriented times 3; coherent speech, normal   rate and volume, able to articulate logical thoughts, able   to abstract reason, no tangential thoughts, no hallucinations   or delusions  Her affect is normal, pleasant and full  RESP: No cough, no audible wheezing, able to talk in full sentences  Remainder of exam unable to be completed due to telephone visits    Office Visit  on 01/20/2021   Component Date Value Ref Range Status     Hepatitis C Antibody 01/20/2021 Nonreactive  NR^Nonreactive Final    Comment: Assay performance characteristics have not been established for newborns,   infants, and children       Sodium 01/20/2021 139  133 - 144 mmol/L Final     Potassium 01/20/2021 4.5  3.4 - 5.3 mmol/L Final     Chloride 01/20/2021 108  94 - 109 mmol/L Final     Carbon Dioxide 01/20/2021 25  20 - 32 mmol/L Final     Anion Gap 01/20/2021 6  3 - 14 mmol/L Final     Glucose 01/20/2021 118* 70 - 99 mg/dL Final     Urea Nitrogen 01/20/2021 15  7 - 30 mg/dL Final     Creatinine 01/20/2021 0.68  0.52 - 1.04 mg/dL Final     GFR Estimate 01/20/2021 >90  >60 mL/min/[1.73_m2] Final    Comment: Non  GFR Calc  Starting 12/18/2018, serum creatinine based estimated GFR (eGFR) will be   calculated using the Chronic Kidney Disease Epidemiology Collaboration   (CKD-EPI) equation.       GFR Estimate If Black 01/20/2021 >90  >60 mL/min/[1.73_m2] Final    Comment:  GFR Calc  Starting 12/18/2018, serum creatinine based estimated GFR (eGFR) will be   calculated using the Chronic Kidney Disease Epidemiology Collaboration   (CKD-EPI) equation.       Calcium 01/20/2021 8.9  8.5 - 10.1 mg/dL Final     Bilirubin Total 01/20/2021 0.7  0.2 - 1.3 mg/dL Final     Albumin 01/20/2021 3.8  3.4 - 5.0 g/dL Final     Protein Total 01/20/2021 7.6  6.8 - 8.8 g/dL Final     Alkaline Phosphatase 01/20/2021 105  40 - 150 U/L Final     ALT 01/20/2021 28  0 - 50 U/L Final     AST 01/20/2021 13  0 - 45 U/L Final     Hemoglobin A1C 01/20/2021 5.8* 0 - 5.6 % Final    Comment: Normal <5.7% Prediabetes 5.7-6.4%  Diabetes 6.5% or higher - adopted from ADA   consensus guidelines.       Creatinine Urine 01/20/2021 177  mg/dL Final     Albumin Urine mg/L 01/20/2021 155  mg/L Final     Albumin Urine mg/g Cr 01/20/2021 87.57* 0 - 25 mg/g Cr Final     Cholesterol 01/20/2021 231* <200 mg/dL Final    Desirable:        <200 mg/dl     Triglycerides 01/20/2021 111  <150 mg/dL Final     HDL Cholesterol 01/20/2021 71  >49 mg/dL Final     LDL Cholesterol Calculated 01/20/2021 138* <100 mg/dL Final    Comment: Above desirable:  100-129 mg/dl  Borderline High:  130-159 mg/dL  High:             160-189 mg/dL  Very high:       >189 mg/dl       Non HDL Cholesterol 01/20/2021 160* <130 mg/dL Final    Comment: Above Desirable:  130-159 mg/dl  Borderline high:  160-189 mg/dl  High:             190-219 mg/dl  Very high:       >219 mg/dl       TSH 01/20/2021 0.99  0.40 - 4.00 mU/L Final               Phone call duration: 10 minutes

## 2021-10-24 ENCOUNTER — HEALTH MAINTENANCE LETTER (OUTPATIENT)
Age: 51
End: 2021-10-24

## 2022-01-23 DIAGNOSIS — I10 BENIGN ESSENTIAL HYPERTENSION: ICD-10-CM

## 2022-01-26 RX ORDER — IRBESARTAN 150 MG/1
TABLET ORAL
Qty: 90 TABLET | Refills: 0 | Status: SHIPPED | OUTPATIENT
Start: 2022-01-26 | End: 2022-02-09

## 2022-01-26 NOTE — TELEPHONE ENCOUNTER
Medication is being filled for 1 time refill only due to:  Patient needs labs Multiple labs. . Patient needs to be seen because it has been more than one year since last visit.     Routing to MA/TC pool. The Pt is due for a visit with PCP. Please call and help them schedule.    RN will issue a 90 day supply. No further refills until the Pt is seen.       Anali Justin RN   [facility]  -- Triage Nurse

## 2022-02-07 ENCOUNTER — E-VISIT (OUTPATIENT)
Dept: URGENT CARE | Facility: URGENT CARE | Age: 52
End: 2022-02-07
Payer: COMMERCIAL

## 2022-02-07 DIAGNOSIS — J40 BRONCHITIS: Primary | ICD-10-CM

## 2022-02-07 PROCEDURE — 99421 OL DIG E/M SVC 5-10 MIN: CPT | Performed by: PHYSICIAN ASSISTANT

## 2022-02-07 RX ORDER — DEXTROMETHORPHAN POLISTIREX 30 MG/5ML
60 SUSPENSION ORAL 2 TIMES DAILY
Qty: 148 ML | Refills: 0 | Status: SHIPPED | OUTPATIENT
Start: 2022-02-07 | End: 2023-04-05

## 2022-02-07 RX ORDER — AZITHROMYCIN 250 MG/1
TABLET, FILM COATED ORAL
Qty: 6 TABLET | Refills: 0 | Status: SHIPPED | OUTPATIENT
Start: 2022-02-07 | End: 2022-02-12

## 2022-02-07 NOTE — PATIENT INSTRUCTIONS
"    Dear Eugenia Santoyo    After reviewing your responses, I've been able to diagnose you with \"Bronchitis\" which is a common infection of your lungs. While this is most commonly caused by a virus, the symptoms you have given suggest you should be treated with antibiotics.     I have sent medications to your pharmacy to treat this infection.     It is important that you take all of your prescribed medication even if your symptoms are improving after a few doses. Taking all of your medicine helps prevent the symptoms from returning.     If your symptoms worsen, you develop chest pain or shortness of breath, fevers over 101, or are not improving in 5 days, please contact your primary care provider for an appointment or visit any of our convenient Walk-in Care or Urgent Care Centers to be seen which can be found on our website here.    Thanks again for choosing us as your health care partner,    Jermaine Morton PA-C    "

## 2022-02-09 ENCOUNTER — OFFICE VISIT (OUTPATIENT)
Dept: FAMILY MEDICINE | Facility: CLINIC | Age: 52
End: 2022-02-09
Payer: COMMERCIAL

## 2022-02-09 ENCOUNTER — PATIENT OUTREACH (OUTPATIENT)
Dept: ONCOLOGY | Facility: CLINIC | Age: 52
End: 2022-02-09

## 2022-02-09 VITALS
DIASTOLIC BLOOD PRESSURE: 86 MMHG | WEIGHT: 203.6 LBS | HEART RATE: 97 BPM | TEMPERATURE: 98.5 F | SYSTOLIC BLOOD PRESSURE: 144 MMHG | HEIGHT: 66 IN | BODY MASS INDEX: 32.72 KG/M2 | OXYGEN SATURATION: 98 %

## 2022-02-09 DIAGNOSIS — Z83.49 FAMILY HISTORY OF THYROIDITIS: ICD-10-CM

## 2022-02-09 DIAGNOSIS — Z80.3 FAMILY HISTORY OF MALIGNANT NEOPLASM OF BREAST: Primary | ICD-10-CM

## 2022-02-09 DIAGNOSIS — I10 BENIGN ESSENTIAL HYPERTENSION: ICD-10-CM

## 2022-02-09 DIAGNOSIS — J45.20 MILD INTERMITTENT ASTHMA WITHOUT COMPLICATION: ICD-10-CM

## 2022-02-09 DIAGNOSIS — Z00.00 ROUTINE GENERAL MEDICAL EXAMINATION AT A HEALTH CARE FACILITY: ICD-10-CM

## 2022-02-09 DIAGNOSIS — Z12.11 SCREEN FOR COLON CANCER: ICD-10-CM

## 2022-02-09 DIAGNOSIS — Z83.3 FAMILY HISTORY OF DIABETES MELLITUS: ICD-10-CM

## 2022-02-09 LAB
ALBUMIN SERPL-MCNC: 3.8 G/DL (ref 3.4–5)
ALP SERPL-CCNC: 113 U/L (ref 40–150)
ALT SERPL W P-5'-P-CCNC: 33 U/L (ref 0–50)
ANION GAP SERPL CALCULATED.3IONS-SCNC: 6 MMOL/L (ref 3–14)
AST SERPL W P-5'-P-CCNC: 21 U/L (ref 0–45)
BILIRUB SERPL-MCNC: 0.7 MG/DL (ref 0.2–1.3)
BUN SERPL-MCNC: 10 MG/DL (ref 7–30)
CALCIUM SERPL-MCNC: 9.5 MG/DL (ref 8.5–10.1)
CHLORIDE BLD-SCNC: 105 MMOL/L (ref 94–109)
CHOLEST SERPL-MCNC: 214 MG/DL
CO2 SERPL-SCNC: 25 MMOL/L (ref 20–32)
CREAT SERPL-MCNC: 0.79 MG/DL (ref 0.52–1.04)
FASTING STATUS PATIENT QL REPORTED: NO
GFR SERPL CREATININE-BSD FRML MDRD: 88 ML/MIN/1.73M2
GLUCOSE BLD-MCNC: 103 MG/DL (ref 70–99)
HBA1C MFR BLD: 5.4 % (ref 0–5.6)
HDLC SERPL-MCNC: 64 MG/DL
LDLC SERPL CALC-MCNC: 121 MG/DL
NONHDLC SERPL-MCNC: 150 MG/DL
POTASSIUM BLD-SCNC: 4.4 MMOL/L (ref 3.4–5.3)
PROT SERPL-MCNC: 7.9 G/DL (ref 6.8–8.8)
SODIUM SERPL-SCNC: 136 MMOL/L (ref 133–144)
TRIGL SERPL-MCNC: 145 MG/DL
TSH SERPL DL<=0.005 MIU/L-ACNC: 0.76 MU/L (ref 0.4–4)

## 2022-02-09 PROCEDURE — 84443 ASSAY THYROID STIM HORMONE: CPT | Performed by: FAMILY MEDICINE

## 2022-02-09 PROCEDURE — 80061 LIPID PANEL: CPT | Performed by: FAMILY MEDICINE

## 2022-02-09 PROCEDURE — 36415 COLL VENOUS BLD VENIPUNCTURE: CPT | Performed by: FAMILY MEDICINE

## 2022-02-09 PROCEDURE — 80053 COMPREHEN METABOLIC PANEL: CPT | Performed by: FAMILY MEDICINE

## 2022-02-09 PROCEDURE — 99396 PREV VISIT EST AGE 40-64: CPT | Performed by: FAMILY MEDICINE

## 2022-02-09 PROCEDURE — 83036 HEMOGLOBIN GLYCOSYLATED A1C: CPT | Performed by: FAMILY MEDICINE

## 2022-02-09 PROCEDURE — 82043 UR ALBUMIN QUANTITATIVE: CPT | Performed by: FAMILY MEDICINE

## 2022-02-09 RX ORDER — IRBESARTAN 300 MG/1
300 TABLET ORAL DAILY
Qty: 90 TABLET | Refills: 1 | Status: SHIPPED | OUTPATIENT
Start: 2022-02-09 | End: 2022-07-29

## 2022-02-09 SDOH — HEALTH STABILITY: PHYSICAL HEALTH: ON AVERAGE, HOW MANY DAYS PER WEEK DO YOU ENGAGE IN MODERATE TO STRENUOUS EXERCISE (LIKE A BRISK WALK)?: 3 DAYS

## 2022-02-09 SDOH — ECONOMIC STABILITY: FOOD INSECURITY: WITHIN THE PAST 12 MONTHS, YOU WORRIED THAT YOUR FOOD WOULD RUN OUT BEFORE YOU GOT MONEY TO BUY MORE.: NEVER TRUE

## 2022-02-09 SDOH — HEALTH STABILITY: PHYSICAL HEALTH: ON AVERAGE, HOW MANY MINUTES DO YOU ENGAGE IN EXERCISE AT THIS LEVEL?: 40 MIN

## 2022-02-09 SDOH — ECONOMIC STABILITY: INCOME INSECURITY: HOW HARD IS IT FOR YOU TO PAY FOR THE VERY BASICS LIKE FOOD, HOUSING, MEDICAL CARE, AND HEATING?: NOT HARD AT ALL

## 2022-02-09 SDOH — ECONOMIC STABILITY: INCOME INSECURITY: IN THE LAST 12 MONTHS, WAS THERE A TIME WHEN YOU WERE NOT ABLE TO PAY THE MORTGAGE OR RENT ON TIME?: NO

## 2022-02-09 SDOH — ECONOMIC STABILITY: FOOD INSECURITY: WITHIN THE PAST 12 MONTHS, THE FOOD YOU BOUGHT JUST DIDN'T LAST AND YOU DIDN'T HAVE MONEY TO GET MORE.: NEVER TRUE

## 2022-02-09 ASSESSMENT — ENCOUNTER SYMPTOMS
DIZZINESS: 0
CHILLS: 0
PALPITATIONS: 0
NERVOUS/ANXIOUS: 0
DYSURIA: 0
ARTHRALGIAS: 0
EYE PAIN: 0
COUGH: 1
NAUSEA: 0
FREQUENCY: 0
JOINT SWELLING: 0
BREAST MASS: 0
SORE THROAT: 0
MYALGIAS: 0
WEAKNESS: 0
HEARTBURN: 1
HEMATURIA: 0
DIARRHEA: 0
HEMATOCHEZIA: 0
ABDOMINAL PAIN: 0
FEVER: 0
SHORTNESS OF BREATH: 0
HEADACHES: 1
CONSTIPATION: 0
PARESTHESIAS: 0

## 2022-02-09 ASSESSMENT — LIFESTYLE VARIABLES
HOW OFTEN DO YOU HAVE A DRINK CONTAINING ALCOHOL: MONTHLY OR LESS
HOW OFTEN DO YOU HAVE SIX OR MORE DRINKS ON ONE OCCASION: NEVER
HOW MANY STANDARD DRINKS CONTAINING ALCOHOL DO YOU HAVE ON A TYPICAL DAY: 1 OR 2

## 2022-02-09 ASSESSMENT — SOCIAL DETERMINANTS OF HEALTH (SDOH)
HOW OFTEN DO YOU GET TOGETHER WITH FRIENDS OR RELATIVES?: ONCE A WEEK
HOW OFTEN DO YOU ATTEND CHURCH OR RELIGIOUS SERVICES?: PATIENT DECLINED
DO YOU BELONG TO ANY CLUBS OR ORGANIZATIONS SUCH AS CHURCH GROUPS UNIONS, FRATERNAL OR ATHLETIC GROUPS, OR SCHOOL GROUPS?: NO

## 2022-02-09 ASSESSMENT — MIFFLIN-ST. JEOR: SCORE: 1540.27
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
## 2022-02-10 LAB
CREAT UR-MCNC: 81 MG/DL
MICROALBUMIN UR-MCNC: 49 MG/L
MICROALBUMIN/CREAT UR: 60.49 MG/G CR (ref 0–25)

## 2022-02-11 ENCOUNTER — HOSPITAL ENCOUNTER (OUTPATIENT)
Dept: MAMMOGRAPHY | Facility: CLINIC | Age: 52
Discharge: HOME | End: 2022-02-11
Attending: FAMILY MEDICINE | Admitting: FAMILY MEDICINE
Payer: COMMERCIAL

## 2022-02-11 DIAGNOSIS — Z12.31 VISIT FOR SCREENING MAMMOGRAM: ICD-10-CM

## 2022-02-11 PROCEDURE — 77067 SCR MAMMO BI INCL CAD: CPT

## 2022-04-22 ENCOUNTER — TELEPHONE (OUTPATIENT)
Dept: FAMILY MEDICINE | Facility: CLINIC | Age: 52
End: 2022-04-22
Payer: COMMERCIAL

## 2022-04-22 NOTE — LETTER
[facility]  [address] [id number] [phone]  April 22, 2022    Eugenia Santoyo  [address] [address] [address]    Dear Eugenia,    I care about your health and have reviewed your health plan. I have reviewed your medical conditions, medication list, and lab results and am making recommendations based on this review, to better manage your health.    You are in particular need of attention regarding:  -Asthma  -Colon Cancer Screening    I am recommending that you:  {recommendations:-schedule a COLONOSCOPY to look for colon cancer (due every 10 years or 5 years in higher risk situations.)   Colon cancer is now the second leading cause of death in the [place] for both men and women and there are over 130,000 new cases and 50,000 deaths per year from colon cancer.  Colonoscopies can prevent 90-95% of these deaths.  Problem lesions can be removed before they ever become cancer.  This test is not only looking for cancer, but also getting rid of precancerious lesions.  If you do not wish to do a colonoscopy or cannot afford to do one, at this time, there is another option. It is called a FIT test or Fecal Immunochemical Occult Blood Test (take home stool sample kit).  It does not replace the colonoscopy for colorectal cancer screening, but it can detect hidden bleeding in the lower colon.  It does need to be repeated every year and if a positive result is obtained, you would be referred for a colonoscopy.  If you have completed either one of these tests at another facility, please have the records sent to our clinic so that we can best coordinate your care.   -Complete and return the attached ASTHMA CONTROL TEST.  If your total score is 19 or less or you have been to the ER or urgent care for your asthma, then please schedule an asthma followup appointment.    Here is a list of Health Maintenance topics that are due now or due  soon:  Health Maintenance Due   Topic Date Due     Pneumococcal Vaccine: Pediatrics (0 to 5 Years) and At-Risk Patients (6 to 64 Years) (1 of 2 - PPSV23) Never done     COLORECTAL CANCER SCREENING  Never done     ZOSTER IMMUNIZATION (1 of 2) Never done     ASTHMA CONTROL TEST  07/20/2021     INFLUENZA VACCINE (1) 09/01/2021       Please call us at [phone] (or use [employer]) to address the above recommendations.     Thank you for trusting [facility] and we appreciate the opportunity to serve you.  We look forward to supporting your healthcare needs in the future.    Healthy Regards,    Bianca Fatima MD

## 2022-04-22 NOTE — TELEPHONE ENCOUNTER
Patient Quality Outreach    Patient is due for the following:   Asthma  -  ACT needed  Colon Cancer Screening -  Colonoscopy    NEXT STEPS:   Schedule a office visit for referral for colonoscopy    Type of outreach:    Sent letter.      Questions for provider review:    None     Huyen Smith, [facility]

## 2022-05-03 ENCOUNTER — MYC MEDICAL ADVICE (OUTPATIENT)
Dept: FAMILY MEDICINE | Facility: CLINIC | Age: 52
End: 2022-05-03
Payer: COMMERCIAL

## 2022-05-05 ENCOUNTER — ALLIED HEALTH/NURSE VISIT (OUTPATIENT)
Dept: FAMILY MEDICINE | Facility: CLINIC | Age: 52
End: 2022-05-05
Payer: COMMERCIAL

## 2022-05-05 VITALS — DIASTOLIC BLOOD PRESSURE: 84 MMHG | SYSTOLIC BLOOD PRESSURE: 128 MMHG

## 2022-05-05 DIAGNOSIS — I10 BENIGN ESSENTIAL HYPERTENSION: Primary | ICD-10-CM

## 2022-05-05 PROCEDURE — 99207 PR NO CHARGE NURSE ONLY: CPT

## 2022-05-05 NOTE — PROGRESS NOTES
Eugenia Santoyo is a 52 year old patient who comes in today for a Blood Pressure check.  Initial BP:  /84 (BP Location: Left arm, Patient Position: Sitting, Cuff Size: Adult Large)        Disposition: follow-up as previously indicated by provider  Rekha Carlin CMA

## 2022-07-28 DIAGNOSIS — I10 BENIGN ESSENTIAL HYPERTENSION: ICD-10-CM

## 2022-07-29 RX ORDER — IRBESARTAN 300 MG/1
TABLET ORAL
Qty: 90 TABLET | Refills: 1 | Status: SHIPPED | OUTPATIENT
Start: 2022-07-29 | End: 2023-02-06

## 2022-07-29 NOTE — TELEPHONE ENCOUNTER
Prescription approved per [facility] Refill Protocol.    Lynn Alvarez RN

## 2022-10-16 ENCOUNTER — HEALTH MAINTENANCE LETTER (OUTPATIENT)
Age: 52
End: 2022-10-16

## 2022-11-18 ENCOUNTER — TRANSFERRED RECORDS (OUTPATIENT)
Dept: HEALTH INFORMATION MANAGEMENT | Facility: CLINIC | Age: 52
End: 2022-11-18

## 2022-11-18 LAB — RETINOPATHY: NEGATIVE

## 2023-02-04 DIAGNOSIS — I10 BENIGN ESSENTIAL HYPERTENSION: ICD-10-CM

## 2023-02-06 RX ORDER — IRBESARTAN 300 MG/1
300 TABLET ORAL DAILY
Qty: 90 TABLET | Refills: 0 | Status: SHIPPED | OUTPATIENT
Start: 2023-02-06 | End: 2023-04-05

## 2023-03-16 ENCOUNTER — HOSPITAL ENCOUNTER (OUTPATIENT)
Dept: MAMMOGRAPHY | Facility: CLINIC | Age: 53
Discharge: HOME | End: 2023-03-16
Attending: FAMILY MEDICINE | Admitting: FAMILY MEDICINE
Payer: COMMERCIAL

## 2023-03-16 DIAGNOSIS — Z12.31 VISIT FOR SCREENING MAMMOGRAM: ICD-10-CM

## 2023-03-16 PROCEDURE — 77067 SCR MAMMO BI INCL CAD: CPT

## 2023-03-26 ENCOUNTER — HEALTH MAINTENANCE LETTER (OUTPATIENT)
Age: 53
End: 2023-03-26

## 2023-04-04 SDOH — HEALTH STABILITY: PHYSICAL HEALTH: ON AVERAGE, HOW MANY MINUTES DO YOU ENGAGE IN EXERCISE AT THIS LEVEL?: 40 MIN

## 2023-04-04 SDOH — ECONOMIC STABILITY: INCOME INSECURITY: IN THE LAST 12 MONTHS, WAS THERE A TIME WHEN YOU WERE NOT ABLE TO PAY THE MORTGAGE OR RENT ON TIME?: NO

## 2023-04-04 SDOH — ECONOMIC STABILITY: FOOD INSECURITY: WITHIN THE PAST 12 MONTHS, THE FOOD YOU BOUGHT JUST DIDN'T LAST AND YOU DIDN'T HAVE MONEY TO GET MORE.: NEVER TRUE

## 2023-04-04 SDOH — ECONOMIC STABILITY: FOOD INSECURITY: WITHIN THE PAST 12 MONTHS, YOU WORRIED THAT YOUR FOOD WOULD RUN OUT BEFORE YOU GOT MONEY TO BUY MORE.: NEVER TRUE

## 2023-04-04 SDOH — HEALTH STABILITY: PHYSICAL HEALTH: ON AVERAGE, HOW MANY DAYS PER WEEK DO YOU ENGAGE IN MODERATE TO STRENUOUS EXERCISE (LIKE A BRISK WALK)?: 3 DAYS

## 2023-04-04 SDOH — ECONOMIC STABILITY: INCOME INSECURITY: HOW HARD IS IT FOR YOU TO PAY FOR THE VERY BASICS LIKE FOOD, HOUSING, MEDICAL CARE, AND HEATING?: NOT HARD AT ALL

## 2023-04-04 ASSESSMENT — LIFESTYLE VARIABLES
AUDIT-C TOTAL SCORE: 2
HOW OFTEN DO YOU HAVE SIX OR MORE DRINKS ON ONE OCCASION: NEVER
HOW OFTEN DO YOU HAVE A DRINK CONTAINING ALCOHOL: 2-4 TIMES A MONTH
HOW MANY STANDARD DRINKS CONTAINING ALCOHOL DO YOU HAVE ON A TYPICAL DAY: 1 OR 2
SKIP TO QUESTIONS 9-10: 1

## 2023-04-04 ASSESSMENT — ENCOUNTER SYMPTOMS
HEMATOCHEZIA: 0
HEARTBURN: 1
BREAST MASS: 0
DIARRHEA: 0
ABDOMINAL PAIN: 0
JOINT SWELLING: 0
NERVOUS/ANXIOUS: 0
MYALGIAS: 0
FEVER: 0
COUGH: 0
PARESTHESIAS: 0
NAUSEA: 0
WEAKNESS: 0
CHILLS: 0
FREQUENCY: 0
EYE PAIN: 0
SORE THROAT: 0
CONSTIPATION: 0
SHORTNESS OF BREATH: 0
HEADACHES: 0
HEMATURIA: 0
PALPITATIONS: 0
DYSURIA: 0
DIZZINESS: 0
ARTHRALGIAS: 1

## 2023-04-04 ASSESSMENT — ASTHMA QUESTIONNAIRES
QUESTION_5 LAST FOUR WEEKS HOW WOULD YOU RATE YOUR ASTHMA CONTROL: COMPLETELY CONTROLLED
ACT_TOTALSCORE: 25
QUESTION_1 LAST FOUR WEEKS HOW MUCH OF THE TIME DID YOUR ASTHMA KEEP YOU FROM GETTING AS MUCH DONE AT WORK, SCHOOL OR AT HOME: NONE OF THE TIME
QUESTION_3 LAST FOUR WEEKS HOW OFTEN DID YOUR ASTHMA SYMPTOMS (WHEEZING, COUGHING, SHORTNESS OF BREATH, CHEST TIGHTNESS OR PAIN) WAKE YOU UP AT NIGHT OR EARLIER THAN USUAL IN THE MORNING: NOT AT ALL
QUESTION_4 LAST FOUR WEEKS HOW OFTEN HAVE YOU USED YOUR RESCUE INHALER OR NEBULIZER MEDICATION (SUCH AS ALBUTEROL): NOT AT ALL
QUESTION_2 LAST FOUR WEEKS HOW OFTEN HAVE YOU HAD SHORTNESS OF BREATH: NOT AT ALL
ACT_TOTALSCORE: 25

## 2023-04-04 ASSESSMENT — SOCIAL DETERMINANTS OF HEALTH (SDOH)
DO YOU BELONG TO ANY CLUBS OR ORGANIZATIONS SUCH AS CHURCH GROUPS UNIONS, FRATERNAL OR ATHLETIC GROUPS, OR SCHOOL GROUPS?: NO
HOW OFTEN DO YOU ATTEND CHURCH OR RELIGIOUS SERVICES?: PATIENT DECLINED
IN A TYPICAL WEEK, HOW MANY TIMES DO YOU TALK ON THE PHONE WITH FAMILY, FRIENDS, OR NEIGHBORS?: ONCE A WEEK
HOW OFTEN DO YOU GET TOGETHER WITH FRIENDS OR RELATIVES?: ONCE A WEEK

## 2023-04-05 ENCOUNTER — OFFICE VISIT (OUTPATIENT)
Dept: FAMILY MEDICINE | Facility: CLINIC | Age: 53
End: 2023-04-05
Payer: COMMERCIAL

## 2023-04-05 VITALS
HEIGHT: 66 IN | SYSTOLIC BLOOD PRESSURE: 126 MMHG | HEART RATE: 104 BPM | OXYGEN SATURATION: 99 % | BODY MASS INDEX: 32.85 KG/M2 | RESPIRATION RATE: 14 BRPM | DIASTOLIC BLOOD PRESSURE: 70 MMHG | TEMPERATURE: 97.7 F | WEIGHT: 204.4 LBS

## 2023-04-05 DIAGNOSIS — Z83.49 FAMILY HISTORY OF THYROIDITIS: ICD-10-CM

## 2023-04-05 DIAGNOSIS — E11.9 TYPE 2 DIABETES MELLITUS WITHOUT COMPLICATION, WITHOUT LONG-TERM CURRENT USE OF INSULIN (H): ICD-10-CM

## 2023-04-05 DIAGNOSIS — Z12.11 SCREEN FOR COLON CANCER: ICD-10-CM

## 2023-04-05 DIAGNOSIS — Z00.00 ROUTINE GENERAL MEDICAL EXAMINATION AT A HEALTH CARE FACILITY: ICD-10-CM

## 2023-04-05 DIAGNOSIS — J45.20 MILD INTERMITTENT ASTHMA WITHOUT COMPLICATION: ICD-10-CM

## 2023-04-05 DIAGNOSIS — Z12.4 CERVICAL CANCER SCREENING: ICD-10-CM

## 2023-04-05 DIAGNOSIS — I10 BENIGN ESSENTIAL HYPERTENSION: ICD-10-CM

## 2023-04-05 DIAGNOSIS — Z83.3 FAMILY HISTORY OF DIABETES MELLITUS: Primary | ICD-10-CM

## 2023-04-05 LAB
ANION GAP SERPL CALCULATED.3IONS-SCNC: 17 MMOL/L (ref 7–15)
BUN SERPL-MCNC: 14.8 MG/DL (ref 6–20)
CALCIUM SERPL-MCNC: 10.1 MG/DL (ref 8.6–10)
CHLORIDE SERPL-SCNC: 100 MMOL/L (ref 98–107)
CREAT SERPL-MCNC: 0.73 MG/DL (ref 0.51–0.95)
CREAT UR-MCNC: 203 MG/DL
DEPRECATED HCO3 PLAS-SCNC: 21 MMOL/L (ref 22–29)
GFR SERPL CREATININE-BSD FRML MDRD: >90 ML/MIN/1.73M2
GLUCOSE SERPL-MCNC: 247 MG/DL (ref 70–99)
HBA1C MFR BLD: 6.8 % (ref 0–5.6)
MICROALBUMIN UR-MCNC: 74.3 MG/L
MICROALBUMIN/CREAT UR: 36.6 MG/G CR (ref 0–25)
POTASSIUM SERPL-SCNC: 3.7 MMOL/L (ref 3.4–5.3)
SODIUM SERPL-SCNC: 138 MMOL/L (ref 136–145)
TSH SERPL DL<=0.005 MIU/L-ACNC: 1.02 UIU/ML (ref 0.3–4.2)

## 2023-04-05 PROCEDURE — 99396 PREV VISIT EST AGE 40-64: CPT | Performed by: FAMILY MEDICINE

## 2023-04-05 PROCEDURE — 80048 BASIC METABOLIC PNL TOTAL CA: CPT | Performed by: FAMILY MEDICINE

## 2023-04-05 PROCEDURE — 83036 HEMOGLOBIN GLYCOSYLATED A1C: CPT | Performed by: FAMILY MEDICINE

## 2023-04-05 PROCEDURE — 84443 ASSAY THYROID STIM HORMONE: CPT | Performed by: FAMILY MEDICINE

## 2023-04-05 PROCEDURE — 82570 ASSAY OF URINE CREATININE: CPT | Performed by: FAMILY MEDICINE

## 2023-04-05 PROCEDURE — 36415 COLL VENOUS BLD VENIPUNCTURE: CPT | Performed by: FAMILY MEDICINE

## 2023-04-05 PROCEDURE — 99213 OFFICE O/P EST LOW 20 MIN: CPT | Mod: 25 | Performed by: FAMILY MEDICINE

## 2023-04-05 PROCEDURE — 82043 UR ALBUMIN QUANTITATIVE: CPT | Performed by: FAMILY MEDICINE

## 2023-04-05 RX ORDER — IRBESARTAN 300 MG/1
300 TABLET ORAL DAILY
Qty: 90 TABLET | Refills: 3 | Status: SHIPPED | OUTPATIENT
Start: 2023-04-05 | End: 2023-07-10

## 2023-04-05 RX ORDER — IRBESARTAN 300 MG/1
300 TABLET ORAL DAILY
Qty: 90 TABLET | Refills: 0 | Status: SHIPPED | OUTPATIENT
Start: 2023-04-05 | End: 2023-04-05

## 2023-04-05 ASSESSMENT — ENCOUNTER SYMPTOMS
ABDOMINAL PAIN: 0
HEARTBURN: 1
HEMATURIA: 0
BREAST MASS: 0
NAUSEA: 0
CHILLS: 0
EYE PAIN: 0
FEVER: 0
COUGH: 0
WEAKNESS: 0
CONSTIPATION: 0
HEMATOCHEZIA: 0
HEADACHES: 0
PALPITATIONS: 0
SHORTNESS OF BREATH: 0
FREQUENCY: 0
DIZZINESS: 0
DYSURIA: 0
MYALGIAS: 0
PARESTHESIAS: 0
NERVOUS/ANXIOUS: 0
ARTHRALGIAS: 1
DIARRHEA: 0
SORE THROAT: 0
JOINT SWELLING: 0

## 2023-04-05 NOTE — PROGRESS NOTES
SUBJECTIVE:   CC: Eugenia is an 53 year old who presents for preventive health visit.            View : No data to display.            Patient has been advised of split billing requirements and indicates understanding: Yes  Healthy Habits:     Getting at least 3 servings of Calcium per day:  Yes    Bi-annual eye exam:  Yes    Dental care twice a year:  Yes    Sleep apnea or symptoms of sleep apnea:  None    Diet:  Regular (no restrictions)    Frequency of exercise:  2-3 days/week    Duration of exercise:  30-45 minutes    Taking medications regularly:  Yes    Medication side effects:  None    PHQ-2 Total Score: 0    Additional concerns today:  No        Today's PHQ-2 Score:       4/4/2023     4:42 PM   PHQ-2 ( 1999 [employer])   Q1: Little interest or pleasure in doing things 0   Q2: Feeling down, depressed or hopeless 0   PHQ-2 Score 0   Q1: Little interest or pleasure in doing things Not at all   Q2: Feeling down, depressed or hopeless Not at all   PHQ-2 Score 0           Social History     Tobacco Use     Smoking status: Never     Smokeless tobacco: Never   Vaping Use     Vaping status: Never Used   Substance Use Topics     Alcohol use: Yes     Comment: Very rare             4/4/2023     4:41 PM   Alcohol Use   Prescreen: >3 drinks/day or >7 drinks/week? No     Reviewed orders with patient.  Reviewed health maintenance and updated orders accordingly - Yes  Labs reviewed in EPIC    Breast Cancer Screening:    FHS-7:       2/9/2022    12:44 PM 2/11/2022    12:43 PM 2/11/2022    12:46 PM 3/16/2023     7:28 AM   Breast CA Risk Assessment (FHS-7)   Did any of your first-degree relatives have breast or ovarian cancer? Yes No  No   Did any of your relatives have bilateral breast cancer? Unknown No  No   Did any man in your family have breast cancer? No No  No   Did any woman in your family have breast and ovarian cancer? No No  No   Did any woman in your family have breast cancer before age 50 y? Yes No Yes Yes   Do you  have 2 or more relatives with breast and/or ovarian cancer? Yes Yes  Yes   Do you have 2 or more relatives with breast and/or bowel cancer? Yes No  No       Mammogram Screening: Recommended annual mammography  Pertinent mammograms are reviewed under the imaging tab.    History of abnormal Pap smear: NO - age 30-65 PAP every 5 years with negative HPV co-testing recommended      Latest Ref Rng & Units 12/4/2019     3:38 PM 12/4/2019     2:20 PM 5/11/2016    10:05 AM   PAP / HPV   PAP (Historical)  NIL       HPV 16 DNA NEG^Negative  Negative   Negative     HPV 18 DNA NEG^Negative  Negative   Negative     Other HR HPV NEG^Negative  Negative   Negative       Reviewed and updated as needed this visit by clinical staff   Tobacco  Allergies  Meds              Reviewed and updated as needed this visit by Provider                 Past Medical History:   Diagnosis Date     Benign heart murmur     since childhood     Hypertension      Intermittent asthma      Migraines        Past Surgical History:   Procedure Laterality Date     D & C  1993    after miscarriage       MEDICATIONS:  Current Outpatient Medications   Medication     irbesartan (AVAPRO) 300 MG tablet     No current facility-administered medications for this visit.       SOCIAL HISTORY:  Social History     Tobacco Use     Smoking status: Never     Smokeless tobacco: Never   Vaping Use     Vaping status: Never Used   Substance Use Topics     Alcohol use: Yes     Comment: Very rare       Family History   Problem Relation Age of Onset     Cancer Mother         CLL--Chronic lymphatic luekemia     Diabetes Mother      Obesity Mother      C.A.D. Father         MI at  ~40yrs old     Melanoma Father         stage 4 - did trial and he is in remission     Diabetes Father      Cancer Maternal Grandmother         breast cancer     Breast Cancer Maternal Grandmother      Breast Cancer Maternal Aunt         3 aunts     Diabetes Sister      Hypertension Sister      Obesity  "Sister      Breast Cancer Other      Depression Sister      Anxiety Disorder Sister      Obesity Sister          Review of Systems   Constitutional: Negative for chills and fever.   HENT: Negative for congestion, ear pain, hearing loss and sore throat.    Eyes: Negative for pain and visual disturbance.   Respiratory: Negative for cough and shortness of breath.    Cardiovascular: Negative for chest pain, palpitations and peripheral edema.   Gastrointestinal: Positive for heartburn. Negative for abdominal pain, constipation, diarrhea, hematochezia and nausea.   Breasts:  Negative for tenderness, breast mass and discharge.   Genitourinary: Negative for dysuria, frequency, genital sores, hematuria, pelvic pain, urgency, vaginal bleeding and vaginal discharge.   Musculoskeletal: Positive for arthralgias. Negative for joint swelling and myalgias.   Skin: Negative for rash.   Neurological: Negative for dizziness, weakness, headaches and paresthesias.   Psychiatric/Behavioral: Negative for mood changes. The patient is not nervous/anxious.           OBJECTIVE:   /70 (BP Location: Right arm, Patient Position: Sitting, Cuff Size: Adult Large)   Pulse 104   Temp 97.7  F (36.5  C) (Oral)   Resp 14   Ht 1.676 m (5' 6\")   Wt 92.7 kg (204 lb 6.4 oz)   LMP 12/01/2019 (Exact Date)   SpO2 99%   BMI 32.99 kg/m    Physical Exam  GENERAL APPEARANCE: healthy, alert and no distress  EYES: Eyes grossly normal to inspection, PERRL and conjunctivae and sclerae normal  HENT: ear canals and TM's normal, nose and mouth without ulcers or lesions, oropharynx clear and oral mucous membranes moist  NECK: no adenopathy, no asymmetry, masses, or scars and thyroid normal to palpation  RESP: lungs clear to auscultation - no rales, rhonchi or wheezes  BREAST: normal without masses, tenderness or nipple discharge and no palpable axillary masses or adenopathy  CV: regular rate and rhythm, normal S1 S2, no S3 or S4, no murmur, click or rub, no " peripheral edema and peripheral pulses strong  ABDOMEN: soft, nontender, no hepatosplenomegaly, no masses and bowel sounds normal  MS: no musculoskeletal defects are noted and gait is age appropriate without ataxia  SKIN: no suspicious lesions or rashes  NEURO: Normal strength and tone, sensory exam grossly normal, mentation intact and speech normal  PSYCH: mentation appears normal and affect normal/bright    Diagnostic Test Results:  Labs reviewed in Epic    ASSESSMENT/PLAN:   (Z83.3) Family history of diabetes mellitus  (primary encounter diagnosis)  Comment: will check A1c       (Z12.11) Screen for colon cancer  Comment:   Plan: Colonoscopy Screening  Referral            (Z12.4) Cervical cancer screening  Comment: due next year      Z00.00) Routine general medical examination at a health care facility  Comment:   Plan: REVIEW OF HEALTH MAINTENANCE PROTOCOL ORDERS            (I10) Benign essential hypertension  Comment: under control  Plan: Albumin Random Urine Quantitative with Creat         Ratio, Basic metabolic panel  (Ca, Cl, CO2,         Creat, Gluc, K, Na, BUN), irbesartan (AVAPRO)         300 MG tablet, DISCONTINUED: irbesartan         (AVAPRO) 300 MG tablet        Continue  Refills per epicare     (Z83.49) Family history of thyroiditis  Comment:   Plan: Hemoglobin A1c, TSH with free T4 reflex            (J45.20) Mild intermittent asthma without complication  Comment:   Plan: Asthma Action Plan (AAP)              Patient has been advised of split billing requirements and indicates understanding: Yes      COUNSELING:  Reviewed preventive health counseling, as reflected in patient instructions  Special attention given to:        Regular exercise       Immunizations    Declined: Covid-19 and Zoster due to Concerns about side effects/safety               Osteoporosis prevention/bone health       Colorectal Cancer Screening        She reports that she has never smoked. She has never used smokeless  tobacco.      Bianca Nathan MD  [facility]

## 2023-04-05 NOTE — LETTER
My Asthma Action Plan    Name: Eugenia Santoyo   YOB: 1970  Date: 4/5/2023   My doctor: Bianca Nathan MD   My clinic: [facility]        My Rescue Medicine:   Albuterol inhaler (Proair/Ventolin/Proventil HFA)  2-4 puffs EVERY 4 HOURS as needed. Use a spacer if recommended by your provider.   My Asthma Severity:   Intermittent / Exercise Induced  Know your asthma triggers: exercise or sports             GREEN ZONE   Good Control  I feel good  No cough or wheeze  Can work, sleep and play without asthma symptoms       Take your asthma control medicine every day.     If exercise triggers your asthma, take your rescue medication  15 minutes before exercise or sports, and  During exercise if you have asthma symptoms  Spacer to use with inhaler: If you have a spacer, make sure to use it with your inhaler             YELLOW ZONE Getting Worse  I have ANY of these:  I do not feel good  Cough or wheeze  Chest feels tight  Wake up at night   Keep taking your Green Zone medications  Start taking your rescue medicine:  every 20 minutes for up to 1 hour. Then every 4 hours for 24-48 hours.  If you stay in the Yellow Zone for more than 12-24 hours, contact your doctor.  If you do not return to the Green Zone in 12-24 hours or you get worse, start taking your oral steroid medicine if prescribed by your provider.           RED ZONE Medical Alert - Get Help  I have ANY of these:  I feel awful  Medicine is not helping  Breathing getting harder  Trouble walking or talking  Nose opens wide to breathe       Take your rescue medicine NOW  If your provider has prescribed an oral steroid medicine, start taking it NOW  Call your doctor NOW  If you are still in the Red Zone after 20 minutes and you have not reached your doctor:  Take your rescue medicine again and  Call 911 or go to the emergency room right away    See your regular doctor within 2 weeks of an Emergency Room or Urgent Care visit for  follow-up treatment.          Annual Reminders:  Meet with Asthma Educator,  Flu Shot in the Fall, consider Pneumonia Vaccination for patients with asthma (aged 19 and older).    [id number] [place] [place] [facility] [address] [facility] [address] [facility] [address]    Electronically signed by Bianca Nathan MD   Date: 04/05/23                    Asthma Triggers  How To Control Things That Make Your Asthma Worse    Triggers are things that make your asthma worse.  Look at the list below to help you find your triggers and   what you can do about them. You can help prevent asthma flare-ups by staying away from your triggers.      Trigger                                                          What you can do   Cigarette Smoke  Tobacco smoke can make asthma worse. Do not allow smoking in your home, car or around you.  Be sure no one smokes at a child s day care or school.  If you smoke, ask your health care provider for ways to help you quit.  Ask family members to quit too.  Ask your health care provider for a referral to Quit Plan to help you quit smoking, or call 3-742-100-PLAN.     Colds, Flu, Bronchitis  These are common triggers of asthma. Wash your hands often.  Don t touch your eyes, nose or mouth.  Get a flu shot every year.     Dust Mites  These are tiny bugs that live in cloth or carpet. They are too small to see. Wash sheets and blankets in hot water every week.   Encase pillows and mattress in dust mite proof covers.  Avoid having carpet if you can. If you have carpet, vacuum weekly.   Use a dust mask and HEPA vacuum.   Pollen and Outdoor Mold  Some people are allergic to trees, grass, or weed pollen, or molds. Try to keep your windows closed.  Limit time out doors when pollen count is high.   Ask you health care provider about taking medicine during allergy season.     Animal Dander  Some people are allergic to skin flakes, urine or saliva from pets with fur or feathers. Keep pets with  fur or feathers out of your home.    If you can t keep the pet outdoors, then keep the pet out of your bedroom.  Keep the bedroom door closed.  Keep pets off cloth furniture and away from stuffed toys.     Mice, Rats, and Cockroaches  Some people are allergic to the waste from these pests.   Cover food and garbage.  Clean up spills and food crumbs.  Store grease in the refrigerator.   Keep food out of the bedroom.   Indoor Mold  This can be a trigger if your home has high moisture. Fix leaking faucets, pipes, or other sources of water.   Clean moldy surfaces.  Dehumidify basement if it is damp and smelly.   Smoke, Strong Odors, and Sprays  These can reduce air quality. Stay away from strong odors and sprays, such as perfume, powder, hair spray, paints, smoke incense, paint, cleaning products, candles and new carpet.   Exercise or Sports  Some people with asthma have this trigger. Be active!  Ask your doctor about taking medicine before sports or exercise to prevent symptoms.    Warm up for 5-10 minutes before and after sports or exercise.     Other Triggers of Asthma  Cold air:  Cover your nose and mouth with a scarf.  Sometimes laughing or crying can be a trigger.  Some medicines and food can trigger asthma.

## 2023-04-06 ENCOUNTER — TELEPHONE (OUTPATIENT)
Dept: FAMILY MEDICINE | Facility: CLINIC | Age: 53
End: 2023-04-06
Payer: COMMERCIAL

## 2023-04-06 NOTE — TELEPHONE ENCOUNTER
----- Message from Ana Laura Jackson RN sent at 4/6/2023  2:32 PM CDT -----    ----- Message -----  From: Bianca Nathan MD  Sent: 4/5/2023   5:23 PM CDT  To: Manfred Novoa3/5 Silver Pal (Rn)    Please call her and let her know the results and see if she has any questions    The A1c is showing that you are now in the diabetes range.   I will put in a referral to have diabetic education and have my nurse give you a call to see if you have any questions.    I will also send in a prescription for an oral medication to bring the sugars down.   I would like to see you in 3 months to recheck on this and further discuss   Written by Bianca Nathan MD on 4/5/2023  5:22 PM CDT  Seen by patient Eugenia Santoyo on 4/6/2023  8:42 AM

## 2023-04-06 NOTE — TELEPHONE ENCOUNTER
Called patient and left voicemail to call back and ask to speak to any triage nurse.    Nelly Vilchis RN

## 2023-04-07 ENCOUNTER — PATIENT OUTREACH (OUTPATIENT)
Dept: FAMILY MEDICINE | Facility: CLINIC | Age: 53
End: 2023-04-07
Payer: COMMERCIAL

## 2023-04-07 NOTE — TELEPHONE ENCOUNTER
PCT welcome letter sent, added to care team.     Niki CARSON RN, BSN, PHN - PAL (patient advocate liaison)  [facility]  [phone]

## 2023-04-07 NOTE — LETTER
Eugenia Santoyo  [address] [address] [facility] [address]  9  [facility] is transforming primary care  At [facility], we re dedicated to constantly improve how we serve the health care needs of our patients and communities. We re currently making changes to the way we deliver care.     Changes you ll notice include:  An emphasis on building a relationship with a primary care provider  Access to a PAL (patient advocate and liaison) to help guide you with your care needs  Appointment lengths tailored to your specific needs and greater access to a care team to help you and your provider improve and maintain your health and well-being  Improved online access to your care team    Benefits of a primary care provider  If you don t have a designated primary care provider, we encourage you to get to know our care team online and find a provider you d like to see. Most of our providers have a short video on their online provider page. Visit [email or web address] to explore our providers and locations.    Benefits of having a primary care provider include:    They get to know you - your health history, family history and goals, making it easier to make a health plan together.   You get to know them - making health-related conversations and decisions easier    Primary care doctors help you when you re sick or hurt - but also focus on keeping you healthy with preventive care and screenings.    A doctor who sees you regularly is more likely to notice changes in your health.   You ll be connected to a broad care team who partners with your provider to support you.    Patient Advocate Liaison (PAL)   To help make sure you get the right care, at the right time, we include PALs, or Patient Advocate Liaisons, as part of your care team. Your PAL will be your first line of contact. They ll advocate for your needs and help you navigate our services, connecting you with care team members and community resources to ensure your care is  well coordinated. You ll be introduced to a PAL in an upcoming visit.     Expanded care team access with tailored appointment lengths  Depending on your health care needs, you may have longer or shorter appointments and see additional care team providers - including Medication Therapy Management (MTM) pharmacists, diabetes educators, behavioral health clinicians, or social workers. At times, they may be included in your visit with your provider, or you may see them individually.     Online access to your health care records and care team  [employer] is our online tool that makes it easy to see your health care information and communicate with your care team.     [employer] allows you to:   View your health maintenance plan so you know when you re due for a preventive screening  Send secure messages to your care team  View your health history and visit summaries   Schedule appointments   Complete questionnaires and eCheck-in before appointments    Get care from your provider with an e-visit    View and pay your bill     Sign up at [employer]. Once you have an account, you also can download the mobile daniel.     Connecting to fast and convenient care  When you need fast, convenient care - consider one of the following options:     Video Visit: A convenient care option for visiting with your provider out of the comfort of your own home. Most of the things you come to the clinic to address with your provider can now be done virtually through a video. This includes your chronic medication follow up, questions or concerns you may have, and even your annual Medicare Wellness Visit.     Phone Visit: Another convenient option for follow up of common problems that may require a more in-depth discussion with your provider.     E-visit: When you need acute care quickly, or have a quick question about your medication, an E-visit is completed through [employer] and your provider will respond within one business day.      Niki CARSON  RN, BSN, PHN - PAL (patient advocate liaison)  [facility]  [phone]

## 2023-04-07 NOTE — TELEPHONE ENCOUNTER
Called pt and relayed provider message below. Patient was given an opportunity to ask questions, verbalized understanding of plan, and is agreeable.    Provided referral information for diabetic educator (see below)  If you have not heard from the scheduling office within 2 business days, please call [phone] for [facility], [phone] for the [facility] or [phone] for [facility].    Scheduled pt for diabetic recheck with Bianca Nathan MD on 7/10/23    Nelly Vilchis RN

## 2023-04-12 ENCOUNTER — TELEPHONE (OUTPATIENT)
Dept: FAMILY MEDICINE | Facility: CLINIC | Age: 53
End: 2023-04-12

## 2023-04-12 NOTE — TELEPHONE ENCOUNTER
Diabetes Education Scheduling Outreach #1:    Call to patient to schedule. Left message with phone number to call to schedule.    Plan for 2nd outreach attempt within 3 business days.    [facility]  Diabetes and Nutrition Scheduling

## 2023-04-17 NOTE — TELEPHONE ENCOUNTER
Diabetes Education Scheduling Outreach #2:    Call to patient to schedule. Left message with phone number to call to schedule.      Tatum Lara  [place] [employer] [facility]  Diabetes and Nutrition Scheduling

## 2023-05-03 ENCOUNTER — VIRTUAL VISIT (OUTPATIENT)
Dept: EDUCATION SERVICES | Facility: CLINIC | Age: 53
End: 2023-05-03
Payer: COMMERCIAL

## 2023-05-03 DIAGNOSIS — E11.9 DIABETES MELLITUS, TYPE 2 (H): Primary | ICD-10-CM

## 2023-05-03 PROCEDURE — G0108 DIAB MANAGE TRN  PER INDIV: HCPCS | Mod: VID | Performed by: DIETITIAN, REGISTERED

## 2023-05-03 RX ORDER — GLUCOSAMINE HCL/CHONDROITIN SU 500-400 MG
CAPSULE ORAL
Qty: 100 EACH | Refills: 3 | Status: SHIPPED | OUTPATIENT
Start: 2023-05-03

## 2023-05-03 RX ORDER — LANCETS
EACH MISCELLANEOUS
Qty: 100 EACH | Refills: 6 | Status: SHIPPED | OUTPATIENT
Start: 2023-05-03 | End: 2024-08-10

## 2023-05-03 NOTE — PATIENT INSTRUCTIONS
Care Plan:  Meal Plan Recommendation: eat 3 meals a day, have small snacks between meals, if needed, and use portion control. Aim for up to 30- 45 gms of carbohydrate per meal, and up to 15 gms per snack.    Check blood sugars: 1-3 times per week, fasting or 2 hours after the start of meals.  (A1c target: less than 7%, Blood sugar target before meals:  mg/dl, 2 hrs after the start of a meal: less than 180 mg/dl)    Medication:  continue current dose of Metformin    Exercise / activity plan: aim for 30 min walking 5x/ week      Diabetes Support Resources:  Websites: American Diabetes Association: www.diabetes.org    Follow up:  Please call, e-mail,  or send [employer] message with questions, concerns or if follow-up is needed.    Ashley Chiu RD, LD, [facility]  Diabetes     [facility] Diabetes Education and Nutrition Services for the [facility]:    For Your Diabetes or Nutrition Education Appointments   Call: [phone]   For Diabetes or Nutrition Related Questions   Call: [phone]

## 2023-05-03 NOTE — PROGRESS NOTES
Diabetes Self-Management Education & Support    Presents for: Initial Assessment for new diagnosis    Type of service:  Video Visit    If the video visit is dropped, the video visit invitation should be resent by: Send to e-mail at: [email or web address]    Originating Location (pt. Location): Home  Distant Location (provider location): Offsite  Mode of Communication:  Video Conference via [employer]    Video Start Time: 1:00  Video End Time (time video stopped): 1:45    How would patient like to obtain AVS? MyChart      ASSESSMENT:    Initial diabetes education completed, answered various nutrition related questions. Also discussed wt loss, exercise, and BG monitoring. Testing supplies ordered. Has started Metformin, no side effects thus far. Pt is receptive and has already started making changes to improve improve diet.     Patient's most recent   Lab Results   Component Value Date    A1C 6.8 04/05/2023    A1C 5.8 01/20/2021     is meeting goal of <7.0    Diabetes knowledge and skills assessment:   Patient is knowledgeable in diabetes management concepts related to: new dx    Continue education with the following diabetes management concepts: Healthy Eating, Being Active, Monitoring and Healthy Coping    Based on learning assessment above, most appropriate setting for further diabetes education would be: Individual setting.      PLAN    Follow-up: annually, or sooner as needed.     See Care Plan for co-developed, patient-state behavior change goals.  AVS provided for patient today.    Education Materials Provided:  No new materials provided today      SUBJECTIVE/OBJECTIVE:  Presents for: Initial Assessment for new diagnosis  Accompanied by: Self  Diabetes education in the past 24mo: No  Focus of Visit: Healthy Eating  Diabetes type: Type 2  Date of diagnosis: April 2023  Disease course: Other  How confident are you filling out medical forms by yourself:: Extremely  Diabetes management related comments/concerns:  "I've been doing alot reseach on my own  Transportation concerns: No  Other concerns:: Glasses  Cultural Influences/Ethnic Background:  Choose not to answer    Diabetes Symptoms & Complications:  Fatigue: Sometimes  Polydipsia: No  Polyphagia: No  Polyuria: No  Visual change: No  Slow healing wounds: No  Symptom course: Stable  Weight trend: Increasing  Complications assessed today?: No    Patient Problem List and Family Medical History reviewed for relevant medical history, current medical status, and diabetes risk factors.    Vitals:  LMP 12/01/2019 (Exact Date)   Estimated body mass index is 32.99 kg/m  as calculated from the following:    Height as of 4/5/23: 1.676 m (5' 6\").    Weight as of 4/5/23: 92.7 kg (204 lb 6.4 oz).   Last 3 BP:   BP Readings from Last 3 Encounters:   04/05/23 126/70   05/05/22 128/84   02/09/22 (!) 144/86       History   Smoking Status     Never   Smokeless Tobacco     Never       Labs:  Lab Results   Component Value Date    A1C 6.8 04/05/2023    A1C 5.8 01/20/2021     Lab Results   Component Value Date     04/05/2023     02/09/2022     01/20/2021     Lab Results   Component Value Date     02/09/2022     01/20/2021     HDL Cholesterol   Date Value Ref Range Status   01/20/2021 71 >49 mg/dL Final     Direct Measure HDL   Date Value Ref Range Status   02/09/2022 64 >=50 mg/dL Final   ]  GFR Estimate   Date Value Ref Range Status   04/05/2023 >90 >60 mL/min/1.73m2 Final     Comment:     eGFR calculated using 2021 CKD-EPI equation.   01/20/2021 >90 >60 mL/min/[1.73_m2] Final     Comment:     Non  GFR Calc  Starting 12/18/2018, serum creatinine based estimated GFR (eGFR) will be   calculated using the Chronic Kidney Disease Epidemiology Collaboration   (CKD-EPI) equation.       GFR Estimate If Black   Date Value Ref Range Status   01/20/2021 >90 >60 mL/min/[1.73_m2] Final     Comment:      GFR Calc  Starting 12/18/2018, serum " creatinine based estimated GFR (eGFR) will be   calculated using the Chronic Kidney Disease Epidemiology Collaboration   (CKD-EPI) equation.       Lab Results   Component Value Date    CR 0.73 04/05/2023    CR 0.68 01/20/2021     No results found for: MICROALBUMIN    Healthy Eating:  Healthy Eating Assessed Today: Yes  Meal planning/habits: Low carb, Other  How many times a week on average do you eat food made away from home (restaurant/take-out)?: 1  Meals include: Breakfast, Lunch, Dinner  Breakfast: cottage cheese, blueberries  Lunch: salad, nuts  Dinner: broccoli salad, grilled chicken or steak or hamburger  Beverages: Water  Has patient met with a dietitian in the past?: No    Being Active:  Being Active Assessed Today: Yes  Exercise:: Currently not exercising  Barrier to exercise: Other    Monitoring:  Monitoring Assessed Today: Yes  Did patient bring glucose meter to appointment? : No  Times checking blood sugar at home (number): Never    Taking Medications:  Diabetes Medication(s)     Biguanides       metFORMIN (GLUCOPHAGE) 500 MG tablet    Take 1 tablet (500 mg) by mouth 2 times daily (with meals)        Taking Medication Assessed Today: Yes  Current Treatments: Oral Medication (taken by mouth)  Problems taking diabetes medications regularly?: No  Diabetes medication side effects?: No    Problem Solving:  Problem Solving Assessed Today: Yes  Is the patient at risk for hypoglycemia?: No    Reducing Risks:  Reducing Risks Assessed Today: Yes  Diabetes Risks: Age over 45 years, Sedentary Lifestyle, Family History    Healthy Coping:  Healthy Coping Assessed Today: Yes  Emotional response to diabetes: Ready to learn, Confidence diabetes can be controlled  Stage of change: PREPARATION (Decided to change - considering how)  Support resources: Websites  Patient Activation Measure Survey Score:      12/4/2019     1:18 PM 4/4/2023     4:48 PM   LUIS Score (Last Two)   LUIS Raw Score 39 34   Activation Score 90.2 68.9    LUIS Level 4 3       Care Plan and Education Provided:  Care Plan: Diabetes   Updates made by Ashley Chiu RD since 5/3/2023 12:00 AM      Problem: HbA1C Not In Goal       Goal: Establish Regular Follow-Ups with PCP       Task: Discuss with PCP the recommended timing for patient's next follow up visit(s)    Responsible User: Ashley Chiu RD      Task: Discuss schedule for PCP visits with patient    Responsible User: Ashley Chiu RD      Goal: Get HbA1C Level in Goal       Task: Educate patient on diabetes education self-management topics    Responsible User: Ashley Chiu RD      Task: Educate patient on benefits of regular glucose monitoring    Responsible User: Ashley Chiu RD      Task: Refer patient to appropriate extended care team member, as needed (Medication Therapy Management, Behavioral Health, Physical Therapy, etc.)    Responsible User: Ashley Chiu RD      Task: Discuss diabetes treatment plan with patient    Responsible User: Ashley Chiu RD      Problem: Diabetes Self-Management Education Needed to Optimize Self-Care Behaviors       Goal: Understand diabetes pathophysiology and disease progression       Task: Provide education on diabetes pathophysiology and disease progression specfic to patient's diabetes type Completed 5/3/2023   Responsible User: Ashley Chiu RD      Goal: Healthy Eating - follow a healthy eating pattern for diabetes       Task: Provide education on portion control and consistency in amount, composition and timing of food intake Completed 5/3/2023   Responsible User: Ashley Chiu RD      Task: Provide education on managing carbohydrate intake (carbohydrate counting, plate planning method, etc.) Completed 5/3/2023   Responsible User: Ashley Chiu RD      Task: Provide education on weight management Completed 5/3/2023   Responsible User: Ashley Chiu RD      Task: Provide education on heart healthy eating    Responsible User: Ashley Chiu  [place]      Task: Provide education on eating out    Responsible User: Ashley Chiu RD      Task: Develop individualized healthy eating plan with patient    Responsible User: Ashley Chiu RD      Goal: Being Active - get regular physical activity, working up to at least 150 minutes per week       Task: Provide education on relationship of activity to glucose and precautions to take if at risk for low glucose Completed 5/3/2023   Responsible User: Ashley Chiu RD      Task: Discuss barriers to physical activity with patient    Responsible User: Ashley Chiu RD      Task: Develop physical activity plan with patient    Responsible User: Ashley Chiu RD      Task: Explore community resources including walking groups, assistance programs, and home videos    Responsible User: Ashley Chiu RD      Goal: Monitoring - monitor glucose and ketones as directed       Task: Provide education on blood glucose monitoring (purpose, proper technique, frequency, glucose targets, interpreting results, when to use glucose control solution, sharps disposal) Completed 5/3/2023   Responsible User: Ashley Chiu RD      Task: Provide education on continuous glucose monitoring (sensor placement, use of daniel or /reader, understanding glucose trends, alerts and alarms, differences between sensor glucose and blood glucose)    Responsible User: Ashley Chiu RD      Task: Provide education on ketone monitoring (when to monitor, frequency, etc.)    Responsible User: Ashley Chiu RD      Goal: Taking Medication - patient is consistently taking medications as directed       Task: Provide education on action of prescribed medication, including when to take and possible side effects    Responsible User: Ashley Chiu RD      Task: Provide education on insulin and injectable diabetes medications, including administration, storage, site selection and rotation for injection sites    Responsible User: Aram  Ashley HILL RD      Task: Discuss barriers to medication adherence with patient and provide management technique ideas as appropriate    Responsible User: Ashley Chiu RD      Task: Provide education on frequency and refill details of medications    Responsible User: Ashley Chiu RD      Goal: Problem Solving - know how to prevent and manage short-term diabetes complications       Task: Provide education on high blood glucose - causes, signs/symptoms, prevention and treatment    Responsible User: Ashley Chiu RD      Task: Provide education on low blood glucose - causes, signs/symptoms, prevention, treatment, carrying a carbohydrate source at all times, and medical identification    Responsible User: sAhley Chiu RD      Task: Provide education on safe travel with diabetes    Responsible User: Ashley Chiu RD      Task: Provide education on how to care for diabetes on sick days    Responsible User: Ashley Chiu RD      Task: Provide education on when to call a health care provider    Responsible User: Ashley Chiu RD      Goal: Reducing Risks - know how to prevent and treat long-term diabetes complications       Task: Provide education on major complications of diabetes, prevention, early diagnostic measures and treatment of complications    Responsible User: Ashley Chiu RD      Task: Provide education on recommended care for dental, eye and foot health    Responsible User: Ashley Chiu RD      Task: Provide education on Hemoglobin A1c - goals and relationship to blood glucose levels Completed 5/3/2023   Responsible User: Ashley Chiu RD      Task: Provide education on recommendations for heart health - lipid levels and goals, blood pressure and goals, and aspirin therapy, if indicated    Responsible User: Ashley Chiu RD      Task: Provide education on tobacco cessation    Responsible User: Ashley Chiu RD      Goal: Healthy Coping - use available resources to cope with  the challenges of managing diabetes       Task: Discuss recognizing feelings about having diabetes    Responsible User: Ashley Chiu RD      Task: Provide education on the benefits of making appropriate lifestyle changes Completed 5/3/2023   Responsible User: Ashley Chiu RD      Task: Provide education on benefits of utilizing support systems    Responsible User: Ashley Chiu RD      Task: Discuss methods for coping with stress    Responsible User: Ashley Chiu RD      Task: Provide education on when to seek professional counseling    Responsible User: Ashley Chiu RD Mary Spencer RD, [facility]  Diabetes       Time Spent: 45 minutes  Encounter Type: Individual    Any diabetes medication dose changes were made via the CDE Protocol per the patient's primary care provider. A copy of this encounter was shared with the provider.

## 2023-07-10 ENCOUNTER — OFFICE VISIT (OUTPATIENT)
Dept: FAMILY MEDICINE | Facility: CLINIC | Age: 53
End: 2023-07-10
Payer: COMMERCIAL

## 2023-07-10 VITALS
SYSTOLIC BLOOD PRESSURE: 138 MMHG | WEIGHT: 203 LBS | HEART RATE: 74 BPM | OXYGEN SATURATION: 97 % | RESPIRATION RATE: 16 BRPM | TEMPERATURE: 98.1 F | HEIGHT: 66 IN | DIASTOLIC BLOOD PRESSURE: 82 MMHG | BODY MASS INDEX: 32.62 KG/M2

## 2023-07-10 DIAGNOSIS — E11.9 TYPE 2 DIABETES MELLITUS WITHOUT COMPLICATION, WITHOUT LONG-TERM CURRENT USE OF INSULIN (H): ICD-10-CM

## 2023-07-10 DIAGNOSIS — I10 BENIGN ESSENTIAL HYPERTENSION: ICD-10-CM

## 2023-07-10 DIAGNOSIS — Z12.11 SCREEN FOR COLON CANCER: ICD-10-CM

## 2023-07-10 DIAGNOSIS — E66.811 CLASS 1 OBESITY DUE TO EXCESS CALORIES WITH SERIOUS COMORBIDITY AND BODY MASS INDEX (BMI) OF 32.0 TO 32.9 IN ADULT: Primary | ICD-10-CM

## 2023-07-10 DIAGNOSIS — E66.09 CLASS 1 OBESITY DUE TO EXCESS CALORIES WITH SERIOUS COMORBIDITY AND BODY MASS INDEX (BMI) OF 32.0 TO 32.9 IN ADULT: Primary | ICD-10-CM

## 2023-07-10 LAB
ALT SERPL W P-5'-P-CCNC: 22 U/L (ref 0–50)
CHOLEST SERPL-MCNC: 228 MG/DL
HBA1C MFR BLD: 6.4 % (ref 0–5.6)
HDLC SERPL-MCNC: 60 MG/DL
LDLC SERPL CALC-MCNC: 145 MG/DL
NONHDLC SERPL-MCNC: 168 MG/DL
TRIGL SERPL-MCNC: 117 MG/DL

## 2023-07-10 PROCEDURE — 80061 LIPID PANEL: CPT | Performed by: FAMILY MEDICINE

## 2023-07-10 PROCEDURE — 99207 PR FOOT EXAM NO CHARGE: CPT | Performed by: FAMILY MEDICINE

## 2023-07-10 PROCEDURE — 83036 HEMOGLOBIN GLYCOSYLATED A1C: CPT | Performed by: FAMILY MEDICINE

## 2023-07-10 PROCEDURE — 99214 OFFICE O/P EST MOD 30 MIN: CPT | Performed by: FAMILY MEDICINE

## 2023-07-10 PROCEDURE — 36415 COLL VENOUS BLD VENIPUNCTURE: CPT | Performed by: FAMILY MEDICINE

## 2023-07-10 PROCEDURE — 84460 ALANINE AMINO (ALT) (SGPT): CPT | Performed by: FAMILY MEDICINE

## 2023-07-10 RX ORDER — TIRZEPATIDE 2.5 MG/.5ML
2.5 INJECTION, SOLUTION SUBCUTANEOUS
Qty: 4 ML | Refills: 0 | Status: SHIPPED | OUTPATIENT
Start: 2023-07-10 | End: 2023-08-02

## 2023-07-10 RX ORDER — IRBESARTAN 300 MG/1
300 TABLET ORAL DAILY
Qty: 90 TABLET | Refills: 3 | Status: SHIPPED | OUTPATIENT
Start: 2023-07-10 | End: 2024-07-08

## 2023-07-10 NOTE — PROGRESS NOTES
"  Assessment & Plan     Benign essential hypertension  Under control  continue  - irbesartan (AVAPRO) 300 MG tablet  Dispense: 90 tablet; Refill: 3    Type 2 diabetes mellitus without complication, without long-term current use of insulin (H)  Under fair control   Will add mounjaro for weight control and better sugar control  The potential side effects of the prescription medication were discussed with the patient.    - Adult Eye  Referral  - ALT  - Lipid panel reflex to direct LDL Non-fasting  - FOOT EXAM  - Hemoglobin A1c  - tirzepatide (MOUNJARO) 2.5 MG/0.5ML pen  Dispense: 4 mL; Refill: 0  - Hemoglobin A1c  - ALT  - Lipid panel reflex to direct LDL Non-fasting    Screen for colon cancer    - Colonoscopy Screening  Referral    Class 1 obesity due to excess calories with serious comorbidity and body mass index (BMI) of 32.0 to 32.9 in adult  Hopefully the mounjaro will aid with weight loss        25 minutes spent by me on the date of the encounter doing chart review, history and exam, documentation and further activities per the note       BMI:   Estimated body mass index is 32.77 kg/m  as calculated from the following:    Height as of this encounter: 1.676 m (5' 6\").    Weight as of this encounter: 92.1 kg (203 lb).   Weight management plan: Discussed healthy diet and exercise guidelines    See Patient Instructions    Bianca Nathan MD  [facility]    Ministerio Bello is a 53 year old, presenting for the following health issues:  Diabetes  she is doing well on her metformin but feels her sugars are all over the place.   Fasting is usually 130-140 and sometimes her highs are still close to 200.    Her sister is on mounjaro and now has very even sugars.   She is wondering about this.             7/10/2023     8:37 AM   Additional Questions   Roomed by keira mcintyre   Accompanied by self     History of Present Illness       Diabetes:   She presents for follow up of diabetes.  " She is checking home blood glucose a few times a week. She checks blood glucose after meals.  Blood glucose is never over 200 and sometimes under 70. When her blood glucose is low, the patient is asymptomatic for confusion, blurred vision, lethargy and reports not feeling dizzy, shaky, or weak.  She has no concerns regarding her diabetes at this time.  She is not experiencing numbness or burning in feet, excessive thirst, blurry vision, weight changes or redness, sores or blisters on feet. The patient has not had a diabetic eye exam in the last 12 months.         She eats 4 or more servings of fruits and vegetables daily.She consumes 0 sweetened beverage(s) daily.She exercises with enough effort to increase her heart rate 30 to 60 minutes per day.  She exercises with enough effort to increase her heart rate 4 days per week.   She is taking medications regularly.       Diabetes Follow-up    How often are you checking your blood sugar? A few times a week  What time of day are you checking your blood sugars (select all that apply)?  Before and after meals  Have you had any blood sugars above 200?  No  Have you had any blood sugars below 70?  No    What symptoms do you notice when your blood sugar is low?  None and Not applicable    What concerns do you have today about your diabetes? None and Other: blood sugars all over the place      Do you have any of these symptoms? (Select all that apply)no   Weight gain    Have you had a diabetic eye exam in the last 12 months? No but I did have an eye exam         BP Readings from Last 2 Encounters:   07/10/23 138/82   04/05/23 126/70     Hemoglobin A1C (%)   Date Value   04/05/2023 6.8 (H)   02/09/2022 5.4   01/20/2021 5.8 (H)   12/04/2019 5.0     LDL Cholesterol Calculated (mg/dL)   Date Value   02/09/2022 121 (H)   01/20/2021 138 (H)   12/04/2019 128 (H)           Past Medical History:   Diagnosis Date     Benign heart murmur     since childhood     Diabetes mellitus, type 2  "(H) 4/5/2023     Hypertension      Intermittent asthma      Migraines        Past Surgical History:   Procedure Laterality Date     D & C  1993    after miscarriage       MEDICATIONS:  Current Outpatient Medications   Medication     alcohol swab prep pads     blood glucose (NO BRAND SPECIFIED) test strip     blood glucose monitoring (NO BRAND SPECIFIED) meter device kit     irbesartan (AVAPRO) 300 MG tablet     metFORMIN (GLUCOPHAGE) 500 MG tablet     thin (NO BRAND SPECIFIED) lancets     tirzepatide (MOUNJARO) 2.5 MG/0.5ML pen     No current facility-administered medications for this visit.       SOCIAL HISTORY:  Social History     Tobacco Use     Smoking status: Never     Smokeless tobacco: Never   Substance Use Topics     Alcohol use: Yes     Comment: Very rare       Family History   Problem Relation Age of Onset     Cancer Mother         CLL--Chronic lymphatic luekemia     Diabetes Mother      Obesity Mother      C.A.D. Father         MI at  ~40yrs old     Melanoma Father         stage 4 - did trial and he is in remission     Diabetes Father      Cancer Maternal Grandmother         breast cancer     Breast Cancer Maternal Grandmother      Breast Cancer Maternal Aunt         3 aunts     Diabetes Sister      Hypertension Sister      Obesity Sister      Breast Cancer Other      Depression Sister      Anxiety Disorder Sister      Obesity Sister              Review of Systems   Constitutional, HEENT, cardiovascular, pulmonary, gi and gu systems are negative, except as otherwise noted.      Objective    /82   Pulse 74   Temp 98.1  F (36.7  C) (Oral)   Resp 16   Ht 1.676 m (5' 6\")   Wt 92.1 kg (203 lb)   LMP 12/01/2019 (Exact Date)   SpO2 97%   BMI 32.77 kg/m    Body mass index is 32.77 kg/m .  Physical Exam   GENERAL: healthy, alert, no distress and obese  EYES: Eyes grossly normal to inspection, PERRL and conjunctivae and sclerae normal  NECK: no adenopathy, no asymmetry, masses, or scars and thyroid " normal to palpation  RESP: lungs clear to auscultation - no rales, rhonchi or wheezes  CV: regular rate and rhythm, normal S1 S2, no S3 or S4, no murmur, click or rub, no peripheral edema and peripheral pulses strong  MS: no gross musculoskeletal defects noted, no edema  SKIN: no suspicious lesions or rashes  NEURO: Normal strength and tone, mentation intact and speech normal  PSYCH: mentation appears normal, affect normal/bright  Diabetic foot exam: normal DP and PT pulses, no trophic changes or ulcerative lesions, normal sensory exam and normal monofilament exam    Office Visit on 04/05/2023   Component Date Value Ref Range Status     Creatinine Urine mg/dL 04/05/2023 203.0  mg/dL Final    The reference ranges have not been established in urine creatinine. The results should be integrated into the clinical context for interpretation.     Albumin Urine mg/L 04/05/2023 74.3  mg/L Final    The reference ranges have not been established in urine albumin. The results should be integrated into the clinical context for interpretation.     Albumin Urine mg/g Cr 04/05/2023 36.60 (H)  0.00 - 25.00 mg/g Cr Final    Microalbuminuria is defined as an albumin:creatinine ratio of 17 to 299 for males and 25 to 299 for females. A ratio of albumin:creatinine of 300 or higher is indicative of overt proteinuria.  Due to biologic variability, positive results should be confirmed by a second, first-morning random or 24-hour timed urine specimen. If there is discrepancy, a third specimen is recommended. When 2 out of 3 results are in the microalbuminuria range, this is evidence for incipient nephropathy and warrants increased efforts at glucose control, blood pressure control, and institution of therapy with an angiotensin-converting-enzyme (ACE) inhibitor (if the patient can tolerate it).       Sodium 04/05/2023 138  136 - 145 mmol/L Final     Potassium 04/05/2023 3.7  3.4 - 5.3 mmol/L Final     Chloride 04/05/2023 100  98 - 107 mmol/L  Final     Carbon Dioxide (CO2) 04/05/2023 21 (L)  22 - 29 mmol/L Final     Anion Gap 04/05/2023 17 (H)  7 - 15 mmol/L Final     Urea Nitrogen 04/05/2023 14.8  6.0 - 20.0 mg/dL Final     Creatinine 04/05/2023 0.73  0.51 - 0.95 mg/dL Final     Calcium 04/05/2023 10.1 (H)  8.6 - 10.0 mg/dL Final     Glucose 04/05/2023 247 (H)  70 - 99 mg/dL Final     GFR Estimate 04/05/2023 >90  >60 mL/min/1.73m2 Final    eGFR calculated using 2021 CKD-EPI equation.     Hemoglobin A1C 04/05/2023 6.8 (H)  0.0 - 5.6 % Final    Normal <5.7%   Prediabetes 5.7-6.4%    Diabetes 6.5% or higher     Note: Adopted from ADA consensus guidelines.     TSH 04/05/2023 1.02  0.30 - 4.20 uIU/mL Final

## 2023-07-20 ENCOUNTER — TELEPHONE (OUTPATIENT)
Dept: FAMILY MEDICINE | Facility: CLINIC | Age: 53
End: 2023-07-20
Payer: COMMERCIAL

## 2023-07-20 NOTE — TELEPHONE ENCOUNTER
Patient Quality Outreach    Patient is due for the following:   Diabetes -  Eye Exam    Next Steps:   No follow up needed at this time.    Type of outreach:    Chart review performed, no outreach needed. and Pt had a diabetic eye exam at [facility] on 11/18/22      Questions for provider review:    None           Huyen Smith, [facility]

## 2023-08-02 ENCOUNTER — MYC MEDICAL ADVICE (OUTPATIENT)
Dept: FAMILY MEDICINE | Facility: CLINIC | Age: 53
End: 2023-08-02
Payer: COMMERCIAL

## 2023-08-02 ENCOUNTER — MYC REFILL (OUTPATIENT)
Dept: FAMILY MEDICINE | Facility: CLINIC | Age: 53
End: 2023-08-02
Payer: COMMERCIAL

## 2023-08-02 DIAGNOSIS — E66.09 CLASS 1 OBESITY DUE TO EXCESS CALORIES WITH SERIOUS COMORBIDITY AND BODY MASS INDEX (BMI) OF 32.0 TO 32.9 IN ADULT: ICD-10-CM

## 2023-08-02 DIAGNOSIS — E66.811 CLASS 1 OBESITY DUE TO EXCESS CALORIES WITH SERIOUS COMORBIDITY AND BODY MASS INDEX (BMI) OF 32.0 TO 32.9 IN ADULT: ICD-10-CM

## 2023-08-02 DIAGNOSIS — E11.9 TYPE 2 DIABETES MELLITUS WITHOUT COMPLICATION, WITHOUT LONG-TERM CURRENT USE OF INSULIN (H): Primary | ICD-10-CM

## 2023-08-02 DIAGNOSIS — E11.9 TYPE 2 DIABETES MELLITUS WITHOUT COMPLICATION, WITHOUT LONG-TERM CURRENT USE OF INSULIN (H): ICD-10-CM

## 2023-08-02 NOTE — TELEPHONE ENCOUNTER
Dr. Simon,   Please see my chart, requesting increase dose for mounjaro (patient notified that Dr. Simon out of office until 8/14/2023 and may need visit to discuss increasing dose)     See my chart, awaiting patient response regarding dexcom prior to sending request to pcp - advised to check with insurance to see if covered prior to sending and then let us know insurance recommendations     Ana Laura Jackson, Registered Nurse, PAL (Patient Advocate Liaison)   [facility]   [phone]

## 2023-08-02 NOTE — TELEPHONE ENCOUNTER
Routing refill request to provider for review/approval because:  Drug not on the [facility] refill protocol     Gurwinder Diaz RN on 8/2/2023 at 4:08 PM

## 2023-08-02 NOTE — TELEPHONE ENCOUNTER
Dr. Simon,     Please review upon return to clinic     Thank you   Ana Laura Jackson, Registered Nurse, PAL (Patient Advocate Liaison)   [facility]   [phone]

## 2023-08-02 NOTE — TELEPHONE ENCOUNTER
Dr. Simon/covering providers     See my chart message     Request for dexcom (RN does not know how to pend this)   Request to increase mounjaro, patient states she was advised to just message to get this increased     Awaiting patient response to my chart to advise which pharmacy she would like requested orders sent to prior to sending to provider     Ana Laura Jackson, Registered Nurse, PAL (Patient Advocate Liaison)   [facility]   [phone]

## 2023-08-03 ENCOUNTER — TELEPHONE (OUTPATIENT)
Dept: FAMILY MEDICINE | Facility: CLINIC | Age: 53
End: 2023-08-03
Payer: COMMERCIAL

## 2023-08-03 RX ORDER — TIRZEPATIDE 2.5 MG/.5ML
2.5 INJECTION, SOLUTION SUBCUTANEOUS
Qty: 4 ML | Refills: 0 | Status: SHIPPED | OUTPATIENT
Start: 2023-08-03 | End: 2023-08-14 | Stop reason: DRUGHIGH

## 2023-08-03 NOTE — TELEPHONE ENCOUNTER
Prior Auth Needed:     Disp Refills Start End TERESA   tirzepatide (MOUNJARO) 2.5 MG/0.5ML pen 4 mL 0 8/3/2023  No   Sig - Route: Inject 2.5 mg Subcutaneous every 7 days - Subcutaneous   Sent to pharmacy as: Mounjaro 2.5 MG/0.5ML Subcutaneous Solution Pen-injector (tirzepatide)   Class: E-Prescribe   Order: [id number]       [place]: [id number]  Patient last name: Piller  : 1970

## 2023-08-08 NOTE — TELEPHONE ENCOUNTER
Central Prior Authorization Team   Phone: [phone]    PA Initiation    Medication: Mounjaro 2.5mg/0.5ml Pen-Injectors  Insurance Company: [employer] [place] - Phone [phone] Fax [phone]  Pharmacy Filling the Rx: [id number] - [place], [place] - [address] AT [facility] [id number]  Filling Pharmacy Phone: [phone]  Filling Pharmacy Fax:    Start Date: 8/8/2023

## 2023-08-08 NOTE — TELEPHONE ENCOUNTER
Prior Authorization Not Needed per Insurance    Medication: Mounjaro 2.5mg/0.5ml Pen-Injectors  Insurance Company: [facility] - Phone [phone] Fax [phone]  Expected CoPay:      Pharmacy Filling the Rx: [id number] - [place], [place] - [address] AT [facility] [address]  Pharmacy Notified: Yes  Patient Notified: Yes  **Instructed pharmacy to notify patient when script is ready to /ship.**

## 2023-08-12 DIAGNOSIS — E11.9 TYPE 2 DIABETES MELLITUS WITHOUT COMPLICATION, WITHOUT LONG-TERM CURRENT USE OF INSULIN (H): ICD-10-CM

## 2023-08-14 RX ORDER — TIRZEPATIDE 5 MG/.5ML
5 INJECTION, SOLUTION SUBCUTANEOUS
Qty: 2 ML | Refills: 1 | Status: SHIPPED | OUTPATIENT
Start: 2023-08-14 | End: 2023-10-09

## 2023-08-14 NOTE — TELEPHONE ENCOUNTER
Patient notified via my chart that Mounjaro was sent     Asked to check with pharmacy to see if dexcom is covered then let us know to send to Dr. Simon to place order, may not cover as not on insulin     Ana Laura Jackson, Registered Nurse, PAL (Patient Advocate Liaison)   [facility]   [phone] '

## 2023-08-15 RX ORDER — PROCHLORPERAZINE 25 MG/1
SUPPOSITORY RECTAL
Qty: 3 EACH | Refills: 5 | Status: SHIPPED | OUTPATIENT
Start: 2023-08-15 | End: 2024-02-29

## 2023-08-15 RX ORDER — PROCHLORPERAZINE 25 MG/1
SUPPOSITORY RECTAL
Qty: 1 EACH | Refills: 1 | Status: SHIPPED | OUTPATIENT
Start: 2023-08-15 | End: 2024-02-08

## 2023-08-15 RX ORDER — PROCHLORPERAZINE 25 MG/1
SUPPOSITORY RECTAL
Qty: 1 EACH | Refills: 0 | Status: SHIPPED | OUTPATIENT
Start: 2023-08-15

## 2023-08-15 NOTE — TELEPHONE ENCOUNTER
Dr. Simon   Please review my chart, patient asking for Dexcom   RN pended but unclear if correct order     Please advise if you would like to order or if visit needed, likely will require PA as patient is not on insulin     Thank you   Ana Laura Jackson, Registered Nurse, PAL (Patient Advocate Liaison)   [facility]   [phone]

## 2023-10-04 ASSESSMENT — ASTHMA QUESTIONNAIRES: ACT_TOTALSCORE: 25

## 2023-10-04 NOTE — COMMUNITY RESOURCES LIST (ENGLISH)
10/04/2023   [facility] [place]  N/A  For questions about this resource list or additional care needs, please contact your primary care clinic or care manager.  Phone: [phone]   Email: N/A   Address: [address] [place] [id number]   Hours: N/A        Financial Stability       Utility payment assistance  1  [address] [facility] Distance: 3.59 miles      In-Person, Phone/Virtual   [address] [place] [address]  Language: English  Hours: Mon 8:00 AM - 4:00 PM , Tue 8:00 AM - 7:00 PM , Wed - Thu 8:00 AM - 4:00 PM  Fees: Free   Phone: (885) 127-4861 Email: info@[employer] Website: https://[employer]/resources/resource-centers/     2  [employer] ([facility]) [facility] - Energy Assistance Program (EAP) Distance: 4.53 miles      In-Person   [address] [place] [address]  Language: English, Chilean  Hours: Mon - Fri 8:00 AM - 4:30 PM  Fees: Free   Phone: (503) 654-1221 Email: info@[employer] Website: http://www.NCLC.org          Important Numbers & Websites       Emergency Services   911  City Services   311  Poison Control   (382) 158-8700  Suicide Prevention Lifeline   (753) 464-8917 (TALK)  Child Abuse Hotline   (788) 609-8070 (4-A-Child)  Sexual Assault Hotline   (524) 958-4463 (HOPE)  National Runaway Safeline   (875) 289-1193 (RUNAWAY)  All-Options Talkline   (365) 100-6820  Substance Abuse Referral   (339) 151-4985 (HELP)

## 2023-10-09 ENCOUNTER — OFFICE VISIT (OUTPATIENT)
Dept: FAMILY MEDICINE | Facility: CLINIC | Age: 53
End: 2023-10-09
Attending: FAMILY MEDICINE
Payer: COMMERCIAL

## 2023-10-09 VITALS
SYSTOLIC BLOOD PRESSURE: 134 MMHG | RESPIRATION RATE: 16 BRPM | WEIGHT: 187 LBS | BODY MASS INDEX: 30.05 KG/M2 | OXYGEN SATURATION: 100 % | HEART RATE: 70 BPM | TEMPERATURE: 98 F | DIASTOLIC BLOOD PRESSURE: 85 MMHG | HEIGHT: 66 IN

## 2023-10-09 DIAGNOSIS — E66.09 CLASS 1 OBESITY DUE TO EXCESS CALORIES WITH SERIOUS COMORBIDITY AND BODY MASS INDEX (BMI) OF 32.0 TO 32.9 IN ADULT: ICD-10-CM

## 2023-10-09 DIAGNOSIS — I10 BENIGN ESSENTIAL HYPERTENSION: ICD-10-CM

## 2023-10-09 DIAGNOSIS — E11.9 TYPE 2 DIABETES MELLITUS WITHOUT COMPLICATION, WITHOUT LONG-TERM CURRENT USE OF INSULIN (H): Primary | ICD-10-CM

## 2023-10-09 DIAGNOSIS — J45.20 MILD INTERMITTENT ASTHMA WITHOUT COMPLICATION: ICD-10-CM

## 2023-10-09 DIAGNOSIS — Z12.11 SCREEN FOR COLON CANCER: ICD-10-CM

## 2023-10-09 DIAGNOSIS — E66.811 CLASS 1 OBESITY DUE TO EXCESS CALORIES WITH SERIOUS COMORBIDITY AND BODY MASS INDEX (BMI) OF 32.0 TO 32.9 IN ADULT: ICD-10-CM

## 2023-10-09 LAB
ANION GAP SERPL CALCULATED.3IONS-SCNC: 11 MMOL/L (ref 7–15)
BUN SERPL-MCNC: 12.9 MG/DL (ref 6–20)
CALCIUM SERPL-MCNC: 9.6 MG/DL (ref 8.6–10)
CHLORIDE SERPL-SCNC: 103 MMOL/L (ref 98–107)
CHOLEST SERPL-MCNC: 191 MG/DL
CREAT SERPL-MCNC: 0.75 MG/DL (ref 0.51–0.95)
DEPRECATED HCO3 PLAS-SCNC: 24 MMOL/L (ref 22–29)
EGFRCR SERPLBLD CKD-EPI 2021: >90 ML/MIN/1.73M2
GLUCOSE SERPL-MCNC: 106 MG/DL (ref 70–99)
HBA1C MFR BLD: 5.6 % (ref 0–5.6)
HDLC SERPL-MCNC: 55 MG/DL
LDLC SERPL CALC-MCNC: 108 MG/DL
NONHDLC SERPL-MCNC: 136 MG/DL
POTASSIUM SERPL-SCNC: 4.5 MMOL/L (ref 3.4–5.3)
SODIUM SERPL-SCNC: 138 MMOL/L (ref 135–145)
TRIGL SERPL-MCNC: 138 MG/DL

## 2023-10-09 PROCEDURE — 80048 BASIC METABOLIC PNL TOTAL CA: CPT | Performed by: FAMILY MEDICINE

## 2023-10-09 PROCEDURE — 80061 LIPID PANEL: CPT | Performed by: FAMILY MEDICINE

## 2023-10-09 PROCEDURE — 99214 OFFICE O/P EST MOD 30 MIN: CPT | Performed by: FAMILY MEDICINE

## 2023-10-09 PROCEDURE — 36415 COLL VENOUS BLD VENIPUNCTURE: CPT | Performed by: FAMILY MEDICINE

## 2023-10-09 PROCEDURE — 83036 HEMOGLOBIN GLYCOSYLATED A1C: CPT | Performed by: FAMILY MEDICINE

## 2023-10-09 RX ORDER — TIRZEPATIDE 5 MG/.5ML
5 INJECTION, SOLUTION SUBCUTANEOUS
Qty: 2 ML | Refills: 4 | Status: SHIPPED | OUTPATIENT
Start: 2023-10-09 | End: 2023-11-05 | Stop reason: DRUGHIGH

## 2023-10-09 NOTE — PROGRESS NOTES
Assessment & Plan     Screen for colon cancer  Patient will schedule    Type 2 diabetes mellitus without complication, without long-term current use of insulin (H)  Under control  Stop the metformin since she has been on it once per day and still going low at times.     Could consider mounjaro dose increase if needed but for now stay the same  Discussed statin - patient would like to see lipids one more time now with this weight loss    - HEMOGLOBIN A1C  - Basic metabolic panel  (Ca, Cl, CO2, Creat, Gluc, K, Na, BUN)  - tirzepatide (MOUNJARO) 5 MG/0.5ML pen  Dispense: 2 mL; Refill: 4  - PRIMARY CARE FOLLOW-UP SCHEDULING  - Lipid panel reflex to direct LDL Fasting  - HEMOGLOBIN A1C  - Basic metabolic panel  (Ca, Cl, CO2, Creat, Gluc, K, Na, BUN)  - Lipid panel reflex to direct LDL Fasting    Benign essential hypertension  Under control  High at times.   Could take Irbesartan at night and see if this helps.   - Basic metabolic panel  (Ca, Cl, CO2, Creat, Gluc, K, Na, BUN)  - Basic metabolic panel  (Ca, Cl, CO2, Creat, Gluc, K, Na, BUN)    Mild intermittent asthma without complication  Very good    Class 1 obesity due to excess calories with serious comorbidity and body mass index (BMI) of 32.0 to 32.9 in adult  Coming down to almost just overweight  - tirzepatide (MOUNJARO) 5 MG/0.5ML pen  Dispense: 2 mL; Refill: 4        23 minutes spent by me on the date of the encounter doing chart review, history and exam, documentation and further activities per the note       Work on weight loss  Regular exercise    Bianca Nathan MD  [facility]    Ministerio Bello is a 53 year old, presenting for the following health issues:   she has been on mounjaro for 3 months.   She is on second dose now.   She occasionally gets low blood sugars at 3 am.   She has lost 16 lbs.   She felt stomach upset each time new dose for about a week.     Diabetes        10/9/2023     8:38 AM   Additional Questions    Roomed by Julien DARDEN CMA       History of Present Illness       Diabetes:   She presents for follow up of diabetes.   She is checking home blood glucose with a continuous glucose monitor.   She checks blood glucose before meals, after meals, before and after meals and at bedtime.  Blood glucose is never over 200 and sometimes under 70. She is aware of hypoglycemia symptoms including none.    She has no concerns regarding her diabetes at this time.  She is having weight loss.            She eats 2-3 servings of fruits and vegetables daily.She consumes 0 sweetened beverage(s) daily.She exercises with enough effort to increase her heart rate 30 to 60 minutes per day.  She exercises with enough effort to increase her heart rate 5 days per week.   She is taking medications regularly.     Past Medical History:   Diagnosis Date    Benign heart murmur     since childhood    Diabetes mellitus, type 2 (H) 4/5/2023    Hypertension     Intermittent asthma     Migraines        Past Surgical History:   Procedure Laterality Date    D & C  1993    after miscarriage       MEDICATIONS:  Current Outpatient Medications   Medication    alcohol swab prep pads    blood glucose (NO BRAND SPECIFIED) test strip    blood glucose monitoring (NO BRAND SPECIFIED) meter device kit    Continuous Blood Gluc  (DEXCOM G6 ) TONI    Continuous Blood Gluc Sensor (DEXCOM G6 SENSOR) MISC    Continuous Blood Gluc Transmit (DEXCOM G6 TRANSMITTER) MISC    irbesartan (AVAPRO) 300 MG tablet    thin (NO BRAND SPECIFIED) lancets    tirzepatide (MOUNJARO) 5 MG/0.5ML pen     No current facility-administered medications for this visit.       SOCIAL HISTORY:  Social History     Tobacco Use    Smoking status: Never    Smokeless tobacco: Never   Substance Use Topics    Alcohol use: Yes     Comment: Very rare       Family History   Problem Relation Age of Onset    Cancer Mother         CLL--Chronic lymphatic luekemia    Diabetes Mother     Obesity Mother  "    JEANETTE. Father         MI at  ~40yrs old    Melanoma Father         stage 4 - did trial and he is in remission    Diabetes Father     Cancer Maternal Grandmother         breast cancer    Breast Cancer Maternal Grandmother     Breast Cancer Maternal Aunt         3 aunts    Diabetes Sister     Hypertension Sister     Obesity Sister     Breast Cancer Other     Depression Sister     Anxiety Disorder Sister     Obesity Sister            Review of Systems   Constitutional, HEENT, cardiovascular, pulmonary, gi and gu systems are negative, except as otherwise noted.      Objective    /85 (BP Location: Right arm, Patient Position: Sitting, Cuff Size: Adult Regular)   Pulse 70   Temp 98  F (36.7  C) (Oral)   Resp 16   Ht 1.676 m (5' 6\")   Wt 84.8 kg (187 lb)   LMP 12/01/2019 (Exact Date)   SpO2 100%   BMI 30.18 kg/m    Body mass index is 30.18 kg/m .  Physical Exam   GENERAL: healthy, alert and no distress  EYES: Eyes grossly normal to inspection, PERRL and conjunctivae and sclerae normal  NECK: no adenopathy, no asymmetry, masses, or scars and thyroid normal to palpation  RESP: lungs clear to auscultation - no rales, rhonchi or wheezes  CV: regular rate and rhythm, normal S1 S2, no S3 or S4, no murmur, click or rub, no peripheral edema and peripheral pulses strong  MS: no gross musculoskeletal defects noted, no edema  SKIN: no suspicious lesions or rashes  NEURO: Normal strength and tone, mentation intact and speech normal  PSYCH: mentation appears normal, affect normal/bright  LYMPH: no cervical, supraclavicular, axillary, or inguinal adenopathy    Office Visit on 07/10/2023   Component Date Value Ref Range Status    Hemoglobin A1C 07/10/2023 6.4 (H)  0.0 - 5.6 % Final    Normal <5.7%   Prediabetes 5.7-6.4%    Diabetes 6.5% or higher     Note: Adopted from ADA consensus guidelines.    ALT 07/10/2023 22  0 - 50 U/L Final    Reference intervals for this test were updated on 6/12/2023 to more accurately " reflect our healthy population. There may be differences in the flagging of prior results with similar values performed with this method. Interpretation of those prior results can be made in the context of the updated reference intervals.      Cholesterol 07/10/2023 228 (H)  <200 mg/dL Final    Triglycerides 07/10/2023 117  <150 mg/dL Final    Direct Measure HDL 07/10/2023 60  >=50 mg/dL Final    LDL Cholesterol Calculated 07/10/2023 145 (H)  <=100 mg/dL Final    Non HDL Cholesterol 07/10/2023 168 (H)  <130 mg/dL Final     No results found for this or any previous visit (from the past 24 hour(s)).

## 2023-11-02 ENCOUNTER — MYC MEDICAL ADVICE (OUTPATIENT)
Dept: FAMILY MEDICINE | Facility: CLINIC | Age: 53
End: 2023-11-02
Payer: COMMERCIAL

## 2023-11-02 DIAGNOSIS — E11.9 TYPE 2 DIABETES MELLITUS WITHOUT COMPLICATION, WITHOUT LONG-TERM CURRENT USE OF INSULIN (H): Primary | ICD-10-CM

## 2023-11-02 NOTE — TELEPHONE ENCOUNTER
See my chart     Ana Laura Jackson, Registered Nurse, PAL (Patient Advocate Liaison)   [facility]   [phone]

## 2023-11-03 NOTE — TELEPHONE ENCOUNTER
Dr. Fatima   Please review my chart, RN advised visit needed to change medication dosage, did you want to change without visit?     Thank you   Ana Laura Jackson, Registered Nurse, PAL (Patient Advocate Liaison)   [facility]   [phone]

## 2023-11-05 RX ORDER — TIRZEPATIDE 7.5 MG/.5ML
7.5 INJECTION, SOLUTION SUBCUTANEOUS
Qty: 2 ML | Refills: 3 | Status: SHIPPED | OUTPATIENT
Start: 2023-11-05 | End: 2024-02-27

## 2024-01-13 ENCOUNTER — HEALTH MAINTENANCE LETTER (OUTPATIENT)
Age: 54
End: 2024-01-13

## 2024-02-08 DIAGNOSIS — E11.9 TYPE 2 DIABETES MELLITUS WITHOUT COMPLICATION, WITHOUT LONG-TERM CURRENT USE OF INSULIN (H): ICD-10-CM

## 2024-02-08 DIAGNOSIS — E66.811 CLASS 1 OBESITY DUE TO EXCESS CALORIES WITH SERIOUS COMORBIDITY AND BODY MASS INDEX (BMI) OF 32.0 TO 32.9 IN ADULT: ICD-10-CM

## 2024-02-08 DIAGNOSIS — E66.09 CLASS 1 OBESITY DUE TO EXCESS CALORIES WITH SERIOUS COMORBIDITY AND BODY MASS INDEX (BMI) OF 32.0 TO 32.9 IN ADULT: ICD-10-CM

## 2024-02-08 RX ORDER — PROCHLORPERAZINE 25 MG/1
SUPPOSITORY RECTAL
Qty: 1 EACH | Refills: 1 | Status: SHIPPED | OUTPATIENT
Start: 2024-02-08 | End: 2024-08-19

## 2024-02-22 ENCOUNTER — APPOINTMENT (OUTPATIENT)
Dept: CT IMAGING | Facility: CLINIC | Age: 54
End: 2024-02-22
Attending: EMERGENCY MEDICINE
Payer: COMMERCIAL

## 2024-02-22 ENCOUNTER — HOSPITAL ENCOUNTER (EMERGENCY)
Facility: CLINIC | Age: 54
Discharge: HOME | End: 2024-02-22
Attending: EMERGENCY MEDICINE | Admitting: EMERGENCY MEDICINE
Payer: COMMERCIAL

## 2024-02-22 VITALS
RESPIRATION RATE: 28 BRPM | WEIGHT: 170 LBS | HEIGHT: 66 IN | DIASTOLIC BLOOD PRESSURE: 83 MMHG | SYSTOLIC BLOOD PRESSURE: 140 MMHG | BODY MASS INDEX: 27.32 KG/M2 | TEMPERATURE: 97.5 F | HEART RATE: 69 BPM | OXYGEN SATURATION: 100 %

## 2024-02-22 DIAGNOSIS — N20.0 KIDNEY STONE: ICD-10-CM

## 2024-02-22 DIAGNOSIS — R10.9 ABDOMINAL PAIN, UNSPECIFIED ABDOMINAL LOCATION: ICD-10-CM

## 2024-02-22 LAB
ALBUMIN SERPL BCG-MCNC: 4.6 G/DL (ref 3.5–5.2)
ALBUMIN UR-MCNC: NEGATIVE MG/DL
ALP SERPL-CCNC: 89 U/L (ref 40–150)
ALT SERPL W P-5'-P-CCNC: 17 U/L (ref 0–50)
ANION GAP SERPL CALCULATED.3IONS-SCNC: 14 MMOL/L (ref 7–15)
APPEARANCE UR: CLEAR
AST SERPL W P-5'-P-CCNC: 19 U/L (ref 0–45)
BASOPHILS # BLD AUTO: 0.1 10E3/UL (ref 0–0.2)
BASOPHILS NFR BLD AUTO: 1 %
BILIRUB SERPL-MCNC: 0.6 MG/DL
BILIRUB UR QL STRIP: NEGATIVE
BUN SERPL-MCNC: 14.3 MG/DL (ref 6–20)
CALCIUM SERPL-MCNC: 9.9 MG/DL (ref 8.6–10)
CHLORIDE SERPL-SCNC: 104 MMOL/L (ref 98–107)
COLOR UR AUTO: ABNORMAL
CREAT SERPL-MCNC: 0.84 MG/DL (ref 0.51–0.95)
DEPRECATED HCO3 PLAS-SCNC: 21 MMOL/L (ref 22–29)
EGFRCR SERPLBLD CKD-EPI 2021: 81 ML/MIN/1.73M2
EOSINOPHIL # BLD AUTO: 0.1 10E3/UL (ref 0–0.7)
EOSINOPHIL NFR BLD AUTO: 1 %
ERYTHROCYTE [DISTWIDTH] IN BLOOD BY AUTOMATED COUNT: 12.1 % (ref 10–15)
GLUCOSE SERPL-MCNC: 135 MG/DL (ref 70–99)
GLUCOSE UR STRIP-MCNC: NEGATIVE MG/DL
HCT VFR BLD AUTO: 42.6 % (ref 35–47)
HGB BLD-MCNC: 14.6 G/DL (ref 11.7–15.7)
HGB UR QL STRIP: ABNORMAL
HOLD SPECIMEN: NORMAL
IMM GRANULOCYTES # BLD: 0 10E3/UL
IMM GRANULOCYTES NFR BLD: 0 %
KETONES UR STRIP-MCNC: NEGATIVE MG/DL
LEUKOCYTE ESTERASE UR QL STRIP: ABNORMAL
LIPASE SERPL-CCNC: 142 U/L (ref 13–60)
LYMPHOCYTES # BLD AUTO: 3.8 10E3/UL (ref 0.8–5.3)
LYMPHOCYTES NFR BLD AUTO: 44 %
MAGNESIUM SERPL-MCNC: 2 MG/DL (ref 1.7–2.3)
MCH RBC QN AUTO: 28.4 PG (ref 26.5–33)
MCHC RBC AUTO-ENTMCNC: 34.3 G/DL (ref 31.5–36.5)
MCV RBC AUTO: 83 FL (ref 78–100)
MONOCYTES # BLD AUTO: 0.7 10E3/UL (ref 0–1.3)
MONOCYTES NFR BLD AUTO: 8 %
NEUTROPHILS # BLD AUTO: 4.1 10E3/UL (ref 1.6–8.3)
NEUTROPHILS NFR BLD AUTO: 46 %
NITRATE UR QL: NEGATIVE
NRBC # BLD AUTO: 0 10E3/UL
NRBC BLD AUTO-RTO: 0 /100
PH UR STRIP: 7 [PH] (ref 5–7)
PLATELET # BLD AUTO: 338 10E3/UL (ref 150–450)
POTASSIUM SERPL-SCNC: 3.7 MMOL/L (ref 3.4–5.3)
PROT SERPL-MCNC: 7.8 G/DL (ref 6.4–8.3)
RBC # BLD AUTO: 5.14 10E6/UL (ref 3.8–5.2)
RBC URINE: 14 /HPF
SODIUM SERPL-SCNC: 139 MMOL/L (ref 135–145)
SP GR UR STRIP: 1.03 (ref 1–1.03)
SQUAMOUS EPITHELIAL: 2 /HPF
UROBILINOGEN UR STRIP-MCNC: NORMAL MG/DL
WBC # BLD AUTO: 8.8 10E3/UL (ref 4–11)
WBC URINE: 4 /HPF

## 2024-02-22 PROCEDURE — 83690 ASSAY OF LIPASE: CPT | Performed by: EMERGENCY MEDICINE

## 2024-02-22 PROCEDURE — 250N000011 HC RX IP 250 OP 636: Performed by: EMERGENCY MEDICINE

## 2024-02-22 PROCEDURE — 85025 COMPLETE CBC W/AUTO DIFF WBC: CPT | Performed by: EMERGENCY MEDICINE

## 2024-02-22 PROCEDURE — 81001 URINALYSIS AUTO W/SCOPE: CPT | Performed by: EMERGENCY MEDICINE

## 2024-02-22 PROCEDURE — 96374 THER/PROPH/DIAG INJ IV PUSH: CPT | Mod: 59

## 2024-02-22 PROCEDURE — 74177 CT ABD & PELVIS W/CONTRAST: CPT

## 2024-02-22 PROCEDURE — 36415 COLL VENOUS BLD VENIPUNCTURE: CPT | Performed by: EMERGENCY MEDICINE

## 2024-02-22 PROCEDURE — 96361 HYDRATE IV INFUSION ADD-ON: CPT

## 2024-02-22 PROCEDURE — 250N000009 HC RX 250: Performed by: EMERGENCY MEDICINE

## 2024-02-22 PROCEDURE — 99285 EMERGENCY DEPT VISIT HI MDM: CPT | Mod: 25

## 2024-02-22 PROCEDURE — 250N000013 HC RX MED GY IP 250 OP 250 PS 637: Performed by: EMERGENCY MEDICINE

## 2024-02-22 PROCEDURE — 258N000003 HC RX IP 258 OP 636: Performed by: EMERGENCY MEDICINE

## 2024-02-22 PROCEDURE — 87086 URINE CULTURE/COLONY COUNT: CPT | Performed by: EMERGENCY MEDICINE

## 2024-02-22 PROCEDURE — 83735 ASSAY OF MAGNESIUM: CPT | Performed by: EMERGENCY MEDICINE

## 2024-02-22 PROCEDURE — 80053 COMPREHEN METABOLIC PANEL: CPT | Performed by: EMERGENCY MEDICINE

## 2024-02-22 PROCEDURE — 96375 TX/PRO/DX INJ NEW DRUG ADDON: CPT

## 2024-02-22 RX ORDER — KETOROLAC TROMETHAMINE 15 MG/ML
15 INJECTION, SOLUTION INTRAMUSCULAR; INTRAVENOUS ONCE
Status: COMPLETED | OUTPATIENT
Start: 2024-02-22 | End: 2024-02-22

## 2024-02-22 RX ORDER — TAMSULOSIN HYDROCHLORIDE 0.4 MG/1
0.4 CAPSULE ORAL ONCE
Status: COMPLETED | OUTPATIENT
Start: 2024-02-22 | End: 2024-02-22

## 2024-02-22 RX ORDER — HYDROCODONE BITARTRATE AND ACETAMINOPHEN 5; 325 MG/1; MG/1
1 TABLET ORAL EVERY 6 HOURS PRN
Qty: 10 TABLET | Refills: 0 | Status: SHIPPED | OUTPATIENT
Start: 2024-02-22 | End: 2024-02-25

## 2024-02-22 RX ORDER — ONDANSETRON 4 MG/1
4 TABLET, ORALLY DISINTEGRATING ORAL EVERY 8 HOURS PRN
Qty: 12 TABLET | Refills: 0 | Status: SHIPPED | OUTPATIENT
Start: 2024-02-22 | End: 2024-03-19

## 2024-02-22 RX ORDER — ONDANSETRON 2 MG/ML
4 INJECTION INTRAMUSCULAR; INTRAVENOUS
Status: COMPLETED | OUTPATIENT
Start: 2024-02-22 | End: 2024-02-22

## 2024-02-22 RX ORDER — IOPAMIDOL 755 MG/ML
500 INJECTION, SOLUTION INTRAVASCULAR ONCE
Status: COMPLETED | OUTPATIENT
Start: 2024-02-22 | End: 2024-02-22

## 2024-02-22 RX ORDER — IBUPROFEN 800 MG/1
800 TABLET, FILM COATED ORAL EVERY 8 HOURS PRN
Qty: 30 TABLET | Refills: 0 | Status: SHIPPED | OUTPATIENT
Start: 2024-02-22 | End: 2024-07-30

## 2024-02-22 RX ORDER — TAMSULOSIN HYDROCHLORIDE 0.4 MG/1
0.4 CAPSULE ORAL DAILY
Qty: 10 CAPSULE | Refills: 0 | Status: SHIPPED | OUTPATIENT
Start: 2024-02-22 | End: 2024-03-03

## 2024-02-22 RX ORDER — MORPHINE SULFATE 4 MG/ML
4 INJECTION, SOLUTION INTRAMUSCULAR; INTRAVENOUS ONCE
Status: COMPLETED | OUTPATIENT
Start: 2024-02-22 | End: 2024-02-22

## 2024-02-22 RX ADMIN — KETOROLAC TROMETHAMINE 15 MG: 15 INJECTION, SOLUTION INTRAMUSCULAR; INTRAVENOUS at 08:45

## 2024-02-22 RX ADMIN — IOPAMIDOL 85 ML: 755 INJECTION, SOLUTION INTRAVENOUS at 07:46

## 2024-02-22 RX ADMIN — ONDANSETRON 4 MG: 2 INJECTION INTRAMUSCULAR; INTRAVENOUS at 06:15

## 2024-02-22 RX ADMIN — SODIUM CHLORIDE 1000 ML: 9 INJECTION, SOLUTION INTRAVENOUS at 07:24

## 2024-02-22 RX ADMIN — SODIUM CHLORIDE 61 ML: 9 INJECTION, SOLUTION INTRAVENOUS at 07:46

## 2024-02-22 RX ADMIN — TAMSULOSIN HYDROCHLORIDE 0.4 MG: 0.4 CAPSULE ORAL at 08:45

## 2024-02-22 RX ADMIN — MORPHINE SULFATE 4 MG: 4 INJECTION, SOLUTION INTRAMUSCULAR; INTRAVENOUS at 07:02

## 2024-02-22 NOTE — ED TRIAGE NOTES
Pt reports 10/10 lower left quadrant abdominal pain that woke her up from sleep about one hour ago. Pt is also nauseous, pt denies diarrhea. Pt is tearful and tachypnic in triage.

## 2024-02-22 NOTE — ED PROVIDER NOTES
"  History     Chief Complaint:  Abdominal Pain    HPI   Eugenia Santoyo is a 54 year old female with history of type 2 diabetes and hypertension who presents with onset of left-sided abdominal pain and nausea. She states that it began around 2 hours ago and notes that it is waxing and waning. Describes it as a \"stabbing\" pain that at first felt like a \"contraction\". Endorses dry heaving and diaphoresis. Denies any fever or chills. No urinary symptoms. She notes that she takes weekly Mounjaro that has caused more diarrhea for almost a year. She has had a D&C in the past and has not had other abdominal surgeries. She has not had a colonoscopy in the past and has one scheduled for next week.    Independent Historian:   None - Patient Only    Review of External Notes:   Reviewed patient's medical records last PCP visit was December 9, 2023 patient with type 2 diabetes on Mounjaro    Medications:    Irbesartan  Tirzepatide     Past Medical History:    Type 2 diabetes  Hypertension  Asthma  Migraines  Heart murmur     Past Surgical History:    D&C     Physical Exam   Patient Vitals for the past 24 hrs:   BP Temp Temp src Pulse Resp SpO2 Height Weight   02/22/24 0936 -- -- -- -- -- 100 % -- --   02/22/24 0930 (!) 140/83 -- -- 69 -- 100 % -- --   02/22/24 0924 -- -- -- -- -- 100 % -- --   02/22/24 0923 -- -- -- -- -- 100 % -- --   02/22/24 0921 -- -- -- -- -- 100 % -- --   02/22/24 0900 -- -- -- -- -- 99 % -- --   02/22/24 0859 -- -- -- -- -- 100 % -- --   02/22/24 0857 -- -- -- -- -- 100 % -- --   02/22/24 0856 -- -- -- -- -- 100 % -- --   02/22/24 0855 -- -- -- -- -- 99 % -- --   02/22/24 0830 125/84 -- -- 71 -- 98 % -- --   02/22/24 0829 -- -- -- -- -- 99 % -- --   02/22/24 0827 -- -- -- -- -- 99 % -- --   02/22/24 0826 -- -- -- -- -- 99 % -- --   02/22/24 0821 -- -- -- -- -- 99 % -- --   02/22/24 0820 -- -- -- -- -- 99 % -- --   02/22/24 0713 -- -- -- -- -- 99 % -- --   02/22/24 0711 -- -- -- -- -- 99 % -- --   02/22/24 " "0710 -- -- -- -- -- 100 % -- --   02/22/24 0656 (!) 156/92 -- -- -- -- 100 % -- --   02/22/24 0626 (!) 151/93 -- -- -- -- 99 % -- --   02/22/24 0543 (!) 141/92 97.5  F (36.4  C) Oral 73 28 100 % 1.676 m (5' 6\") 77.1 kg (170 lb)        Physical Exam  Vitals reviewed.   Constitutional:       General: She is not in acute distress.     Appearance: She is not ill-appearing.   HENT:      Head: Normocephalic and atraumatic.   Eyes:      Extraocular Movements: Extraocular movements intact.   Cardiovascular:      Rate and Rhythm: Normal rate and regular rhythm.   Pulmonary:      Effort: Pulmonary effort is normal. No respiratory distress.      Breath sounds: Normal breath sounds. No wheezing.   Abdominal:      Palpations: Abdomen is soft.      Tenderness: There is abdominal tenderness (Mild tenderness to left lower quadrant). There is no guarding.   Musculoskeletal:      Cervical back: Normal range of motion.   Skin:     General: Skin is warm and dry.   Neurological:      Mental Status: She is alert and oriented to person, place, and time.      GCS: GCS eye subscore is 4. GCS verbal subscore is 5. GCS motor subscore is 6.   Psychiatric:         Behavior: Behavior normal.           Emergency Department Course   Imaging:  CT Abdomen Pelvis w Contrast   Final Result   IMPRESSION:    1.  Obstructing 0.3 cm stones at the left ureterovesical and   ureteropelvic junctions cause mild left hydronephrosis.   2.  Nonobstructing 0.2 cm stone in the lower pole of the left kidney.   3.  Cholelithiasis.      SIGIFREDO MANNING MD            SYSTEM ID:  [id number]        Laboratory:  Labs Ordered and Resulted from Time of ED Arrival to Time of ED Departure   COMPREHENSIVE METABOLIC PANEL - Abnormal       Result Value    Sodium 139      Potassium 3.7      Carbon Dioxide (CO2) 21 (*)     Anion Gap 14      Urea Nitrogen 14.3      Creatinine 0.84      GFR Estimate 81      Calcium 9.9      Chloride 104      Glucose 135 (*)     Alkaline Phosphatase " 89      AST 19      ALT 17      Protein Total 7.8      Albumin 4.6      Bilirubin Total 0.6     LIPASE - Abnormal    Lipase 142 (*)    ROUTINE UA WITH MICROSCOPIC REFLEX TO CULTURE - Abnormal    Color Urine Straw      Appearance Urine Clear      Glucose Urine Negative      Bilirubin Urine Negative      Ketones Urine Negative      Specific Gravity Urine 1.031      Blood Urine Large (*)     pH Urine 7.0      Protein Albumin Urine Negative      Urobilinogen Urine Normal      Nitrite Urine Negative      Leukocyte Esterase Urine Moderate (*)     RBC Urine 14 (*)     WBC Urine 4      Squamous Epithelials Urine 2 (*)    MAGNESIUM - Normal    Magnesium 2.0     CBC WITH PLATELETS AND DIFFERENTIAL    WBC Count 8.8      RBC Count 5.14      Hemoglobin 14.6      Hematocrit 42.6      MCV 83      MCH 28.4      MCHC 34.3      RDW 12.1      Platelet Count 338      % Neutrophils 46      % Lymphocytes 44      % Monocytes 8      % Eosinophils 1      % Basophils 1      % Immature Granulocytes 0      NRBCs per 100 WBC 0      Absolute Neutrophils 4.1      Absolute Lymphocytes 3.8      Absolute Monocytes 0.7      Absolute Eosinophils 0.1      Absolute Basophils 0.1      Absolute Immature Granulocytes 0.0      Absolute NRBCs 0.0     URINE CULTURE     Emergency Department Course & Assessments:       Interventions:  Medications   ondansetron (ZOFRAN) injection 4 mg (4 mg Intravenous $Given 2/22/24 0615)   morphine (PF) injection 4 mg (4 mg Intravenous $Given 2/22/24 0702)   sodium chloride 0.9% BOLUS 1,000 mL (0 mLs Intravenous Stopped 2/22/24 0939)   CT Saline Flush (61 mLs Intravenous $Given 2/22/24 0746)   iopamidol (ISOVUE-370) solution 500 mL (85 mLs Intravenous $Given 2/22/24 0746)   ketorolac (TORADOL) injection 15 mg (15 mg Intravenous $Given 2/22/24 0845)   tamsulosin (FLOMAX) capsule 0.4 mg (0.4 mg Oral $Given 2/22/24 0845)     Assessments Independent Interpretation, & Consultations/Discussion of Management or Tests:  ED Course as  of 24 0957   Thu 2024   0610 I entered the patient's room and obtained history.   0702 Lipase(!): [phone] Updated patient on results.  States pain is significantly improved.  Discussed with her diagnosis of 2 obstructing kidney stones on the left side.  UA shows moderate amount of leukocyte esterase trace, 4 WBCs.  White count is normal.  BUN, creatinine is normal.  Will reassess.    Patient also informed of elevated lipase.  Denies any alcohol use.  Denies any epigastric, upper abdominal pain.   09 I rechecked the patient. She is doing better and tolerated PO. I believe she is safe for discharge at this time.      Social Determinants of Health affecting care:   None    Disposition:  The patient was discharged.     Impression & Plan    CMS Diagnoses: None    Medical Decision Makin-year-old female presenting today with left-sided abdominal pain that started this morning.  She has had associated nausea without vomiting.  No urinary symptoms.  No changes in her bowel habits.  She has been on Mounjaro has had chronic diarrhea since being on that.  She has mild tenderness to the left lower quadrant.  Blood work, urine, CT abdomen pelvis ordered.  During initial assessment she declined any pain medications but then developed pain while in the ER she was given morphine with relief.  Her labs were significant for white count of 8.8 BUN, creatinine within normal limits.  Hematuria in the urine with no infection.  Her CT showed 2 obstructing stones on the left side measuring 3 mm.  Her lipase was found to be elevated however she denies any upper abdominal pain.  No history of pancreatitis or alcohol use.  CT showed no signs of pancreatitis on imaging.  Patient was updated on diagnosis of obstructing kidney stone.  She was given Toradol for pain as well as Flomax.  Patient was able to tolerate p.o.  Pain was well-controlled.  She was comfortable with discharge home.  Discussed with her care  instructions regarding kidney stones including close follow-up with urology.  Discussed plan for discharge home with Motrin for pain and Norco for breakthrough pain.  She was also sent home on Zofran and Flomax.  I discussed with her strict return precautions including any fevers, uncontrolled pain, inability tolerate p.o. or any concerning symptoms. All questions and concerns addressed at this time.       Diagnosis:    ICD-10-CM    1. Kidney stone  N20.0 Adult Urology  Referral      2. Abdominal pain, unspecified abdominal location  R10.9          Discharge Medications:  Discharge Medication List as of 2/22/2024  9:40 AM        START taking these medications    Details   HYDROcodone-acetaminophen (NORCO) 5-325 MG tablet Take 1 tablet by mouth every 6 hours as needed for severe pain, Disp-10 tablet, R-0, Local Print      ibuprofen (ADVIL/MOTRIN) 800 MG tablet Take 1 tablet (800 mg) by mouth every 8 hours as needed for moderate pain, Disp-30 tablet, R-0, Local Print      ondansetron (ZOFRAN ODT) 4 MG ODT tab Take 1 tablet (4 mg) by mouth every 8 hours as needed for nausea, Disp-12 tablet, R-0, Local Print      tamsulosin (FLOMAX) 0.4 MG capsule Take 1 capsule (0.4 mg) by mouth daily for 10 doses, Disp-10 capsule, R-0, Local Print           Scribe Disclosure:  Pawan YANGed, am serving as a scribe at 6:05 AM on 2/22/2024 to document services personally performed by Aleyda Houston DO based on my observations and the provider's statements to me.   2/22/2024   Aleyda Houston DO Doan, Tiffani, DO  02/22/24 1012

## 2024-02-22 NOTE — DISCHARGE INSTRUCTIONS
You have Two 3 mm kidney stone in the tube from the bladder to the kidney on the left side.  It is at located at UVJ and in the ureter. A stone of this size is likely to pass on it's own.  ?  You were sent home with prescriptions for ibuprofen and Norco. Use the ibuprofen for your main pain control. Only use the Norco for severe breakthrough pain.     There is also a prescription for Flomax please be sure to take this medication.     You were also sent home with Zofran for nausea. Use this only as needed, but not more than every 6-8 hours.     Be sure to follow up with the urologist in the next 2-3 days to be re-evaluated. You were sent home with a urine strainer as well. Please use this until you have passed your kidney stone and see it in the strainer. Use the sterile container given to you to transport the stone to your appointment with the urologist.    If you have any fevers, chills, persistent vomiting, uncontrolled pain, or any concerning symptoms return to the ER immediately.

## 2024-02-23 LAB — BACTERIA UR CULT: NORMAL

## 2024-02-27 DIAGNOSIS — E11.9 TYPE 2 DIABETES MELLITUS WITHOUT COMPLICATION, WITHOUT LONG-TERM CURRENT USE OF INSULIN (H): ICD-10-CM

## 2024-02-27 RX ORDER — TIRZEPATIDE 7.5 MG/.5ML
INJECTION, SOLUTION SUBCUTANEOUS
Qty: 2 ML | Refills: 3 | Status: SHIPPED | OUTPATIENT
Start: 2024-02-27 | End: 2024-04-09

## 2024-02-29 DIAGNOSIS — E66.811 CLASS 1 OBESITY DUE TO EXCESS CALORIES WITH SERIOUS COMORBIDITY AND BODY MASS INDEX (BMI) OF 32.0 TO 32.9 IN ADULT: ICD-10-CM

## 2024-02-29 DIAGNOSIS — E11.9 TYPE 2 DIABETES MELLITUS WITHOUT COMPLICATION, WITHOUT LONG-TERM CURRENT USE OF INSULIN (H): ICD-10-CM

## 2024-02-29 DIAGNOSIS — E66.09 CLASS 1 OBESITY DUE TO EXCESS CALORIES WITH SERIOUS COMORBIDITY AND BODY MASS INDEX (BMI) OF 32.0 TO 32.9 IN ADULT: ICD-10-CM

## 2024-02-29 RX ORDER — PROCHLORPERAZINE 25 MG/1
SUPPOSITORY RECTAL
Qty: 3 EACH | Refills: 2 | Status: SHIPPED | OUTPATIENT
Start: 2024-02-29 | End: 2024-04-09

## 2024-03-04 ENCOUNTER — TRANSFERRED RECORDS (OUTPATIENT)
Dept: HEALTH INFORMATION MANAGEMENT | Facility: CLINIC | Age: 54
End: 2024-03-04
Payer: COMMERCIAL

## 2024-03-18 ENCOUNTER — MYC MEDICAL ADVICE (OUTPATIENT)
Dept: FAMILY MEDICINE | Facility: CLINIC | Age: 54
End: 2024-03-18
Payer: COMMERCIAL

## 2024-03-18 DIAGNOSIS — R11.2 NAUSEA AND VOMITING, UNSPECIFIED VOMITING TYPE: Primary | ICD-10-CM

## 2024-03-19 ENCOUNTER — VIRTUAL VISIT (OUTPATIENT)
Dept: UROLOGY | Facility: CLINIC | Age: 54
End: 2024-03-19
Payer: COMMERCIAL

## 2024-03-19 ENCOUNTER — PATIENT OUTREACH (OUTPATIENT)
Dept: FAMILY MEDICINE | Facility: CLINIC | Age: 54
End: 2024-03-19

## 2024-03-19 DIAGNOSIS — N20.0 KIDNEY STONE: ICD-10-CM

## 2024-03-19 PROCEDURE — 99202 OFFICE O/P NEW SF 15 MIN: CPT | Mod: 95 | Performed by: NURSE PRACTITIONER

## 2024-03-19 ASSESSMENT — PAIN SCALES - GENERAL: PAINLEVEL: NO PAIN (0)

## 2024-03-19 NOTE — TELEPHONE ENCOUNTER
See pt MyChart message  -Has 5 tabs left of current Zofran rx    New order pended, routing to Dr. Nathan to review and sign     Arlyn SWIFT RN  Patient Advocate Liaison - PAL RN  [facility]  [phone]

## 2024-03-19 NOTE — TELEPHONE ENCOUNTER
See pt MyChart message     Pt sent home with small amount of Zofran at ED visit on 2/22/24, PAL inquiring if pt found helpful     Pt has upcoming appt with Dr. Nathan 4/9/24    Routing to Dr. Nathan to review and advise    Arlyn SWIFT RN  Patient Advocate Liaison - PAL RN  [facility]  [phone]

## 2024-03-19 NOTE — TELEPHONE ENCOUNTER
See pt MyChart message    PAL replied via [employer]   -Inquired if pt needs more Barry SWIFT RN  Patient Advocate Liaison - [facility]  [phone]

## 2024-03-19 NOTE — NURSING NOTE
Is the patient currently in the state of [place]? YES    Visit mode:VIDEO    If the visit is dropped, the patient can be reconnected by: VIDEO VISIT: Text to cell phone:   Telephone Information:   Mobile [phone]       Will anyone else be joining the visit? NO  (If patient encounters technical issues they should call [phone])    How would you like to obtain your AVS? MyChart    Are changes needed to the allergy or medication list? No    Reason for visit: Consult (New kidney stone)    Shanel CHAMPION

## 2024-03-19 NOTE — LETTER
3/19/2024       RE: Eugenia Santoyo  [address] [facility] [address]     Dear Colleague,    Thank you for referring your patient, Eugenia Santoyo, to the [facility]OLOG[facility] [place] at [facility]. Please see a copy of my visit note below.    Consult conducted via real-time audio/video technology by DARBY Leach CNP from [facility] to the patient from their place of work. Patient consented to this billed visit that was started at 9:43 and completed at 9:50.    Urology Virtual Visit    Name: Eugenia Santoyo    MRN: [id number]   YOB: 1970               Chief Complaint:   Nephrolithiasis          Impression and Plan:   Impression / Plan:   Eugenia Santoyo is a 54 year old female with L ureterolithiasis       -Presumed passage of stone.  -Provided with low oxalate diet.  -Sipping fluids throughout the day to decrease risk of stone formation discussed.  -I mentioned that Crystal Light Lemonade is an excellent source of citrate. Discussed citrate inhibits stone formation by complexing with calcium in the urine, preventing growth and agglomeration of crystals.  -SRP discussed.    Thank you for the opportunity to participate in the care of Eugenia Santoyo.     DARBY Kidd, CNP   Physicians - Department of Urology  [phone]          History of Present Illness:   Eugenia Santoyo is a 54 year old female with history of type 2 diabetes and hypertension who originally presented to the emergency department on 2- with a chief complaint of left-sided abdominal pain and nausea.  Her evaluation in the emergency department revealed a 3 mm stone near the left ureterovesicular junction, as well as a nonobstructing smaller upstream stone at the left ureteropelvic junction.  No prior history of kidney stones.  She was discharged with prescriptions for Zofran, Flomax, Norco, Advil, and Flomax.  She  stopped the Flomax before running out of the prescription due to the associated side effect of congestion.  Intermittently strained her urine, has not captured any stones.  Has not had any pain for the past few weeks.  I reviewed her labs from the emergency department.   History is obtained from patient & EMR    Pertinent imaging reviewed:  CT ABDOMEN/PELVIS WITH CONTRAST February 22, 2024 8:00 AM   Small L UPJ & distal ureteral stone.              Past Medical History:     Past Medical History:   Diagnosis Date    Benign heart murmur     since childhood    Diabetes mellitus, type 2 (H) 4/5/2023    Hypertension     Intermittent asthma     Migraines           Past Surgical History:     Past Surgical History:   Procedure Laterality Date    COLONOSCOPY  03/2024    10 yrs    D & C  01/01/1993    after miscarriage          Social History:     Social History     Tobacco Use    Smoking status: Never    Smokeless tobacco: Never   Substance Use Topics    Alcohol use: Not Currently     Comment: Very rare          Family History:     Family History   Problem Relation Age of Onset    Cancer Mother         CLL--Chronic lymphatic luekemia    Diabetes Mother     Obesity Mother     C.A.D. Father         MI at  ~40yrs old    Melanoma Father         stage 4 - did trial and he is in remission    Diabetes Father     Cancer Maternal Grandmother         breast cancer    Breast Cancer Maternal Grandmother     Breast Cancer Maternal Aunt         3 aunts    Diabetes Sister     Hypertension Sister     Obesity Sister     Breast Cancer Other     Depression Sister     Anxiety Disorder Sister     Obesity Sister           Allergies:     Allergies   Allergen Reactions    Imitrex [Sumatriptan Succinate]      Numbness          Medications:     Current Outpatient Medications   Medication Sig    alcohol swab prep pads Use to swab area of injection/joe as directed.    blood glucose (NO BRAND SPECIFIED) test strip Use to test blood sugar 1 times daily  "or as directed. To accompany: Blood Glucose Monitor Brands: per insurance.    blood glucose monitoring (NO BRAND SPECIFIED) meter device kit Use to test blood sugar 1 times daily or as directed. Preferred blood glucose meter OR supplies to accompany: Blood Glucose Monitor Brands: per insurance.    Continuous Blood Gluc  (DEXCOM G6 ) TONI Use to read blood sugars as per 's instructions.    Continuous Blood Gluc Sensor (DEXCOM G6 SENSOR) MISC CHANGE EVERY 10 DAYS.    Continuous Blood Gluc Transmit (DEXCOM G6 TRANSMITTER) MISC CHANGE EVERY 3 MONTHS.    ibuprofen (ADVIL/MOTRIN) 800 MG tablet Take 1 tablet (800 mg) by mouth every 8 hours as needed for moderate pain    irbesartan (AVAPRO) 300 MG tablet Take 1 tablet (300 mg) by mouth daily    MOUNJARO 7.5 MG/0.5ML pen ADMINISTER 7.5 MG UNDER THE SKIN EVERY 7 DAYS    ondansetron (ZOFRAN ODT) 4 MG ODT tab Take 1 tablet (4 mg) by mouth every 8 hours as needed for nausea    thin (NO BRAND SPECIFIED) lancets Use to test blood sugar 1 times daily or as directed. To accompany: Blood Glucose Monitor Brands: per insurance.     No current facility-administered medications for this visit.          Review of Systems:    ROS: See HPI for pertinent details.  Remainder of 10-point ROS negative.         Physical Exam:   VS:  T: Data Unavailable    HR: Data Unavailable    BP: Data Unavailable    RR: Data Unavailable     GEN:  Alert.  NAD.    HEENT:  Sclerae anicteric.    CV:  No obvious jugular venous distension  LUNGS: No respiratory distress, breathing comfortably wo accessory muscle use.  SKIN:  No visible rash  NEURO:  CN grossly intact.          Data:   All laboratory data reviewed:  No results found for: \"PSA\"  Lab Results   Component Value Date    CR 0.84 02/22/2024    CR 0.75 10/09/2023    CR 0.73 04/05/2023    CR 0.79 02/09/2022    CR 0.68 01/20/2021    CR 0.67 12/04/2019    CR 0.73 06/22/2017    CR 0.69 07/09/2014    CR 0.69 07/13/2009     Lab " Results   Component Value Date    GFRESTIMATED 81 02/22/2024    GFRESTIMATED >90 10/09/2023    GFRESTIMATED >90 04/05/2023    GFRESTIMATED 88 02/09/2022    GFRESTIMATED >90 01/20/2021    GFRESTIMATED >90 12/04/2019    GFRESTIMATED 84 06/22/2017    GFRESTIMATED >90 07/09/2014    GFRESTIMATED >90 07/13/2009     Color Urine (no units)   Date Value   02/22/2024 Straw   06/22/2017 Yellow     Appearance Urine (no units)   Date Value   02/22/2024 Clear   06/22/2017 Clear     Glucose Urine (mg/dL)   Date Value   02/22/2024 Negative   06/22/2017 Negative     Bilirubin Urine (no units)   Date Value   02/22/2024 Negative   06/22/2017 Negative     Ketones Urine (mg/dL)   Date Value   02/22/2024 Negative   06/22/2017 Negative     Specific Gravity Urine (no units)   Date Value   02/22/2024 1.031   06/22/2017 1.025     pH Urine   Date Value   02/22/2024 7.0   06/22/2017 5.5 pH     Protein Albumin Urine (mg/dL)   Date Value   02/22/2024 Negative   06/22/2017 30 (A)     Urobilinogen Urine (EU/dL)   Date Value   06/22/2017 0.2     Nitrite Urine (no units)   Date Value   02/22/2024 Negative   06/22/2017 Negative     Leukocyte Esterase Urine (no units)   Date Value   02/22/2024 Moderate (A)   06/22/2017 Negative

## 2024-03-19 NOTE — TELEPHONE ENCOUNTER
Patient Quality Outreach Health Maintenance - PAL RN    Summary:    PAL RN reviewed patient's overdue health maintenance     Patient is due/failing the following:   Diabetes -  A1C, Eye Exam, and Microalbumin  Asthma  -  ACT needed and AAP  Physical Preventive Adult Physical      Topic Date Due    Pneumococcal Vaccine (1 of 2 - PCV) Never done    Zoster (Shingles) Vaccine (1 of 2) Never done     Health Maintenance Due   Topic Date Due    Pneumococcal Vaccine: Pediatrics (0 to 5 Years) and At-Risk Patients (6 to 64 Years) (1 of 2 - PCV) Never done    ZOSTER IMMUNIZATION (1 of 2) Never done    EYE EXAM  11/18/2023    A1C  01/09/2024    YEARLY PREVENTIVE VISIT  04/05/2024    MICROALBUMIN  04/05/2024    ANNUAL REVIEW OF HM ORDERS  04/05/2024    ASTHMA ACTION PLAN  04/05/2024    ASTHMA CONTROL TEST  04/09/2024     Type of outreach:    Sent letter. and chart review performed     -SB3/5 PAL welcome letter sent   -Sent [employer] message advising pt to schedule either nurse only appt or appt at [facility] pharmacy to receive overdue immunizations, provided phone number and link to schedule   -Pt has upcoming yearly preventative visit with Dr. Nathan on 4/9/24 at 7:40am     Questions for provider review:    None                                                                                     Arlyn APPLE RN  Patient Advocate Liaison - PAL RN  [facility]  [phone]

## 2024-03-19 NOTE — PATIENT INSTRUCTIONS
Stone Prevention Measures:    OXALATE DIET    Beverages to avoid:  Draft beer  Ovaltine  Cocoa    Recommended beverages:  Coffee  Beer (bottle)  Carbonated soda  Distilled alcohol  Lemonade  Wine: red, irma, white  Buttermilk, Whole, lowfat or skim milk  Yogurt with allowed  fruits  Soy, almond and rice  Milk    Vegetables to avoid:  Beets**  greens  Collards  Kale  Leeks  Mustard greens  Okra  Parsley**  Sweet potato**  Rutabagas  Spinach**  Swiss chard**  Watercress**    Recommended vegetables:  Asparagus  Broccoli  Carrots  Corn: sweet, white  Cucumber, peeled  Green peas, canned  Lettuce  Lima beans  Parsnips  Tomato, 1 small, juice  Turnips  Avocado  Prewitt sprouts  Cauliflower  Cabbage  Mushrooms  Onions  Peas, green  White potato  Radish    Recommended Meat / Meat Substitutes:  Eggs  Cheese  Beef, lamb or pork  Poultry  Fish and shellfish  Sardines    Miscellaneous foods to avoid:  Nuts**  Peanuts, almonds,  pecans, cashews  Chocolate**,  Cocoa,**  Vegetable soup,  Marmalade  Peanut butter    Miscellaneous recommend foods:  Griffiths  Mayonnaise  Salad dressing  Vegetable oils  Butter, margarine  Coconut  Jelly or preserves (made with allowed  fruits)  Lemon, lime juice  Salt, pepper  Cornbread  Sponge cake  Spaghetti, canned in tomato sauce  Rice  Quinoa  All bread    Fruits to avoid:  Currants, red  Dewberries  Grapes, purple  Gooseberries  Lemon peel**  Lime peel**  Orange peel**  Rhubarb**    Recommended fruits:  Apple  Apricots  Cherries, red, sour  Cranberry juice  Grape juice  Orange, fruit and juice  Peaches  Pears  Pineapple, plum, purple  Prunes  Apple juice  Banana  Cherries, merly  Mangos  Melons, cantaloupe, cassava honeydew,  watermelon  Nectarines  Peaches  Pineapple juice  Plums, green or yellow    ** = very high in oxalate     A diet high in salt (sodium) causes calcium to build in your urine. Too much calcium in your urine can lead to new stones. Avoiding highly processed foods like fast food  is the best way to reduce sodium intake. You can use as much table salt as you like.    Calcium is a nutrient that is found in dairy products, such as yogurt, milk and cheese. You need to eat calcium so that it can bind with oxalate in the stomach and intestines before it moves to the kidneys. Eating foods with calcium is a good way for oxalates to leave the body and not form stones. The best way to get calcium into your body is through the foods you eat.     Avoid vitamin C supplements.    Drink enough fluids. The number one thing you can do is to drink enough fluids, like water. Your urine should ideally be a light straw color. Drinking enough fluids will dilute your urine and make it harder for chemicals to form stones.

## 2024-03-19 NOTE — PROGRESS NOTES
Consult conducted via real-time audio/video technology by DARBY Leach CNP from [facility] to the patient from their place of work. Patient consented to this billed visit that was started at 9:43 and completed at 9:50.    Urology Virtual Visit    Name: Eugenia Santoyo    MRN: [id number]   YOB: 1970               Chief Complaint:   Nephrolithiasis          Impression and Plan:   Impression / Plan:   Eugenia Santoyo is a 54 year old female with L ureterolithiasis       -Presumed passage of stone.  -Provided with low oxalate diet.  -Sipping fluids throughout the day to decrease risk of stone formation discussed.  -I mentioned that Crystal Light Lemonade is an excellent source of citrate. Discussed citrate inhibits stone formation by complexing with calcium in the urine, preventing growth and agglomeration of crystals.  -SRP discussed.    Thank you for the opportunity to participate in the care of Eugenia Santoyo.     DARBY Kidd, CNP   Physicians - Department of Urology  [phone]          History of Present Illness:   Eugenia Santoyo is a 54 year old female with history of type 2 diabetes and hypertension who originally presented to the emergency department on 2- with a chief complaint of left-sided abdominal pain and nausea.  Her evaluation in the emergency department revealed a 3 mm stone near the left ureterovesicular junction, as well as a nonobstructing smaller upstream stone at the left ureteropelvic junction.  No prior history of kidney stones.  She was discharged with prescriptions for Zofran, Flomax, Norco, Advil, and Flomax.  She stopped the Flomax before running out of the prescription due to the associated side effect of congestion.  Intermittently strained her urine, has not captured any stones.  Has not had any pain for the past few weeks.  I reviewed her labs from the emergency department.   History is obtained from patient &  EMR    Pertinent imaging reviewed:  CT ABDOMEN/PELVIS WITH CONTRAST February 22, 2024 8:00 AM   Small L UPJ & distal ureteral stone.              Past Medical History:     Past Medical History:   Diagnosis Date    Benign heart murmur     since childhood    Diabetes mellitus, type 2 (H) 4/5/2023    Hypertension     Intermittent asthma     Migraines           Past Surgical History:     Past Surgical History:   Procedure Laterality Date    COLONOSCOPY  03/2024    10 yrs    D & C  01/01/1993    after miscarriage          Social History:     Social History     Tobacco Use    Smoking status: Never    Smokeless tobacco: Never   Substance Use Topics    Alcohol use: Not Currently     Comment: Very rare          Family History:     Family History   Problem Relation Age of Onset    Cancer Mother         CLL--Chronic lymphatic luekemia    Diabetes Mother     Obesity Mother     C.A.D. Father         MI at  ~40yrs old    Melanoma Father         stage 4 - did trial and he is in remission    Diabetes Father     Cancer Maternal Grandmother         breast cancer    Breast Cancer Maternal Grandmother     Breast Cancer Maternal Aunt         3 aunts    Diabetes Sister     Hypertension Sister     Obesity Sister     Breast Cancer Other     Depression Sister     Anxiety Disorder Sister     Obesity Sister           Allergies:     Allergies   Allergen Reactions    Imitrex [Sumatriptan Succinate]      Numbness          Medications:     Current Outpatient Medications   Medication Sig    alcohol swab prep pads Use to swab area of injection/joe as directed.    blood glucose (NO BRAND SPECIFIED) test strip Use to test blood sugar 1 times daily or as directed. To accompany: Blood Glucose Monitor Brands: per insurance.    blood glucose monitoring (NO BRAND SPECIFIED) meter device kit Use to test blood sugar 1 times daily or as directed. Preferred blood glucose meter OR supplies to accompany: Blood Glucose Monitor Brands: per insurance.     "Continuous Blood Gluc  (DEXCOM G6 ) TONI Use to read blood sugars as per 's instructions.    Continuous Blood Gluc Sensor (DEXCOM G6 SENSOR) MISC CHANGE EVERY 10 DAYS.    Continuous Blood Gluc Transmit (DEXCOM G6 TRANSMITTER) MISC CHANGE EVERY 3 MONTHS.    ibuprofen (ADVIL/MOTRIN) 800 MG tablet Take 1 tablet (800 mg) by mouth every 8 hours as needed for moderate pain    irbesartan (AVAPRO) 300 MG tablet Take 1 tablet (300 mg) by mouth daily    MOUNJARO 7.5 MG/0.5ML pen ADMINISTER 7.5 MG UNDER THE SKIN EVERY 7 DAYS    ondansetron (ZOFRAN ODT) 4 MG ODT tab Take 1 tablet (4 mg) by mouth every 8 hours as needed for nausea    thin (NO BRAND SPECIFIED) lancets Use to test blood sugar 1 times daily or as directed. To accompany: Blood Glucose Monitor Brands: per insurance.     No current facility-administered medications for this visit.          Review of Systems:    ROS: See HPI for pertinent details.  Remainder of 10-point ROS negative.         Physical Exam:   VS:  T: Data Unavailable    HR: Data Unavailable    BP: Data Unavailable    RR: Data Unavailable     GEN:  Alert.  NAD.    HEENT:  Sclerae anicteric.    CV:  No obvious jugular venous distension  LUNGS: No respiratory distress, breathing comfortably wo accessory muscle use.  SKIN:  No visible rash  NEURO:  CN grossly intact.          Data:   All laboratory data reviewed:  No results found for: \"PSA\"  Lab Results   Component Value Date    CR 0.84 02/22/2024    CR 0.75 10/09/2023    CR 0.73 04/05/2023    CR 0.79 02/09/2022    CR 0.68 01/20/2021    CR 0.67 12/04/2019    CR 0.73 06/22/2017    CR 0.69 07/09/2014    CR 0.69 07/13/2009     Lab Results   Component Value Date    GFRESTIMATED 81 02/22/2024    GFRESTIMATED >90 10/09/2023    GFRESTIMATED >90 04/05/2023    GFRESTIMATED 88 02/09/2022    GFRESTIMATED >90 01/20/2021    GFRESTIMATED >90 12/04/2019    GFRESTIMATED 84 06/22/2017    GFRESTIMATED >90 07/09/2014    GFRESTIMATED >90 " 07/13/2009     Color Urine (no units)   Date Value   02/22/2024 Straw   06/22/2017 Yellow     Appearance Urine (no units)   Date Value   02/22/2024 Clear   06/22/2017 Clear     Glucose Urine (mg/dL)   Date Value   02/22/2024 Negative   06/22/2017 Negative     Bilirubin Urine (no units)   Date Value   02/22/2024 Negative   06/22/2017 Negative     Ketones Urine (mg/dL)   Date Value   02/22/2024 Negative   06/22/2017 Negative     Specific Gravity Urine (no units)   Date Value   02/22/2024 1.031   06/22/2017 1.025     pH Urine   Date Value   02/22/2024 7.0   06/22/2017 5.5 pH     Protein Albumin Urine (mg/dL)   Date Value   02/22/2024 Negative   06/22/2017 30 (A)     Urobilinogen Urine (EU/dL)   Date Value   06/22/2017 0.2     Nitrite Urine (no units)   Date Value   02/22/2024 Negative   06/22/2017 Negative     Leukocyte Esterase Urine (no units)   Date Value   02/22/2024 Moderate (A)   06/22/2017 Negative

## 2024-03-19 NOTE — LETTER
Lowell Bello,    Thank you for choosing [facility] today for your health care needs. To help make your care experience easy to navigate - we use PALs, or Patient Advocate Liaisons. Your PAL will be your direct connection to your provider and first line of contact. They ll advocate for your needs and help you navigate our services and schedule appointments to help ensure your care is well coordinated.     Expanded care team access with tailored appointment lengths  Depending on your health care needs, you may have longer or shorter appointments and see additional care team providers - including Medication Therapy Management (MTM) pharmacists, diabetes educators, behavioral health clinicians, or social workers. At times, they may be included in your visit with your provider, or you may see them individually.     Navigating community resources   We partner with community resources to help overcome challenges that can make it hard to get health care, including financial hardship, transportation or mobility concerns. Talk to your PAL about any ways we can help connect you to any needed resources.     Online access to your health care records and care team  [employer] is our online tool that makes it easy to see your health care information and communicate with your care team.     [employer] allows you to:   View your health maintenance plan so you know when you re due for a preventive screening  Send secure messages to your care team  View your health history and visit summaries   Schedule appointments   Complete questionnaires and eCheck-in before appointments    Get care from your provider with an e-visit    View and pay your bill     Sign up at [email or web address]. Once you have an account, you also can download the mobile [email or web address].     Make an appointment   A great benefit to having a PAL - you can now connect with them to coordinate your next appointment.  Call your PAL, or schedule in [employer]. When you need care for  minor illness like sinus infections - you can call [address] [place] [address] for a same-day appointment.       Your assigned Arlyn KHAN RN  Patient Advocate Liaison - PAL RN  [facility]  [phone]

## 2024-03-20 RX ORDER — ONDANSETRON 4 MG/1
4 TABLET, ORALLY DISINTEGRATING ORAL EVERY 8 HOURS PRN
Qty: 12 TABLET | Refills: 3 | Status: SHIPPED | OUTPATIENT
Start: 2024-03-20 | End: 2024-04-09

## 2024-04-06 SDOH — HEALTH STABILITY: PHYSICAL HEALTH: ON AVERAGE, HOW MANY DAYS PER WEEK DO YOU ENGAGE IN MODERATE TO STRENUOUS EXERCISE (LIKE A BRISK WALK)?: 3 DAYS

## 2024-04-06 SDOH — HEALTH STABILITY: PHYSICAL HEALTH: ON AVERAGE, HOW MANY MINUTES DO YOU ENGAGE IN EXERCISE AT THIS LEVEL?: 60 MIN

## 2024-04-06 ASSESSMENT — SOCIAL DETERMINANTS OF HEALTH (SDOH)
HOW OFTEN DO YOU GET TOGETHER WITH FRIENDS OR RELATIVES?: ONCE A WEEK
HOW OFTEN DO YOU GET TOGETHER WITH FRIENDS OR RELATIVES?: ONCE A WEEK
IN A TYPICAL WEEK, HOW MANY TIMES DO YOU TALK ON THE PHONE WITH FAMILY, FRIENDS, OR NEIGHBORS?: TWICE A WEEK
HOW OFTEN DO YOU ATTENT MEETINGS OF THE CLUB OR ORGANIZATION YOU BELONG TO?: NEVER
DO YOU BELONG TO ANY CLUBS OR ORGANIZATIONS SUCH AS CHURCH GROUPS UNIONS, FRATERNAL OR ATHLETIC GROUPS, OR SCHOOL GROUPS?: NO
HOW OFTEN DO YOU ATTEND CHURCH OR RELIGIOUS SERVICES?: PATIENT DECLINED

## 2024-04-06 ASSESSMENT — LIFESTYLE VARIABLES
HOW MANY STANDARD DRINKS CONTAINING ALCOHOL DO YOU HAVE ON A TYPICAL DAY: PATIENT DOES NOT DRINK
SKIP TO QUESTIONS 9-10: 1
HOW OFTEN DO YOU HAVE A DRINK CONTAINING ALCOHOL: MONTHLY OR LESS
AUDIT-C TOTAL SCORE: 1
HOW OFTEN DO YOU HAVE SIX OR MORE DRINKS ON ONE OCCASION: NEVER

## 2024-04-09 ENCOUNTER — OFFICE VISIT (OUTPATIENT)
Dept: FAMILY MEDICINE | Facility: CLINIC | Age: 54
End: 2024-04-09
Attending: FAMILY MEDICINE
Payer: COMMERCIAL

## 2024-04-09 VITALS
HEIGHT: 66 IN | DIASTOLIC BLOOD PRESSURE: 86 MMHG | RESPIRATION RATE: 14 BRPM | WEIGHT: 170 LBS | HEART RATE: 83 BPM | SYSTOLIC BLOOD PRESSURE: 128 MMHG | TEMPERATURE: 98.4 F | BODY MASS INDEX: 27.32 KG/M2 | OXYGEN SATURATION: 99 %

## 2024-04-09 DIAGNOSIS — R11.2 NAUSEA AND VOMITING, UNSPECIFIED VOMITING TYPE: ICD-10-CM

## 2024-04-09 DIAGNOSIS — I10 BENIGN ESSENTIAL HYPERTENSION: ICD-10-CM

## 2024-04-09 DIAGNOSIS — N81.11 CYSTOCELE, MIDLINE: ICD-10-CM

## 2024-04-09 DIAGNOSIS — E11.9 TYPE 2 DIABETES MELLITUS WITHOUT COMPLICATION, WITHOUT LONG-TERM CURRENT USE OF INSULIN (H): ICD-10-CM

## 2024-04-09 DIAGNOSIS — Z86.39 HISTORY OF VITAMIN D DEFICIENCY: ICD-10-CM

## 2024-04-09 DIAGNOSIS — J45.20 MILD INTERMITTENT ASTHMA WITHOUT COMPLICATION: ICD-10-CM

## 2024-04-09 DIAGNOSIS — Z00.00 ROUTINE GENERAL MEDICAL EXAMINATION AT A HEALTH CARE FACILITY: Primary | ICD-10-CM

## 2024-04-09 DIAGNOSIS — E66.3 OVERWEIGHT (BMI 25.0-29.9): ICD-10-CM

## 2024-04-09 PROBLEM — E66.811 CLASS 1 OBESITY DUE TO EXCESS CALORIES WITH SERIOUS COMORBIDITY AND BODY MASS INDEX (BMI) OF 32.0 TO 32.9 IN ADULT: Status: RESOLVED | Noted: 2023-07-10 | Resolved: 2024-04-09

## 2024-04-09 PROBLEM — K57.30 DIVERTICULAR DISEASE OF LARGE INTESTINE: Status: ACTIVE | Noted: 2024-03-04

## 2024-04-09 PROBLEM — E66.09 CLASS 1 OBESITY DUE TO EXCESS CALORIES WITH SERIOUS COMORBIDITY AND BODY MASS INDEX (BMI) OF 32.0 TO 32.9 IN ADULT: Status: RESOLVED | Noted: 2023-07-10 | Resolved: 2024-04-09

## 2024-04-09 PROBLEM — K64.9 HEMORRHOIDS: Status: ACTIVE | Noted: 2024-03-04

## 2024-04-09 LAB
CREAT UR-MCNC: 218 MG/DL
HBA1C MFR BLD: 5.2 % (ref 0–5.6)
MICROALBUMIN UR-MCNC: 104 MG/L
MICROALBUMIN/CREAT UR: 47.71 MG/G CR (ref 0–25)
TSH SERPL DL<=0.005 MIU/L-ACNC: 0.48 UIU/ML (ref 0.3–4.2)
VIT D+METAB SERPL-MCNC: 33 NG/ML (ref 20–50)

## 2024-04-09 PROCEDURE — 83036 HEMOGLOBIN GLYCOSYLATED A1C: CPT | Performed by: FAMILY MEDICINE

## 2024-04-09 PROCEDURE — 36415 COLL VENOUS BLD VENIPUNCTURE: CPT | Performed by: FAMILY MEDICINE

## 2024-04-09 PROCEDURE — 87624 HPV HI-RISK TYP POOLED RSLT: CPT | Performed by: FAMILY MEDICINE

## 2024-04-09 PROCEDURE — 82570 ASSAY OF URINE CREATININE: CPT | Performed by: FAMILY MEDICINE

## 2024-04-09 PROCEDURE — 84443 ASSAY THYROID STIM HORMONE: CPT | Performed by: FAMILY MEDICINE

## 2024-04-09 PROCEDURE — 99214 OFFICE O/P EST MOD 30 MIN: CPT | Mod: 25 | Performed by: FAMILY MEDICINE

## 2024-04-09 PROCEDURE — 82043 UR ALBUMIN QUANTITATIVE: CPT | Performed by: FAMILY MEDICINE

## 2024-04-09 PROCEDURE — G0145 SCR C/V CYTO,THINLAYER,RESCR: HCPCS | Performed by: FAMILY MEDICINE

## 2024-04-09 PROCEDURE — 82306 VITAMIN D 25 HYDROXY: CPT | Performed by: FAMILY MEDICINE

## 2024-04-09 PROCEDURE — 99396 PREV VISIT EST AGE 40-64: CPT | Performed by: FAMILY MEDICINE

## 2024-04-09 RX ORDER — PROCHLORPERAZINE 25 MG/1
SUPPOSITORY RECTAL
Qty: 3 EACH | Refills: 5 | Status: SHIPPED | OUTPATIENT
Start: 2024-04-09

## 2024-04-09 RX ORDER — TIRZEPATIDE 7.5 MG/.5ML
INJECTION, SOLUTION SUBCUTANEOUS
Qty: 6 ML | Refills: 1 | Status: SHIPPED | OUTPATIENT
Start: 2024-04-09 | End: 2024-07-22 | Stop reason: DRUGHIGH

## 2024-04-09 RX ORDER — ONDANSETRON 4 MG/1
4 TABLET, ORALLY DISINTEGRATING ORAL EVERY 8 HOURS PRN
Qty: 12 TABLET | Refills: 3 | Status: SHIPPED | OUTPATIENT
Start: 2024-04-09

## 2024-04-09 RX ORDER — TIRZEPATIDE 7.5 MG/.5ML
INJECTION, SOLUTION SUBCUTANEOUS
Qty: 2 ML | Refills: 3 | Status: SHIPPED | OUTPATIENT
Start: 2024-04-09 | End: 2024-04-09

## 2024-04-09 ASSESSMENT — ASTHMA QUESTIONNAIRES
QUESTION_5 LAST FOUR WEEKS HOW WOULD YOU RATE YOUR ASTHMA CONTROL: COMPLETELY CONTROLLED
ACT_TOTALSCORE: 25
QUESTION_1 LAST FOUR WEEKS HOW MUCH OF THE TIME DID YOUR ASTHMA KEEP YOU FROM GETTING AS MUCH DONE AT WORK, SCHOOL OR AT HOME: NONE OF THE TIME
QUESTION_4 LAST FOUR WEEKS HOW OFTEN HAVE YOU USED YOUR RESCUE INHALER OR NEBULIZER MEDICATION (SUCH AS ALBUTEROL): NOT AT ALL
ACT_TOTALSCORE: 25
QUESTION_2 LAST FOUR WEEKS HOW OFTEN HAVE YOU HAD SHORTNESS OF BREATH: NOT AT ALL
QUESTION_3 LAST FOUR WEEKS HOW OFTEN DID YOUR ASTHMA SYMPTOMS (WHEEZING, COUGHING, SHORTNESS OF BREATH, CHEST TIGHTNESS OR PAIN) WAKE YOU UP AT NIGHT OR EARLIER THAN USUAL IN THE MORNING: NOT AT ALL

## 2024-04-09 NOTE — PROGRESS NOTES
Preventive Care Visit  [facility]  Bianca Nathan MD, Family Medicine  Apr 9, 2024      Assessment & Plan     Type 2 diabetes mellitus without complication, without long-term current use of insulin (H)  Under very good control    - PRIMARY CARE FOLLOW-UP SCHEDULING  - REVIEW OF HEALTH MAINTENANCE PROTOCOL ORDERS  - tirzepatide (MOUNJARO) 7.5 MG/0.5ML pen  Dispense: 2 mL; Refill: 3  - Albumin Random Urine Quantitative with Creat Ratio  - TSH with free T4 reflex  - Hemoglobin A1c  - Continuous Blood Gluc Sensor (DEXCOM G6 SENSOR) MISC  Dispense: 3 each; Refill: 5  - TSH with free T4 reflex  - Hemoglobin A1c    Routine general medical examination at a health care facility    - *MA Screening Digital Bilateral  - Pap screen with HPV - recommended age 30 - 65 years    Benign essential hypertension  Under control    Mild intermittent asthma without complication  Under very good control   - Asthma Action Plan (AAP)    Overweight (BMI 25.0-29.9)  Not longer obesity category   Continue good work     Nausea and vomiting, unspecified vomiting type  Only on first day or two of the week on mounjaro   Refills per epicare    - ondansetron (ZOFRAN ODT) 4 MG ODT tab  Dispense: 12 tablet; Refill: 3    Class 1 obesity due to excess calories with serious comorbidity and body mass index (BMI) of 32.0 to 32.9 in adult  No longer applies   - Continuous Blood Gluc Sensor (DEXCOM G6 SENSOR) MISC  Dispense: 3 each; Refill: 5    History of vitamin D deficiency    - Vitamin D Deficiency  - Vitamin D Deficiency    Cystocele, midline  New dx   Handout on the above diagnosis was given to the patient and discussed    - Adult Uro/Gyn  Referral      Patient has been advised of split billing requirements and indicates understanding: Yes    41 minutes spent by me on the date of the encounter doing chart review, history and exam, documentation and further activities per the note      BMI  Estimated body mass index is  "27.44 kg/m  as calculated from the following:    Height as of this encounter: 1.676 m (5' 6\").    Weight as of this encounter: 77.1 kg (170 lb).   Weight management plan: Discussed healthy diet and exercise guidelines    Counseling  Appropriate preventive services were discussed with this patient, including applicable screening as appropriate for fall prevention, nutrition, physical activity, Tobacco-use cessation, weight loss and cognition.  Checklist reviewing preventive services available has been given to the patient.  Reviewed patient's diet, addressing concerns and/or questions.   She is at risk for lack of exercise and has been provided with information to increase physical activity for the benefit of her well-being.       FUTURE APPOINTMENTS:       - Follow-up visit in med check in 6 months   Work on weight loss  Regular exercise  See Patient Instructions    Ministerio Bello is a 54 year old, presenting for the following:  Physical  She is also here for recheck on blood pressure and her diabetes.   Her sugars have been very good and she has lost over 35 lbs since starting.   She is noting that she has some pressure in the vaginal area and wonders if she has prolapse?    The 10-year ASCVD risk score (Dulce MONTALVO, et al., 2019) is: 5.4%    Values used to calculate the score:      Age: 54 years      Sex: Female      Is Non- : No      Diabetic: Yes      Tobacco smoker: No      Systolic Blood Pressure: 128 mmHg      Is BP treated: Yes      HDL Cholesterol: 55 mg/dL      Total Cholesterol: 191 mg/dL       7/10/2023  8:56 AM 7/10/2023  8:58 AM 8/3/2023  12:00 AM 8/14/2023  12:00 AM 8/16/2023  12:00 AM 10/9/2023  12:00 AM   WEIGHT MGMT RESULTS/MEDICATIONS         Weight 203 lbs         Height 5' 6\"         /86 !  138/82        Pulse 74         BMI (Calculated) 32.76            10/9/2023  8:41 AM 11/5/2023  12:00 AM 2/22/2024  5:43 AM 2/22/2024  6:26 AM 2/22/2024  6:56 AM   WEIGHT MGMT " "RESULTS/MEDICATIONS        Weight 187 lbs   170 lbs      Height 5' 6\"   5' 6\"      /85   141/92 (H)  151/93 (H)  156/92 (H)    Pulse 70   73      BMI (Calculated) 30.18   27.44         2/22/2024  8:30 AM 2/22/2024  9:30 AM 2/27/2024  12:00 AM 4/9/2024  7:40 AM   WEIGHT MGMT RESULTS/MEDICATIONS       Weight    170 lbs    Height    5' 6\"    /84  140/83 !   128/86    Pulse 71  69   83    BMI (Calculated)    27.44       Legend:  ! Abnormal  (H) High      4/9/2024     7:34 AM   Additional Questions   Roomed by AT        Health Care Directive  Patient does not have a Health Care Directive or Living Will: previously discussed    Healthy Habits:     Getting at least 3 servings of Calcium per day:  NO    Bi-annual eye exam:  Yes    Dental care twice a year:  Yes    Sleep apnea or symptoms of sleep apnea:  None    Diet:  Diabetic    Frequency of exercise:  2-3 days/week    Duration of exercise:  45-60 minutes    Taking medications regularly:  Yes    Barriers to taking medications:  None    Medication side effects:  Other    Additional concerns today:  No    See above           4/6/2024   General Health   How would you rate your overall physical health? Good   Feel stress (tense, anxious, or unable to sleep) To some extent    To some extent   (!) STRESS CONCERN      4/6/2024   Nutrition   Three or more servings of calcium each day? (!) I DON'T KNOW   Diet: Diabetic   How many servings of fruit and vegetables per day? (!) 2-3   How many sweetened beverages each day? 0-1         4/6/2024   Exercise   Days per week of moderate/strenous exercise 3 days    3 days   Average minutes spent exercising at this level 60 min    60 min         4/6/2024   Social Factors   Frequency of gathering with friends or relatives Once a week    Once a week   Worry food won't last until get money to buy more No    No   Food not last or not have enough money for food? No    No   Do you have housing?  Yes    Yes   Are you worried about " losing your housing? No    No   Lack of transportation? No    No   Unable to get utilities (heat,electricity)? No    No         4/6/2024   Fall Risk   Fallen 2 or more times in the past year? No   Trouble with walking or balance? No          4/6/2024   Dental   Dentist two times every year? Yes         4/6/2024   TB Screening   Were you born outside of the [place]? No           Today's PHQ-2 Score:       4/9/2024     7:38 AM   PHQ-2 ( 1999 [employer])   Q1: Little interest or pleasure in doing things 0   Q2: Feeling down, depressed or hopeless 0   PHQ-2 Score 0   Q1: Little interest or pleasure in doing things Not at all   Q2: Feeling down, depressed or hopeless Not at all   PHQ-2 Score 0         4/6/2024   Substance Use   Frequency of drinking alcohol? Monthly or less   Alcohol more than 3/day or more than 7/wk No   Do you use any other substances recreationally? No     Social History     Tobacco Use    Smoking status: Never    Smokeless tobacco: Never   Vaping Use    Vaping Use: Never used   Substance Use Topics    Alcohol use: Not Currently     Comment: Very rare    Drug use: No           3/16/2023   LAST FHS-7 RESULTS   1st degree relative breast or ovarian cancer No   Any relative bilateral breast cancer No   Any male have breast cancer No   Any ONE woman have BOTH breast AND ovarian cancer No   Any woman with breast cancer before 50yrs Yes   2 or more relatives with breast AND/OR ovarian cancer Yes   2 or more relatives with breast AND/OR bowel cancer No        Mammogram Screening - Mammogram every 1-2 years updated in Health Maintenance based on mutual decision making        4/6/2024   STI Screening   New sexual partner(s) since last STI/HIV test? No     History of abnormal Pap smear: NO - age 30-65 PAP every 5 years with negative HPV co-testing recommended        Latest Ref Rng & Units 12/4/2019     3:38 PM 12/4/2019     2:20 PM 5/11/2016    10:05 AM   PAP / HPV   PAP (Historical)  NIL      HPV 16 DNA NEG^Negative   Negative  Negative    HPV 18 DNA NEG^Negative  Negative  Negative    Other HR HPV NEG^Negative  Negative  Negative      ASCVD Risk   The 10-year ASCVD risk score (Dulce MONTALVO, et al., 2019) is: 5.4%    Values used to calculate the score:      Age: 54 years      Sex: Female      Is Non- : No      Diabetic: Yes      Tobacco smoker: No      Systolic Blood Pressure: 128 mmHg      Is BP treated: Yes      HDL Cholesterol: 55 mg/dL      Total Cholesterol: 191 mg/dL           Reviewed and updated as needed this visit by Provider                    Labs reviewed in EPIC  BP Readings from Last 3 Encounters:   04/09/24 128/86   02/22/24 (!) 140/83   10/09/23 134/85    Wt Readings from Last 3 Encounters:   04/09/24 77.1 kg (170 lb)   02/22/24 77.1 kg (170 lb)   10/09/23 84.8 kg (187 lb)                  Patient Active Problem List   Diagnosis    Fatigue    Heart Murmur, benign    Intermittent asthma    CARDIOVASCULAR SCREENING; LDL GOAL LESS THAN 160    Menstrual migraine without status migrainosus, not intractable    Benign essential hypertension    Diabetes mellitus, type 2 (H)    Class 1 obesity due to excess calories with serious comorbidity and body mass index (BMI) of 32.0 to 32.9 in adult     Past Surgical History:   Procedure Laterality Date    COLONOSCOPY  03/2024    10 yrs    D & C  01/01/1993    after miscarriage       Social History     Tobacco Use    Smoking status: Never    Smokeless tobacco: Never   Substance Use Topics    Alcohol use: Not Currently     Comment: Very rare     Family History   Problem Relation Age of Onset    Cancer Mother         CLL--Chronic lymphatic luekemia    Diabetes Mother     Obesity Mother     C.A.D. Father         MI at  ~40yrs old    Melanoma Father         stage 4 - did trial and he is in remission    Diabetes Father     Cancer Maternal Grandmother         breast cancer    Breast Cancer Maternal Grandmother     Breast Cancer Maternal Aunt         3 aunts  "   Diabetes Sister     Hypertension Sister     Obesity Sister     Breast Cancer Other     Depression Sister     Anxiety Disorder Sister     Obesity Sister          Current Outpatient Medications   Medication Sig Dispense Refill    alcohol swab prep pads Use to swab area of injection/joe as directed. 100 each 3    blood glucose (NO BRAND SPECIFIED) test strip Use to test blood sugar 1 times daily or as directed. To accompany: Blood Glucose Monitor Brands: per insurance. 100 strip 6    blood glucose monitoring (NO BRAND SPECIFIED) meter device kit Use to test blood sugar 1 times daily or as directed. Preferred blood glucose meter OR supplies to accompany: Blood Glucose Monitor Brands: per insurance. 1 kit 0    Continuous Blood Gluc  (DEXCOM G6 ) TONI Use to read blood sugars as per 's instructions. 1 each 0    Continuous Blood Gluc Sensor (DEXCOM G6 SENSOR) MISC CHANGE EVERY 10 DAYS. 3 each 2    Continuous Blood Gluc Transmit (DEXCOM G6 TRANSMITTER) MISC CHANGE EVERY 3 MONTHS. 1 each 1    ibuprofen (ADVIL/MOTRIN) 800 MG tablet Take 1 tablet (800 mg) by mouth every 8 hours as needed for moderate pain 30 tablet 0    irbesartan (AVAPRO) 300 MG tablet Take 1 tablet (300 mg) by mouth daily 90 tablet 3    MOUNJARO 7.5 MG/0.5ML pen ADMINISTER 7.5 MG UNDER THE SKIN EVERY 7 DAYS 2 mL 3    ondansetron (ZOFRAN ODT) 4 MG ODT tab Take 1 tablet (4 mg) by mouth every 8 hours as needed for nausea or vomiting 12 tablet 3    thin (NO BRAND SPECIFIED) lancets Use to test blood sugar 1 times daily or as directed. To accompany: Blood Glucose Monitor Brands: per insurance. 100 each 6         Review of Systems  Constitutional, HEENT, cardiovascular, pulmonary, gi and gu systems are negative, except as otherwise noted.     Objective    Exam  /86 (BP Location: Left arm, Patient Position: Chair, Cuff Size: Adult Regular)   Pulse 83   Temp 98.4  F (36.9  C) (Oral)   Resp 14   Ht 1.676 m (5' 6\")   Wt 77.1 " "kg (170 lb)   LMP 12/01/2019 (Exact Date)   SpO2 99%   BMI 27.44 kg/m     Estimated body mass index is 27.44 kg/m  as calculated from the following:    Height as of this encounter: 1.676 m (5' 6\").    Weight as of this encounter: 77.1 kg (170 lb).    Physical Exam  GENERAL: alert and no distress  EYES: Eyes grossly normal to inspection, PERRL and conjunctivae and sclerae normal  HENT: ear canals and TM's normal, nose and mouth without ulcers or lesions  NECK: no adenopathy, no asymmetry, masses, or scars  RESP: lungs clear to auscultation - no rales, rhonchi or wheezes  BREAST: normal without masses, tenderness or nipple discharge and no palpable axillary masses or adenopathy  CV: regular rate and rhythm, normal S1 S2, no S3 or S4, no murmur, click or rub, no peripheral edema  ABDOMEN: soft, nontender, no hepatosplenomegaly, no masses and bowel sounds normal   (female): normal female external genitalia, normal urethral meatus , normal vaginal mucosa, normal cervix, adnexae, and uterus without masses., and cystocele present  MS: no gross musculoskeletal defects noted, no edema  SKIN: no suspicious lesions or rashes  NEURO: Normal strength and tone, mentation intact and speech normal  PSYCH: mentation appears normal, affect normal/bright  LYMPH: no cervical, supraclavicular, axillary, or inguinal adenopathy        Signed Electronically by: Bianca Nathan MD    " Purse String (Intermediate) Text: Given the location of the defect and the characteristics of the surrounding skin a purse string intermediate closure was deemed most appropriate.  Undermining was performed circumfirentially around the surgical defect.  A purse string suture was then placed and tightened.

## 2024-04-09 NOTE — Clinical Note
This patient of mine cannot find mounjaro anywhere.   If I were to switch to ozempic or trulicity could I go over to the highest dose of the other med without much trouble?

## 2024-04-09 NOTE — PATIENT INSTRUCTIONS
Preventive Care Advice   This is general advice given by our system to help you stay healthy. However, your care team may have specific advice just for you. Please talk to your care team about your preventive care needs.  Nutrition  Eat 5 or more servings of fruits and vegetables each day.  Try wheat bread, brown rice and whole grain pasta (instead of white bread, rice, and pasta).  Get enough calcium and vitamin D. Check the label on foods and aim for 100% of the RDA (recommended daily allowance).  Lifestyle  Exercise at least 150 minutes each week   (30 minutes a day, 5 days a week).  Do muscle strengthening activities 2 days a week. These help control your weight and prevent disease.  No smoking.  Wear sunscreen to prevent skin cancer.  Have a dental exam and cleaning every 6 months.  Yearly exams  See your health care team every year to talk about:  Any changes in your health.  Any medicines your care team has prescribed.  Preventive care, family planning, and ways to prevent chronic diseases.  Shots (vaccines)   HPV shots (up to age 26), if you've never had them before.  Hepatitis B shots (up to age 59), if you've never had them before.  COVID-19 shot: Get this shot when it's due.  Flu shot: Get a flu shot every year.  Tetanus shot: Get a tetanus shot every 10 years.  Pneumococcal, hepatitis A, and RSV shots: Ask your care team if you need these based on your risk.  Shingles shot (for age 50 and up).  General health tests  Diabetes screening:  Starting at age 35, Get screened for diabetes at least every 3 years.  If you are younger than age 35, ask your care team if you should be screened for diabetes.  Cholesterol test: At age 39, start having a cholesterol test every 5 years, or more often if advised.  Bone density scan (DEXA): At age 50, ask your care team if you should have this scan for osteoporosis (brittle bones).  Hepatitis C: Get tested at least once in your life.  STIs (sexually transmitted  infections)  Before age 24: Ask your care team if you should be screened for STIs.  After age 24: Get screened for STIs if you're at risk. You are at risk for STIs (including HIV) if:  You are sexually active with more than one person.  You don't use condoms every time.  You or a partner was diagnosed with a sexually transmitted infection.  If you are at risk for HIV, ask about PrEP medicine to prevent HIV.  Get tested for HIV at least once in your life, whether you are at risk for HIV or not.  Cancer screening tests  Cervical cancer screening: If you have a cervix, begin getting regular cervical cancer screening tests at age 21. Most people who have regular screenings with normal results can stop after age 65. Talk about this with your provider.  Breast cancer scan (mammogram): If you've ever had breasts, begin having regular mammograms starting at age 40. This is a scan to check for breast cancer.  Colon cancer screening: It is important to start screening for colon cancer at age 45.  Have a colonoscopy test every 10 years (or more often if you're at risk) Or, ask your provider about stool tests like a FIT test every year or Cologuard test every 3 years.  To learn more about your testing options, visit: https://www.[employer]/566092.pdf.  For help making a decision, visit: https://bit.ly/cl32726.  Prostate cancer screening test: If you have a prostate and are age 55 to 69, ask your provider if you would benefit from a yearly prostate cancer screening test.  Lung cancer screening: If you are a current or former smoker age 50 to 80, ask your care team if ongoing lung cancer screenings are right for you.  For informational purposes only. Not to replace the advice of your health care provider. Copyright   2023 [employer]. All rights reserved. Clinically reviewed by the [facility]. mCASH 641722 - REV 01/24.    Learning About Stress  What is stress?     Stress is your  body's response to a hard situation. Your body can have a physical, emotional, or mental response. Stress is a fact of life for most people, and it affects everyone differently. What causes stress for you may not be stressful for someone else.  A lot of things can cause stress. You may feel stress when you go on a job interview, take a test, or run a race. This kind of short-term stress is normal and even useful. It can help you if you need to work hard or react quickly. For example, stress can help you finish an important job on time.  Long-term stress is caused by ongoing stressful situations or events. Examples of long-term stress include long-term health problems, ongoing problems at work, or conflicts in your family. Long-term stress can harm your health.  How does stress affect your health?  When you are stressed, your body responds as though you are in danger. It makes hormones that speed up your heart, make you breathe faster, and give you a burst of energy. This is called the fight-or-flight stress response. If the stress is over quickly, your body goes back to normal and no harm is done.  But if stress happens too often or lasts too long, it can have bad effects. Long-term stress can make you more likely to get sick, and it can make symptoms of some diseases worse. If you tense up when you are stressed, you may develop neck, shoulder, or low back pain. Stress is linked to high blood pressure and heart disease.  Stress also harms your emotional health. It can make you monte, tense, or depressed. Your relationships may suffer, and you may not do well at work or school.  What can you do to manage stress?  You can try these things to help manage stress:   Do something active. Exercise or activity can help reduce stress. Walking is a great way to get started. Even everyday activities such as housecleaning or yard work can help.  Try yoga or bari chi. These techniques combine exercise and meditation. You may need  some training at first to learn them.  Do something you enjoy. For example, listen to music or go to a movie. Practice your hobby or do volunteer work.  Meditate. This can help you relax, because you are not worrying about what happened before or what may happen in the future.  Do guided imagery. Imagine yourself in any setting that helps you feel calm. You can use online videos, books, or a teacher to guide you.  Do breathing exercises. For example:  From a standing position, bend forward from the waist with your knees slightly bent. Let your arms dangle close to the floor.  Breathe in slowly and deeply as you return to a standing position. Roll up slowly and lift your head last.  Hold your breath for just a few seconds in the standing position.  Breathe out slowly and bend forward from the waist.  Let your feelings out. Talk, laugh, cry, and express anger when you need to. Talking with supportive friends or family, a counselor, or a cristela leader about your feelings is a healthy way to relieve stress. Avoid discussing your feelings with people who make you feel worse.  Write. It may help to write about things that are bothering you. This helps you find out how much stress you feel and what is causing it. When you know this, you can find better ways to cope.  What can you do to prevent stress?  You might try some of these things to help prevent stress:  Manage your time. This helps you find time to do the things you want and need to do.  Get enough sleep. Your body recovers from the stresses of the day while you are sleeping.  Get support. Your family, friends, and community can make a difference in how you experience stress.  Limit your news feed. Avoid or limit time on social media or news that may make you feel stressed.  Do something active. Exercise or activity can help reduce stress. Walking is a great way to get started.  Where can you learn more?  Go to https://www.healthwise.net/patiented  Enter N032 in the  "search box to learn more about \"Learning About Stress.\"  Current as of: October 24, 2023               Content Version: 14.0    1491-6905 [employer].   Care instructions adapted under license by your healthcare professional. If you have questions about a medical condition or this instruction, always ask your healthcare professional. [employer] disclaims any warranty or liability for your use of this information.  You may schedule your mammogram at [facility] by calling [phone] and asking to schedule your mammogram or you may schedule with [facility] in [place] by calling [phone].   "

## 2024-04-09 NOTE — LETTER
My Asthma Action Plan    Name: Eugenia Santoyo   YOB: 1970  Date: 4/9/2024   My doctor: Bianca Nathan MD   My clinic: [facility]        My Rescue Medicine:   Albuterol inhaler (Proair/Ventolin/Proventil HFA)  2-4 puffs EVERY 4 HOURS as needed. Use a spacer if recommended by your provider.   My Asthma Severity:   Intermittent / Exercise Induced  Know your asthma triggers: upper respiratory infections             GREEN ZONE   Good Control  I feel good  No cough or wheeze  Can work, sleep and play without asthma symptoms       Take your asthma control medicine every day.     If exercise triggers your asthma, take your rescue medication  15 minutes before exercise or sports, and  During exercise if you have asthma symptoms  Spacer to use with inhaler: If you have a spacer, make sure to use it with your inhaler             YELLOW ZONE Getting Worse  I have ANY of these:  I do not feel good  Cough or wheeze  Chest feels tight  Wake up at night   Keep taking your Green Zone medications  Start taking your rescue medicine:  every 20 minutes for up to 1 hour. Then every 4 hours for 24-48 hours.  If you stay in the Yellow Zone for more than 12-24 hours, contact your doctor.  If you do not return to the Green Zone in 12-24 hours or you get worse, start taking your oral steroid medicine if prescribed by your provider.           RED ZONE Medical Alert - Get Help  I have ANY of these:  I feel awful  Medicine is not helping  Breathing getting harder  Trouble walking or talking  Nose opens wide to breathe       Take your rescue medicine NOW  If your provider has prescribed an oral steroid medicine, start taking it NOW  Call your doctor NOW  If you are still in the Red Zone after 20 minutes and you have not reached your doctor:  Take your rescue medicine again and  Call 911 or go to the emergency room right away    See your regular doctor within 2 weeks of an Emergency Room or Urgent Care  visit for follow-up treatment.          Annual Reminders:  Meet with Asthma Educator,  Flu Shot in the Fall, consider Pneumonia Vaccination for patients with asthma (aged 19 and older).    Pharmacy: [id number] [place], [place] - [address] [facility] AT [facility] [address]    Electronically signed by Bianca Nathan MD   Date: 04/09/24                    Asthma Triggers  How To Control Things That Make Your Asthma Worse    Triggers are things that make your asthma worse.  Look at the list below to help you find your triggers and   what you can do about them. You can help prevent asthma flare-ups by staying away from your triggers.      Trigger                                                          What you can do   Cigarette Smoke  Tobacco smoke can make asthma worse. Do not allow smoking in your home, car or around you.  Be sure no one smokes at a child s day care or school.  If you smoke, ask your health care provider for ways to help you quit.  Ask family members to quit too.  Ask your health care provider for a referral to Quit Plan to help you quit smoking, or call 3-627-600-PLAN.     Colds, Flu, Bronchitis  These are common triggers of asthma. Wash your hands often.  Don t touch your eyes, nose or mouth.  Get a flu shot every year.     Dust Mites  These are tiny bugs that live in cloth or carpet. They are too small to see. Wash sheets and blankets in hot water every week.   Encase pillows and mattress in dust mite proof covers.  Avoid having carpet if you can. If you have carpet, vacuum weekly.   Use a dust mask and HEPA vacuum.   Pollen and Outdoor Mold  Some people are allergic to trees, grass, or weed pollen, or molds. Try to keep your windows closed.  Limit time out doors when pollen count is high.   Ask you health care provider about taking medicine during allergy season.     Animal Dander  Some people are allergic to skin flakes, urine or saliva from pets with fur or feathers. Keep  pets with fur or feathers out of your home.    If you can t keep the pet outdoors, then keep the pet out of your bedroom.  Keep the bedroom door closed.  Keep pets off cloth furniture and away from stuffed toys.     Mice, Rats, and Cockroaches  Some people are allergic to the waste from these pests.   Cover food and garbage.  Clean up spills and food crumbs.  Store grease in the refrigerator.   Keep food out of the bedroom.   Indoor Mold  This can be a trigger if your home has high moisture. Fix leaking faucets, pipes, or other sources of water.   Clean moldy surfaces.  Dehumidify basement if it is damp and smelly.   Smoke, Strong Odors, and Sprays  These can reduce air quality. Stay away from strong odors and sprays, such as perfume, powder, hair spray, paints, smoke incense, paint, cleaning products, candles and new carpet.   Exercise or Sports  Some people with asthma have this trigger. Be active!  Ask your doctor about taking medicine before sports or exercise to prevent symptoms.    Warm up for 5-10 minutes before and after sports or exercise.     Other Triggers of Asthma  Cold air:  Cover your nose and mouth with a scarf.  Sometimes laughing or crying can be a trigger.  Some medicines and food can trigger asthma.

## 2024-04-11 LAB
BKR LAB AP GYN ADEQUACY: NORMAL
BKR LAB AP GYN INTERPRETATION: NORMAL
BKR LAB AP HPV REFLEX: NORMAL
BKR LAB AP PREVIOUS ABNORMAL: NORMAL
PATH REPORT.COMMENTS IMP SPEC: NORMAL
PATH REPORT.COMMENTS IMP SPEC: NORMAL
PATH REPORT.RELEVANT HX SPEC: NORMAL

## 2024-04-15 ENCOUNTER — MYC MEDICAL ADVICE (OUTPATIENT)
Dept: FAMILY MEDICINE | Facility: CLINIC | Age: 54
End: 2024-04-15
Payer: COMMERCIAL

## 2024-04-15 LAB
HUMAN PAPILLOMA VIRUS 16 DNA: NEGATIVE
HUMAN PAPILLOMA VIRUS 18 DNA: NEGATIVE
HUMAN PAPILLOMA VIRUS FINAL DIAGNOSIS: NORMAL
HUMAN PAPILLOMA VIRUS OTHER HR: NEGATIVE

## 2024-04-15 NOTE — TELEPHONE ENCOUNTER
See pt Skift message    Routing to [facility] - please fax referral and 4/9/24 OV notes, update pt via Skift     Arlyn SWIFT RN  Patient Advocate Liaison - PAL RN  [facility]  [phone]

## 2024-04-15 NOTE — TELEPHONE ENCOUNTER
Faxed OV notes and referral to: [phone].  Sent Entrustethart message to pt as well.  Deanna Shah, TC

## 2024-04-25 ENCOUNTER — HOSPITAL ENCOUNTER (OUTPATIENT)
Dept: MAMMOGRAPHY | Facility: CLINIC | Age: 54
Discharge: HOME | End: 2024-04-25
Attending: FAMILY MEDICINE | Admitting: FAMILY MEDICINE
Payer: COMMERCIAL

## 2024-04-25 DIAGNOSIS — Z00.00 ROUTINE GENERAL MEDICAL EXAMINATION AT A HEALTH CARE FACILITY: ICD-10-CM

## 2024-04-25 PROCEDURE — 77063 BREAST TOMOSYNTHESIS BI: CPT

## 2024-05-07 ENCOUNTER — MYC MEDICAL ADVICE (OUTPATIENT)
Dept: FAMILY MEDICINE | Facility: CLINIC | Age: 54
End: 2024-05-07
Payer: COMMERCIAL

## 2024-05-07 NOTE — TELEPHONE ENCOUNTER
See my chart, awaiting reply prior to sending to pcp     Ana Laura Jackson, Registered Nurse  [facility]

## 2024-05-07 NOTE — TELEPHONE ENCOUNTER
Dr. Simon   Please see my chart     Patient is unable to find the mounjaro 7.5 mg in stock at any pharmacies     She is asking what recommendations are     Is able to find mounjaro 10 mg, would you like to increase dose?     Continues with a lot of nausea and is using zofran 2-3 times per day     Ana Laura Jackson, Registered Nurse  [facility]

## 2024-05-09 NOTE — TELEPHONE ENCOUNTER
See pt MyChart message  -Confirmed 7.5mg in stock at [facility] pharmacy    PAL replied via [facility]  -Advised to request refill when pt has one pen left to allow time to find stock     Arlyn SWIFT RN  Patient Advocate Liaison - PAL RN  [facility]  [phone]

## 2024-07-07 DIAGNOSIS — I10 BENIGN ESSENTIAL HYPERTENSION: ICD-10-CM

## 2024-07-08 RX ORDER — IRBESARTAN 300 MG/1
300 TABLET ORAL DAILY
Qty: 90 TABLET | Refills: 3 | Status: SHIPPED | OUTPATIENT
Start: 2024-07-08

## 2024-07-22 ENCOUNTER — MYC MEDICAL ADVICE (OUTPATIENT)
Dept: FAMILY MEDICINE | Facility: CLINIC | Age: 54
End: 2024-07-22
Payer: COMMERCIAL

## 2024-07-22 DIAGNOSIS — E11.9 TYPE 2 DIABETES MELLITUS WITHOUT COMPLICATION, WITHOUT LONG-TERM CURRENT USE OF INSULIN (H): Primary | ICD-10-CM

## 2024-07-22 DIAGNOSIS — E66.3 OVERWEIGHT (BMI 25.0-29.9): ICD-10-CM

## 2024-07-22 NOTE — TELEPHONE ENCOUNTER
Dr. Nathan- See pt's Enviviohart message. Please review and advise.     Pended mounjaro 10 mg below. Please review.     Routed to PCP    Vero COYNE RN   PAL (Patient Advocate Liaison)  [facility]

## 2024-07-22 NOTE — TELEPHONE ENCOUNTER
Patient notified via my chart     Ana Laura Jackson, Registered Nurse  [facility]

## 2024-07-23 ENCOUNTER — TELEPHONE (OUTPATIENT)
Dept: FAMILY MEDICINE | Facility: CLINIC | Age: 54
End: 2024-07-23
Payer: COMMERCIAL

## 2024-07-23 NOTE — TELEPHONE ENCOUNTER
Reason for Call:  Appointment Request    Patient requesting this type of appt: Pre-op    Requested provider: Bianca Nathan    Reason patient unable to be scheduled: Not within requested timeframe    When does patient want to be seen/preferred time:  Before 8/12    Comments: Pt has surgery on 8/12 and would like to see PCP for this if she can, there are no openings until 8/19    Could we send this information to you in [facility] or would you prefer to receive a phone call?:   ok to      Call taken on 7/23/2024 at 9:14 AM by Taryn Triplett

## 2024-07-30 ENCOUNTER — OFFICE VISIT (OUTPATIENT)
Dept: FAMILY MEDICINE | Facility: CLINIC | Age: 54
End: 2024-07-30
Payer: COMMERCIAL

## 2024-07-30 VITALS
HEIGHT: 66 IN | SYSTOLIC BLOOD PRESSURE: 120 MMHG | WEIGHT: 161.4 LBS | OXYGEN SATURATION: 99 % | HEART RATE: 65 BPM | RESPIRATION RATE: 14 BRPM | BODY MASS INDEX: 25.94 KG/M2 | DIASTOLIC BLOOD PRESSURE: 83 MMHG | TEMPERATURE: 98 F

## 2024-07-30 DIAGNOSIS — E11.9 TYPE 2 DIABETES MELLITUS WITHOUT COMPLICATION, WITHOUT LONG-TERM CURRENT USE OF INSULIN (H): ICD-10-CM

## 2024-07-30 DIAGNOSIS — Z01.818 PREOP GENERAL PHYSICAL EXAM: Primary | ICD-10-CM

## 2024-07-30 DIAGNOSIS — N81.11 CYSTOCELE, MIDLINE: ICD-10-CM

## 2024-07-30 DIAGNOSIS — N81.6 FEMALE PROCTOCELE WITHOUT UTERINE PROLAPSE: ICD-10-CM

## 2024-07-30 PROBLEM — E66.3 OVERWEIGHT (BMI 25.0-29.9): Status: RESOLVED | Noted: 2024-04-09 | Resolved: 2024-07-30

## 2024-07-30 LAB
HBA1C MFR BLD: 5.3 % (ref 0–5.6)
HGB BLD-MCNC: 13.4 G/DL (ref 11.7–15.7)

## 2024-07-30 PROCEDURE — 99207 PR FOOT EXAM NO CHARGE: CPT | Performed by: FAMILY MEDICINE

## 2024-07-30 PROCEDURE — 83036 HEMOGLOBIN GLYCOSYLATED A1C: CPT | Performed by: FAMILY MEDICINE

## 2024-07-30 PROCEDURE — 99214 OFFICE O/P EST MOD 30 MIN: CPT | Performed by: FAMILY MEDICINE

## 2024-07-30 PROCEDURE — 85018 HEMOGLOBIN: CPT | Performed by: FAMILY MEDICINE

## 2024-07-30 PROCEDURE — 36415 COLL VENOUS BLD VENIPUNCTURE: CPT | Performed by: FAMILY MEDICINE

## 2024-07-30 RX ORDER — ESTRADIOL 0.1 MG/G
1 CREAM VAGINAL
COMMUNITY
Start: 2024-07-31

## 2024-07-30 NOTE — PROGRESS NOTES
Preoperative Evaluation  [facility]  [address] [address]  Phone: [phone]  Primary Provider: Bianca Nathan MD  Pre-op Performing Provider: Bianca Nathan MD  Jul 30, 2024 7/25/2024   Surgical Information   What procedure is being done? Hysterectomy and other   Facility or Hospital where procedure/surgery will be performed: [facility]   Who is doing the procedure / surgery? Dr Taryn Ojeda   Date of surgery / procedure: August 12   Time of surgery / procedure: 7:50 am   Where do you plan to recover after surgery? at home with family        Fax number for surgical facility: Note does not need to be faxed, will be available electronically in Epic.    Assessment & Plan     The proposed surgical procedure is considered INTERMEDIATE risk.    Preop general physical exam  Fit for surgery   - Hemoglobin    Type 2 diabetes mellitus without complication, without long-term current use of insulin (H)  Under control   - HEMOGLOBIN A1C  - FOOT EXAM    Cystocele, midline  Reason for surgery     Female proctocele without uterine prolapse  Reason for surgery               - No identified additional risk factors other than previously addressed    Preoperative Medication Instructions  Antiplatelet or Anticoagulation Medication Instructions   - Patient is on no antiplatelet or anticoagulation medications.    Additional Medication Instructions  Take all scheduled medications on the day of surgery EXCEPT for modifications listed below:   - GLP-1 Injectable (exenitide, liraglutide, semaglutide, dulaglutide, etc.): DO NOT TAKE 7 days before surgery     Recommendation  Approval given to proceed with proposed procedure, without further diagnostic evaluation.    Ministerio Bello is a 54 year old, presenting for the following:  Lesion (Pea size lump under skin mid abdomin)          7/30/2024    10:13 AM   Additional Questions   Roomed by  marcie/marie   Accompanied by self     HPI related to upcoming procedure:         7/25/2024   Pre-Op Questionnaire   Have you ever had a heart attack or stroke? No   Have you ever had surgery on your heart or blood vessels, such as a stent placement, a coronary artery bypass, or surgery on an artery in your head, neck, heart, or legs? No   Do you have chest pain with activity? No   Do you have a history of heart failure? No   Do you currently have a cold, bronchitis or symptoms of other infection? No   Do you have a cough, shortness of breath, or wheezing? No   Do you or anyone in your family have previous history of blood clots? No   Do you or does anyone in your family have a serious bleeding problem such as prolonged bleeding following surgeries or cuts? No   Have you ever had problems with anemia or been told to take iron pills? No   Have you had any abnormal blood loss such as black, tarry or bloody stools, or abnormal vaginal bleeding? No   Have you ever had a blood transfusion? No   Are you willing to have a blood transfusion if it is medically needed before, during, or after your surgery? Yes   Have you or any of your relatives ever had problems with anesthesia? (!) UNKNOWN    Do you have sleep apnea, excessive snoring or daytime drowsiness? No   Do you have any artifical heart valves or other implanted medical devices like a pacemaker, defibrillator, or continuous glucose monitor? No   Do you have artificial joints? No   Are you allergic to latex? No        Health Care Directive  Patient does not have a Health Care Directive or Living Will: Discussed advance care planning with patient; information given to patient to review.    Preoperative Review of    reviewed - controlled substances prescribed by other outside provider(s).          Patient Active Problem List    Diagnosis Date Noted    Overweight (BMI 25.0-29.9) 04/09/2024     Priority: Medium    Diverticular disease of large intestine  03/04/2024     Priority: Medium    Hemorrhoids 03/04/2024     Priority: Medium    Diabetes mellitus, type 2 (H) 04/05/2023     Priority: Medium    Benign essential hypertension 06/26/2017     Priority: Medium    Menstrual migraine without status migrainosus, not intractable 05/11/2016     Priority: Medium    CARDIOVASCULAR SCREENING; LDL GOAL LESS THAN 160 10/31/2010     Priority: Medium    Intermittent asthma 09/22/2009     Priority: Medium    Heart Murmur, benign 09/21/2009     Priority: Medium    Fatigue 07/14/2009     Priority: Medium      Past Medical History:   Diagnosis Date    Benign heart murmur     since childhood    Class 1 obesity due to excess calories with serious comorbidity and body mass index (BMI) of 32.0 to 32.9 in adult     Diabetes mellitus, type 2 (H) 04/05/2023    Hypertension     Intermittent asthma     Migraines      Past Surgical History:   Procedure Laterality Date    COLONOSCOPY  03/2024    ((Pt Qnr Sub: done on april 2024)) 10 yrs    D & C  01/01/1993    after miscarriage     Current Outpatient Medications   Medication Sig Dispense Refill    alcohol swab prep pads Use to swab area of injection/joe as directed. 100 each 3    blood glucose (NO BRAND SPECIFIED) test strip Use to test blood sugar 1 times daily or as directed. To accompany: Blood Glucose Monitor Brands: per insurance. 100 strip 6    blood glucose monitoring (NO BRAND SPECIFIED) meter device kit Use to test blood sugar 1 times daily or as directed. Preferred blood glucose meter OR supplies to accompany: Blood Glucose Monitor Brands: per insurance. 1 kit 0    Continuous Blood Gluc  (DEXCOM G6 ) TONI Use to read blood sugars as per 's instructions. 1 each 0    Continuous Blood Gluc Sensor (DEXCOM G6 SENSOR) MISC Change every 10 days. 3 each 5    Continuous Blood Gluc Transmit (DEXCOM G6 TRANSMITTER) MISC CHANGE EVERY 3 MONTHS. 1 each 1    ibuprofen (ADVIL/MOTRIN) 800 MG tablet Take 1 tablet (800 mg)  "by mouth every 8 hours as needed for moderate pain 30 tablet 0    irbesartan (AVAPRO) 300 MG tablet TAKE 1 TABLET(300 MG) BY MOUTH DAILY 90 tablet 3    ondansetron (ZOFRAN ODT) 4 MG ODT tab Take 1 tablet (4 mg) by mouth every 8 hours as needed for nausea or vomiting 12 tablet 3    thin (NO BRAND SPECIFIED) lancets Use to test blood sugar 1 times daily or as directed. To accompany: Blood Glucose Monitor Brands: per insurance. 100 each 6    tirzepatide (MOUNJARO) 10 MG/0.5ML pen Inject 10 mg subcutaneously every 7 days 2 mL 1       Allergies   Allergen Reactions    Sumatriptan Succinate Nausea and Vomiting        Social History     Tobacco Use    Smoking status: Never    Smokeless tobacco: Never   Substance Use Topics    Alcohol use: Yes     Comment: Very rare     Family History   Problem Relation Age of Onset    Cancer Mother         CLL--Chronic lymphatic luekemia    Diabetes Mother     Obesity Mother     C.A.D. Father         MI at  ~40yrs old    Melanoma Father         stage 4 - did trial and he is in remission    Diabetes Father     Cancer Maternal Grandmother         breast cancer    Breast Cancer Maternal Grandmother     Breast Cancer Maternal Aunt         3 aunts    Diabetes Sister     Hypertension Sister     Obesity Sister     Breast Cancer Other     Depression Sister     Anxiety Disorder Sister     Obesity Sister      History   Drug Use No             Review of Systems  Constitutional, HEENT, cardiovascular, pulmonary, gi and gu systems are negative, except as otherwise noted.    Objective    /83 (BP Location: Left arm, Patient Position: Sitting, Cuff Size: Adult Regular)   Pulse 65   Temp 98  F (36.7  C) (Oral)   Resp 14   Ht 1.737 m (5' 8.4\")   Wt 73.2 kg (161 lb 6.4 oz)   LMP 12/01/2019 (Exact Date)   SpO2 99%   BMI 24.25 kg/m     Estimated body mass index is 24.25 kg/m  as calculated from the following:    Height as of this encounter: 1.737 m (5' 8.4\").    Weight as of this encounter: 73.2 " kg (161 lb 6.4 oz).  Physical Exam  GENERAL: alert and no distress  EYES: Eyes grossly normal to inspection, PERRL and conjunctivae and sclerae normal  HENT: ear canals and TM's normal, nose and mouth without ulcers or lesions  NECK: no adenopathy, no asymmetry, masses, or scars  RESP: lungs clear to auscultation - no rales, rhonchi or wheezes  CV: regular rate and rhythm, normal S1 S2, no S3 or S4, no murmur, click or rub, no peripheral edema  ABDOMEN: soft, nontender, no hepatosplenomegaly, no masses and bowel sounds normal  MS: no gross musculoskeletal defects noted, no edema  SKIN: right upper abdomin with small pea sized subQ lump - nontender and mobile   NEURO: Normal strength and tone, mentation intact and speech normal  PSYCH: mentation appears normal, affect normal/bright  LYMPH: no cervical, supraclavicular, axillary, or inguinal adenopathy    Recent Labs   Lab Test 04/09/24  0844 02/22/24  0600 10/09/23  0940   HGB  --  14.6  --    PLT  --  338  --    NA  --  139 138   POTASSIUM  --  3.7 4.5   CR  --  0.84 0.75   A1C 5.2  --  5.6        Diagnostics  Labs pending at this time.  Results will be reviewed when available.   No EKG required for low risk surgery (cataract, skin procedure, breast biopsy, etc).    Revised Cardiac Risk Index (RCRI)  The patient has the following serious cardiovascular risks for perioperative complications:   - No serious cardiac risks = 0 points     RCRI Interpretation: 0 points: Class I (very low risk - 0.4% complication rate)         Signed Electronically by: Bianca Nathan MD  A copy of this evaluation report is provided to the requesting physician.

## 2024-07-30 NOTE — PATIENT INSTRUCTIONS

## 2024-08-10 NOTE — PHARMACY-ADMISSION MEDICATION HISTORY
Pre-Admission Medication History  Medication history and patient interview completed by pre-admitting RN or pre-op/PACU RN. Reviewed by pharmacist, including [employer] dispense records, [facility] Care Everywhere, and chart review.       David Campos, Pharm.D., [facility]      Last Reviewed by Serg Harrell, RN on 8/2/2024 at 3:05 PM      PTA Med List   Medication Sig Last Dose    alcohol swab prep pads Use to swab area of injection/joe as directed.     Continuous Blood Gluc  (DEXCOM G6 ) TONI Use to read blood sugars as per 's instructions.     Continuous Blood Gluc Sensor (DEXCOM G6 SENSOR) MISC Change every 10 days.     Continuous Blood Gluc Transmit (DEXCOM G6 TRANSMITTER) MISC CHANGE EVERY 3 MONTHS.     estradiol (ESTRACE) 0.1 MG/GM vaginal cream Place 1 g vaginally three times a week     irbesartan (AVAPRO) 300 MG tablet TAKE 1 TABLET(300 MG) BY MOUTH DAILY     ondansetron (ZOFRAN ODT) 4 MG ODT tab Take 1 tablet (4 mg) by mouth every 8 hours as needed for nausea or vomiting     tirzepatide (MOUNJARO) 10 MG/0.5ML pen Inject 10 mg subcutaneously every 7 days 8/2/2024

## 2024-08-12 ENCOUNTER — ANESTHESIA EVENT (OUTPATIENT)
Dept: SURGERY | Facility: CLINIC | Age: 54
End: 2024-08-12
Payer: COMMERCIAL

## 2024-08-12 ENCOUNTER — HOSPITAL ENCOUNTER (OUTPATIENT)
Facility: CLINIC | Age: 54
Discharge: HOME | End: 2024-08-13
Attending: OBSTETRICS & GYNECOLOGY | Admitting: OBSTETRICS & GYNECOLOGY
Payer: COMMERCIAL

## 2024-08-12 ENCOUNTER — ANESTHESIA (OUTPATIENT)
Dept: SURGERY | Facility: CLINIC | Age: 54
End: 2024-08-12
Payer: COMMERCIAL

## 2024-08-12 DIAGNOSIS — Z98.890 POST-OPERATIVE STATE: Primary | ICD-10-CM

## 2024-08-12 LAB
GLUCOSE BLDC GLUCOMTR-MCNC: 124 MG/DL (ref 70–99)
GLUCOSE BLDC GLUCOMTR-MCNC: 126 MG/DL (ref 70–99)
GLUCOSE BLDC GLUCOMTR-MCNC: 126 MG/DL (ref 70–99)
GLUCOSE BLDC GLUCOMTR-MCNC: 93 MG/DL (ref 70–99)

## 2024-08-12 PROCEDURE — 258N000001 HC RX 258: Performed by: OBSTETRICS & GYNECOLOGY

## 2024-08-12 PROCEDURE — 250N000013 HC RX MED GY IP 250 OP 250 PS 637: Performed by: ANESTHESIOLOGY

## 2024-08-12 PROCEDURE — 88302 TISSUE EXAM BY PATHOLOGIST: CPT | Mod: TC | Performed by: OBSTETRICS & GYNECOLOGY

## 2024-08-12 PROCEDURE — 710N000009 HC RECOVERY PHASE 1, LEVEL 1, PER MIN: Performed by: OBSTETRICS & GYNECOLOGY

## 2024-08-12 PROCEDURE — 258N000003 HC RX IP 258 OP 636: Performed by: ANESTHESIOLOGY

## 2024-08-12 PROCEDURE — 82962 GLUCOSE BLOOD TEST: CPT

## 2024-08-12 PROCEDURE — 360N000080 HC SURGERY LEVEL 7, PER MIN: Performed by: OBSTETRICS & GYNECOLOGY

## 2024-08-12 PROCEDURE — 250N000025 HC SEVOFLURANE, PER MIN: Performed by: OBSTETRICS & GYNECOLOGY

## 2024-08-12 PROCEDURE — 250N000009 HC RX 250: Performed by: ANESTHESIOLOGY

## 2024-08-12 PROCEDURE — 88302 TISSUE EXAM BY PATHOLOGIST: CPT | Mod: 26 | Performed by: PATHOLOGY

## 2024-08-12 PROCEDURE — 250N000011 HC RX IP 250 OP 636: Performed by: OBSTETRICS & GYNECOLOGY

## 2024-08-12 PROCEDURE — C1771 REP DEV, URINARY, W/SLING: HCPCS | Performed by: OBSTETRICS & GYNECOLOGY

## 2024-08-12 PROCEDURE — 250N000009 HC RX 250

## 2024-08-12 PROCEDURE — 370N000017 HC ANESTHESIA TECHNICAL FEE, PER MIN: Performed by: OBSTETRICS & GYNECOLOGY

## 2024-08-12 PROCEDURE — 250N000009 HC RX 250: Performed by: OBSTETRICS & GYNECOLOGY

## 2024-08-12 PROCEDURE — 250N000013 HC RX MED GY IP 250 OP 250 PS 637: Performed by: OBSTETRICS & GYNECOLOGY

## 2024-08-12 PROCEDURE — 250N000011 HC RX IP 250 OP 636

## 2024-08-12 PROCEDURE — 272N000001 HC OR GENERAL SUPPLY STERILE: Performed by: OBSTETRICS & GYNECOLOGY

## 2024-08-12 PROCEDURE — C1763 CONN TISS, NON-HUMAN: HCPCS | Performed by: OBSTETRICS & GYNECOLOGY

## 2024-08-12 PROCEDURE — 88305 TISSUE EXAM BY PATHOLOGIST: CPT | Mod: 26 | Performed by: PATHOLOGY

## 2024-08-12 PROCEDURE — 258N000003 HC RX IP 258 OP 636: Performed by: OBSTETRICS & GYNECOLOGY

## 2024-08-12 PROCEDURE — 999N000141 HC STATISTIC PRE-PROCEDURE NURSING ASSESSMENT: Performed by: OBSTETRICS & GYNECOLOGY

## 2024-08-12 DEVICE — TRANSVAGINAL MID-URETHRAL SLING
Type: IMPLANTABLE DEVICE | Site: VAGINA | Status: FUNCTIONAL
Brand: ADVANTAGE FIT™ BLUE SYSTEM

## 2024-08-12 DEVICE — SYNTHETIC MESH
Type: IMPLANTABLE DEVICE | Site: VAGINA | Status: FUNCTIONAL
Brand: POLYFORM™

## 2024-08-12 RX ORDER — ONDANSETRON 4 MG/1
4 TABLET, ORALLY DISINTEGRATING ORAL EVERY 30 MIN PRN
Status: DISCONTINUED | OUTPATIENT
Start: 2024-08-12 | End: 2024-08-12 | Stop reason: HOSPADM

## 2024-08-12 RX ORDER — NALOXONE HYDROCHLORIDE 0.4 MG/ML
0.1 INJECTION, SOLUTION INTRAMUSCULAR; INTRAVENOUS; SUBCUTANEOUS
Status: DISCONTINUED | OUTPATIENT
Start: 2024-08-12 | End: 2024-08-12 | Stop reason: HOSPADM

## 2024-08-12 RX ORDER — FENTANYL CITRATE 50 UG/ML
INJECTION, SOLUTION INTRAMUSCULAR; INTRAVENOUS PRN
Status: DISCONTINUED | OUTPATIENT
Start: 2024-08-12 | End: 2024-08-12

## 2024-08-12 RX ORDER — DEXAMETHASONE SODIUM PHOSPHATE 4 MG/ML
4 INJECTION, SOLUTION INTRA-ARTICULAR; INTRALESIONAL; INTRAMUSCULAR; INTRAVENOUS; SOFT TISSUE
Status: DISCONTINUED | OUTPATIENT
Start: 2024-08-12 | End: 2024-08-12 | Stop reason: HOSPADM

## 2024-08-12 RX ORDER — OXYCODONE HYDROCHLORIDE 5 MG/1
10 TABLET ORAL
Status: DISCONTINUED | OUTPATIENT
Start: 2024-08-12 | End: 2024-08-12 | Stop reason: HOSPADM

## 2024-08-12 RX ORDER — DEXAMETHASONE SODIUM PHOSPHATE 4 MG/ML
INJECTION, SOLUTION INTRA-ARTICULAR; INTRALESIONAL; INTRAMUSCULAR; INTRAVENOUS; SOFT TISSUE PRN
Status: DISCONTINUED | OUTPATIENT
Start: 2024-08-12 | End: 2024-08-12

## 2024-08-12 RX ORDER — GLYCOPYRROLATE 0.2 MG/ML
INJECTION, SOLUTION INTRAMUSCULAR; INTRAVENOUS PRN
Status: DISCONTINUED | OUTPATIENT
Start: 2024-08-12 | End: 2024-08-12

## 2024-08-12 RX ORDER — SODIUM CHLORIDE, SODIUM LACTATE, POTASSIUM CHLORIDE, CALCIUM CHLORIDE 600; 310; 30; 20 MG/100ML; MG/100ML; MG/100ML; MG/100ML
INJECTION, SOLUTION INTRAVENOUS CONTINUOUS
Status: DISCONTINUED | OUTPATIENT
Start: 2024-08-12 | End: 2024-08-12 | Stop reason: HOSPADM

## 2024-08-12 RX ORDER — POLYETHYLENE GLYCOL 3350 17 G/17G
17 POWDER, FOR SOLUTION ORAL DAILY
Status: DISCONTINUED | OUTPATIENT
Start: 2024-08-12 | End: 2024-08-13 | Stop reason: HOSPADM

## 2024-08-12 RX ORDER — NALOXONE HYDROCHLORIDE 0.4 MG/ML
0.2 INJECTION, SOLUTION INTRAMUSCULAR; INTRAVENOUS; SUBCUTANEOUS
Status: DISCONTINUED | OUTPATIENT
Start: 2024-08-12 | End: 2024-08-13 | Stop reason: HOSPADM

## 2024-08-12 RX ORDER — ONDANSETRON 4 MG/1
4 TABLET, ORALLY DISINTEGRATING ORAL EVERY 6 HOURS PRN
Status: DISCONTINUED | OUTPATIENT
Start: 2024-08-12 | End: 2024-08-13 | Stop reason: HOSPADM

## 2024-08-12 RX ORDER — PHENAZOPYRIDINE HYDROCHLORIDE 200 MG/1
200 TABLET, FILM COATED ORAL ONCE
Status: COMPLETED | OUTPATIENT
Start: 2024-08-12 | End: 2024-08-12

## 2024-08-12 RX ORDER — KETOROLAC TROMETHAMINE 30 MG/ML
INJECTION, SOLUTION INTRAMUSCULAR; INTRAVENOUS PRN
Status: DISCONTINUED | OUTPATIENT
Start: 2024-08-12 | End: 2024-08-12

## 2024-08-12 RX ORDER — ACETAMINOPHEN 325 MG/1
975 TABLET ORAL ONCE
Status: COMPLETED | OUTPATIENT
Start: 2024-08-12 | End: 2024-08-12

## 2024-08-12 RX ORDER — PROCHLORPERAZINE MALEATE 10 MG
10 TABLET ORAL EVERY 6 HOURS PRN
Status: DISCONTINUED | OUTPATIENT
Start: 2024-08-12 | End: 2024-08-13 | Stop reason: HOSPADM

## 2024-08-12 RX ORDER — IRBESARTAN 300 MG/1
300 TABLET ORAL DAILY
Status: DISCONTINUED | OUTPATIENT
Start: 2024-08-13 | End: 2024-08-13 | Stop reason: HOSPADM

## 2024-08-12 RX ORDER — HYDROMORPHONE HCL IN WATER/PF 6 MG/30 ML
0.4 PATIENT CONTROLLED ANALGESIA SYRINGE INTRAVENOUS
Status: DISCONTINUED | OUTPATIENT
Start: 2024-08-12 | End: 2024-08-13 | Stop reason: HOSPADM

## 2024-08-12 RX ORDER — ONDANSETRON 2 MG/ML
INJECTION INTRAMUSCULAR; INTRAVENOUS PRN
Status: DISCONTINUED | OUTPATIENT
Start: 2024-08-12 | End: 2024-08-12

## 2024-08-12 RX ORDER — CALCIUM CARBONATE 500 MG/1
500 TABLET, CHEWABLE ORAL DAILY PRN
Status: DISCONTINUED | OUTPATIENT
Start: 2024-08-12 | End: 2024-08-13 | Stop reason: HOSPADM

## 2024-08-12 RX ORDER — ONDANSETRON 2 MG/ML
4 INJECTION INTRAMUSCULAR; INTRAVENOUS EVERY 30 MIN PRN
Status: DISCONTINUED | OUTPATIENT
Start: 2024-08-12 | End: 2024-08-12 | Stop reason: HOSPADM

## 2024-08-12 RX ORDER — HYDROMORPHONE HCL IN WATER/PF 6 MG/30 ML
0.4 PATIENT CONTROLLED ANALGESIA SYRINGE INTRAVENOUS EVERY 5 MIN PRN
Status: DISCONTINUED | OUTPATIENT
Start: 2024-08-12 | End: 2024-08-12 | Stop reason: HOSPADM

## 2024-08-12 RX ORDER — LIDOCAINE HYDROCHLORIDE 20 MG/ML
INJECTION, SOLUTION INFILTRATION; PERINEURAL PRN
Status: DISCONTINUED | OUTPATIENT
Start: 2024-08-12 | End: 2024-08-12

## 2024-08-12 RX ORDER — PROPOFOL 10 MG/ML
INJECTION, EMULSION INTRAVENOUS CONTINUOUS PRN
Status: DISCONTINUED | OUTPATIENT
Start: 2024-08-12 | End: 2024-08-12

## 2024-08-12 RX ORDER — PIPERACILLIN SODIUM, TAZOBACTAM SODIUM 3; .375 G/15ML; G/15ML
3.38 INJECTION, POWDER, LYOPHILIZED, FOR SOLUTION INTRAVENOUS EVERY 6 HOURS
Status: DISCONTINUED | OUTPATIENT
Start: 2024-08-12 | End: 2024-08-13 | Stop reason: HOSPADM

## 2024-08-12 RX ORDER — PROMETHAZINE HYDROCHLORIDE 25 MG/1
25 TABLET ORAL EVERY 6 HOURS PRN
Status: DISCONTINUED | OUTPATIENT
Start: 2024-08-12 | End: 2024-08-13 | Stop reason: HOSPADM

## 2024-08-12 RX ORDER — HYDROMORPHONE HCL IN WATER/PF 6 MG/30 ML
0.2 PATIENT CONTROLLED ANALGESIA SYRINGE INTRAVENOUS EVERY 5 MIN PRN
Status: DISCONTINUED | OUTPATIENT
Start: 2024-08-12 | End: 2024-08-12 | Stop reason: HOSPADM

## 2024-08-12 RX ORDER — KETOROLAC TROMETHAMINE 15 MG/ML
15 INJECTION, SOLUTION INTRAMUSCULAR; INTRAVENOUS
Status: DISCONTINUED | OUTPATIENT
Start: 2024-08-12 | End: 2024-08-12 | Stop reason: HOSPADM

## 2024-08-12 RX ORDER — LIDOCAINE 40 MG/G
CREAM TOPICAL
Status: DISCONTINUED | OUTPATIENT
Start: 2024-08-12 | End: 2024-08-12 | Stop reason: HOSPADM

## 2024-08-12 RX ORDER — PROPOFOL 10 MG/ML
INJECTION, EMULSION INTRAVENOUS PRN
Status: DISCONTINUED | OUTPATIENT
Start: 2024-08-12 | End: 2024-08-12

## 2024-08-12 RX ORDER — NALOXONE HYDROCHLORIDE 0.4 MG/ML
0.4 INJECTION, SOLUTION INTRAMUSCULAR; INTRAVENOUS; SUBCUTANEOUS
Status: DISCONTINUED | OUTPATIENT
Start: 2024-08-12 | End: 2024-08-13 | Stop reason: HOSPADM

## 2024-08-12 RX ORDER — ONDANSETRON 2 MG/ML
4 INJECTION INTRAMUSCULAR; INTRAVENOUS EVERY 6 HOURS PRN
Status: DISCONTINUED | OUTPATIENT
Start: 2024-08-12 | End: 2024-08-13 | Stop reason: HOSPADM

## 2024-08-12 RX ORDER — FENTANYL CITRATE 50 UG/ML
25 INJECTION, SOLUTION INTRAMUSCULAR; INTRAVENOUS EVERY 5 MIN PRN
Status: DISCONTINUED | OUTPATIENT
Start: 2024-08-12 | End: 2024-08-12 | Stop reason: HOSPADM

## 2024-08-12 RX ORDER — CEFAZOLIN SODIUM/WATER 2 G/20 ML
2 SYRINGE (ML) INTRAVENOUS
Status: COMPLETED | OUTPATIENT
Start: 2024-08-12 | End: 2024-08-12

## 2024-08-12 RX ORDER — KETOROLAC TROMETHAMINE 30 MG/ML
30 INJECTION, SOLUTION INTRAMUSCULAR; INTRAVENOUS EVERY 6 HOURS
Status: DISCONTINUED | OUTPATIENT
Start: 2024-08-12 | End: 2024-08-13 | Stop reason: HOSPADM

## 2024-08-12 RX ORDER — CEFAZOLIN SODIUM/WATER 2 G/20 ML
2 SYRINGE (ML) INTRAVENOUS SEE ADMIN INSTRUCTIONS
Status: DISCONTINUED | OUTPATIENT
Start: 2024-08-12 | End: 2024-08-12 | Stop reason: HOSPADM

## 2024-08-12 RX ORDER — HYDROMORPHONE HCL IN WATER/PF 6 MG/30 ML
0.2 PATIENT CONTROLLED ANALGESIA SYRINGE INTRAVENOUS
Status: DISCONTINUED | OUTPATIENT
Start: 2024-08-12 | End: 2024-08-13 | Stop reason: HOSPADM

## 2024-08-12 RX ORDER — HYDROMORPHONE HYDROCHLORIDE 2 MG/1
4 TABLET ORAL EVERY 4 HOURS PRN
Status: DISCONTINUED | OUTPATIENT
Start: 2024-08-12 | End: 2024-08-13 | Stop reason: HOSPADM

## 2024-08-12 RX ORDER — PROCHLORPERAZINE 25 MG
25 SUPPOSITORY, RECTAL RECTAL EVERY 12 HOURS PRN
Status: DISCONTINUED | OUTPATIENT
Start: 2024-08-12 | End: 2024-08-13 | Stop reason: HOSPADM

## 2024-08-12 RX ORDER — SIMETHICONE 80 MG
80 TABLET,CHEWABLE ORAL EVERY 6 HOURS PRN
Status: DISCONTINUED | OUTPATIENT
Start: 2024-08-12 | End: 2024-08-13 | Stop reason: HOSPADM

## 2024-08-12 RX ORDER — ACETAMINOPHEN 325 MG/1
650 TABLET ORAL EVERY 6 HOURS
Status: DISCONTINUED | OUTPATIENT
Start: 2024-08-12 | End: 2024-08-13 | Stop reason: HOSPADM

## 2024-08-12 RX ORDER — FENTANYL CITRATE 50 UG/ML
50 INJECTION, SOLUTION INTRAMUSCULAR; INTRAVENOUS EVERY 5 MIN PRN
Status: DISCONTINUED | OUTPATIENT
Start: 2024-08-12 | End: 2024-08-12 | Stop reason: HOSPADM

## 2024-08-12 RX ORDER — LABETALOL HYDROCHLORIDE 5 MG/ML
10 INJECTION, SOLUTION INTRAVENOUS EVERY 5 MIN PRN
Status: DISCONTINUED | OUTPATIENT
Start: 2024-08-12 | End: 2024-08-12 | Stop reason: HOSPADM

## 2024-08-12 RX ORDER — OXYCODONE HYDROCHLORIDE 5 MG/1
5 TABLET ORAL
Status: COMPLETED | OUTPATIENT
Start: 2024-08-12 | End: 2024-08-12

## 2024-08-12 RX ORDER — HYDROMORPHONE HYDROCHLORIDE 2 MG/1
2 TABLET ORAL EVERY 4 HOURS PRN
Status: DISCONTINUED | OUTPATIENT
Start: 2024-08-12 | End: 2024-08-13 | Stop reason: HOSPADM

## 2024-08-12 RX ORDER — SODIUM CHLORIDE 9 MG/ML
INJECTION, SOLUTION INTRAVENOUS CONTINUOUS
Status: DISCONTINUED | OUTPATIENT
Start: 2024-08-12 | End: 2024-08-13 | Stop reason: HOSPADM

## 2024-08-12 RX ORDER — KETAMINE HYDROCHLORIDE 10 MG/ML
INJECTION INTRAMUSCULAR; INTRAVENOUS PRN
Status: DISCONTINUED | OUTPATIENT
Start: 2024-08-12 | End: 2024-08-12

## 2024-08-12 RX ORDER — SCOLOPAMINE TRANSDERMAL SYSTEM 1 MG/1
1 PATCH, EXTENDED RELEASE TRANSDERMAL ONCE
Status: COMPLETED | OUTPATIENT
Start: 2024-08-12 | End: 2024-08-13

## 2024-08-12 RX ADMIN — SCOPALAMINE 1 PATCH: 1 PATCH, EXTENDED RELEASE TRANSDERMAL at 06:59

## 2024-08-12 RX ADMIN — OXYCODONE HYDROCHLORIDE 5 MG: 5 TABLET ORAL at 13:44

## 2024-08-12 RX ADMIN — FENTANYL CITRATE 100 MCG: 50 INJECTION INTRAMUSCULAR; INTRAVENOUS at 07:32

## 2024-08-12 RX ADMIN — SIMETHICONE 80 MG: 80 TABLET, CHEWABLE ORAL at 20:20

## 2024-08-12 RX ADMIN — PROMETHAZINE HYDROCHLORIDE 25 MG: 25 TABLET ORAL at 21:04

## 2024-08-12 RX ADMIN — HYDROMORPHONE HYDROCHLORIDE 0.2 MG: 0.2 INJECTION, SOLUTION INTRAMUSCULAR; INTRAVENOUS; SUBCUTANEOUS at 15:36

## 2024-08-12 RX ADMIN — SODIUM CHLORIDE, POTASSIUM CHLORIDE, SODIUM LACTATE AND CALCIUM CHLORIDE: 600; 310; 30; 20 INJECTION, SOLUTION INTRAVENOUS at 06:43

## 2024-08-12 RX ADMIN — PROPOFOL 150 MCG/KG/MIN: 10 INJECTION, EMULSION INTRAVENOUS at 07:35

## 2024-08-12 RX ADMIN — FENTANYL CITRATE 50 MCG: 50 INJECTION INTRAMUSCULAR; INTRAVENOUS at 12:19

## 2024-08-12 RX ADMIN — MIDAZOLAM 2 MG: 1 INJECTION INTRAMUSCULAR; INTRAVENOUS at 07:29

## 2024-08-12 RX ADMIN — SUGAMMADEX 200 MG: 100 INJECTION, SOLUTION INTRAVENOUS at 11:52

## 2024-08-12 RX ADMIN — ACETAMINOPHEN 650 MG: 325 TABLET, FILM COATED ORAL at 19:43

## 2024-08-12 RX ADMIN — Medication 2 G: at 07:25

## 2024-08-12 RX ADMIN — FENTANYL CITRATE 50 MCG: 50 INJECTION INTRAMUSCULAR; INTRAVENOUS at 11:55

## 2024-08-12 RX ADMIN — ROCURONIUM BROMIDE 50 MG: 50 INJECTION, SOLUTION INTRAVENOUS at 07:33

## 2024-08-12 RX ADMIN — Medication 2 G: at 11:25

## 2024-08-12 RX ADMIN — PHENAZOPYRIDINE 200 MG: 200 TABLET ORAL at 06:29

## 2024-08-12 RX ADMIN — SODIUM CHLORIDE: 9 INJECTION, SOLUTION INTRAVENOUS at 23:37

## 2024-08-12 RX ADMIN — ONDANSETRON 4 MG: 2 INJECTION INTRAMUSCULAR; INTRAVENOUS at 11:29

## 2024-08-12 RX ADMIN — KETOROLAC TROMETHAMINE 30 MG: 30 INJECTION, SOLUTION INTRAMUSCULAR at 23:36

## 2024-08-12 RX ADMIN — ROCURONIUM BROMIDE 20 MG: 50 INJECTION, SOLUTION INTRAVENOUS at 08:53

## 2024-08-12 RX ADMIN — Medication 20 MG: at 09:34

## 2024-08-12 RX ADMIN — ACETAMINOPHEN 975 MG: 325 TABLET, FILM COATED ORAL at 13:44

## 2024-08-12 RX ADMIN — ROCURONIUM BROMIDE 20 MG: 50 INJECTION, SOLUTION INTRAVENOUS at 10:38

## 2024-08-12 RX ADMIN — ONDANSETRON 4 MG: 2 INJECTION INTRAMUSCULAR; INTRAVENOUS at 21:10

## 2024-08-12 RX ADMIN — PIPERACILLIN AND TAZOBACTAM 3.38 G: 3; .375 INJECTION, POWDER, FOR SOLUTION INTRAVENOUS at 17:46

## 2024-08-12 RX ADMIN — Medication 30 MG: at 07:46

## 2024-08-12 RX ADMIN — KETOROLAC TROMETHAMINE 30 MG: 30 INJECTION, SOLUTION INTRAMUSCULAR at 11:52

## 2024-08-12 RX ADMIN — ACETAMINOPHEN 975 MG: 325 TABLET, FILM COATED ORAL at 06:30

## 2024-08-12 RX ADMIN — HYDROMORPHONE HYDROCHLORIDE 1 MG: 1 INJECTION, SOLUTION INTRAMUSCULAR; INTRAVENOUS; SUBCUTANEOUS at 07:44

## 2024-08-12 RX ADMIN — SODIUM CHLORIDE: 9 INJECTION, SOLUTION INTRAVENOUS at 15:16

## 2024-08-12 RX ADMIN — PROPOFOL 180 MG: 10 INJECTION, EMULSION INTRAVENOUS at 07:32

## 2024-08-12 RX ADMIN — POLYETHYLENE GLYCOL 3350 17 G: 17 POWDER, FOR SOLUTION ORAL at 20:20

## 2024-08-12 RX ADMIN — GLYCOPYRROLATE 0.2 MG: 0.2 INJECTION, SOLUTION INTRAMUSCULAR; INTRAVENOUS at 07:45

## 2024-08-12 RX ADMIN — PIPERACILLIN AND TAZOBACTAM 3.38 G: 3; .375 INJECTION, POWDER, FOR SOLUTION INTRAVENOUS at 23:36

## 2024-08-12 RX ADMIN — DEXAMETHASONE SODIUM PHOSPHATE 8 MG: 4 INJECTION, SOLUTION INTRA-ARTICULAR; INTRALESIONAL; INTRAMUSCULAR; INTRAVENOUS; SOFT TISSUE at 07:33

## 2024-08-12 RX ADMIN — CALCIUM CARBONATE (ANTACID) CHEW TAB 500 MG 500 MG: 500 CHEW TAB at 17:46

## 2024-08-12 RX ADMIN — KETOROLAC TROMETHAMINE 30 MG: 30 INJECTION, SOLUTION INTRAMUSCULAR at 17:43

## 2024-08-12 RX ADMIN — LIDOCAINE HYDROCHLORIDE 50 MG: 20 INJECTION, SOLUTION INFILTRATION; PERINEURAL at 07:32

## 2024-08-12 ASSESSMENT — ACTIVITIES OF DAILY LIVING (ADL)
ADLS_ACUITY_SCORE: 25
ADLS_ACUITY_SCORE: 20
ADLS_ACUITY_SCORE: 25
ADLS_ACUITY_SCORE: 20
ADLS_ACUITY_SCORE: 20
ADLS_ACUITY_SCORE: 25
ADLS_ACUITY_SCORE: 20
ADLS_ACUITY_SCORE: 25
ADLS_ACUITY_SCORE: 20
ADLS_ACUITY_SCORE: 25
ADLS_ACUITY_SCORE: 25
ADLS_ACUITY_SCORE: 20

## 2024-08-12 NOTE — ANESTHESIA PROCEDURE NOTES
Airway       Patient location during procedure: OR       Procedure Start/Stop Times: 8/12/2024 7:36 AM  Staff -        CRNA: Anali Puckett APRN CRNA       Performed By: CRNA  Consent for Airway        Urgency: elective  Indications and Patient Condition       Indications for airway management: mehul-procedural       Induction type:intravenous       Mask difficulty assessment: 1 - vent by mask    Final Airway Details       Final airway type: endotracheal airway       Successful airway: ETT - single  Endotracheal Airway Details        ETT size (mm): 7.0       Cuffed: yes       Successful intubation technique: direct laryngoscopy       Grade View of Cords: 1       Adjucts: stylet       Position: Right       Measured from: gums/teeth       Secured at (cm): 21       Bite block used: None    Post intubation assessment        Placement verified by: capnometry, equal breath sounds and chest rise        Number of attempts at approach: 1       Number of other approaches attempted: 0       Secured with: plastic tape       Ease of procedure: easy       Dentition: Unchanged    Medication(s) Administered   Medication Administration Time: 8/12/2024 7:36 AM

## 2024-08-12 NOTE — PLAN OF CARE
Goal Outcome Evaluation:      Plan of Care Reviewed With: patient    Overall Patient Progress: improving    Outcome Evaluation: Pt progressing on care plan goals    Pt vitally stable this shift, has had a few mildly elevated BPS, but pt has a hx of HTN. Capno monitoring on, pt on room air. Pain well controlled with toradol and 1 dose of IV dilaudid this shift. Using ice for incisional discomfort. Pt has been slightly nauseous but has not required any medication. Able to tolerate clear liquids and a few crackers this evening. Took one dose of tums for heartburn. Receiving IV antibiotics for infection prophylaxis. Urine output adequate, cat in place. Vaginal packing in place, scant drainage on peripad. Pt has not been out of bed yet. Pts blood sugars stable, checking before meals and at bedtime. Incisions clean dry and intact. Family present at bedside. Will continue to monitor and treat.

## 2024-08-12 NOTE — ANESTHESIA PREPROCEDURE EVALUATION
Anesthesia Pre-Procedure Evaluation    Patient: Eugenia Santoyo   MRN: [id number] : 1970        Procedure : Procedure(s):  Robotic Laparoscopic Sacral Colpopexy, Robotic Laparoscopic Supracervical Hysterectomy with Bilateral Salpingectomy, Robotic Laparoscopic Abdominal Enterocele Repair,  Cystoscopy, Possible Midurethral Sling, Bilateral Oophorectomy and Posterior Repair          Past Medical History:   Diagnosis Date    Benign heart murmur     since childhood    Class 1 obesity due to excess calories with serious comorbidity and body mass index (BMI) of 32.0 to 32.9 in adult     Complication of anesthesia     Diabetes mellitus, type 2 (H) 2023    Hypertension     Intermittent asthma     Migraines     PONV (postoperative nausea and vomiting)       Past Surgical History:   Procedure Laterality Date    COLONOSCOPY  2024    ((Pt Qnr Sub: done on 2024)) 10 yrs    D & C  1993    after miscarriage      Allergies   Allergen Reactions    Sumatriptan Succinate Nausea and Vomiting      Social History     Tobacco Use    Smoking status: Never    Smokeless tobacco: Never   Substance Use Topics    Alcohol use: Yes     Comment: Very rare      Wt Readings from Last 1 Encounters:   24 72.9 kg (160 lb 12.8 oz)        Anesthesia Evaluation   Pt has had prior anesthetic.     History of anesthetic complications  - PONV.      ROS/MED HX  ENT/Pulmonary:     (+)                     Intermittent, asthma  Treatment: Inhaler prn,                 Neurologic:       Cardiovascular:     (+)  hypertension- -   -  - -                                      METS/Exercise Tolerance:     Hematologic:       Musculoskeletal:       GI/Hepatic:       Renal/Genitourinary:       Endo:     (+)  type II DM,             Obesity,       Psychiatric/Substance Use:       Infectious Disease:       Malignancy:       Other:            Physical Exam    Airway        Mallampati: II   TM distance: > 3 FB   Neck ROM: full   Mouth opening:  "> 3 cm    Respiratory Devices and Support         Dental       (+) Completely normal teeth      Cardiovascular   cardiovascular exam normal       Rhythm and rate: regular and normal     Pulmonary   pulmonary exam normal        breath sounds clear to auscultation           OUTSIDE LABS:  CBC:   Lab Results   Component Value Date    WBC 8.8 02/22/2024    WBC 9.2 09/21/2018    HGB 13.4 07/30/2024    HGB 14.6 02/22/2024    HCT 42.6 02/22/2024    HCT 35.6 09/21/2018     02/22/2024     09/21/2018     BMP:   Lab Results   Component Value Date     02/22/2024     10/09/2023    POTASSIUM 3.7 02/22/2024    POTASSIUM 4.5 10/09/2023    CHLORIDE 104 02/22/2024    CHLORIDE 103 10/09/2023    CO2 21 (L) 02/22/2024    CO2 24 10/09/2023    BUN 14.3 02/22/2024    BUN 12.9 10/09/2023    CR 0.84 02/22/2024    CR 0.75 10/09/2023    GLC 93 08/12/2024     (H) 02/22/2024     COAGS: No results found for: \"PTT\", \"INR\", \"FIBR\"  POC: No results found for: \"BGM\", \"HCG\", \"HCGS\"  HEPATIC:   Lab Results   Component Value Date    ALBUMIN 4.6 02/22/2024    PROTTOTAL 7.8 02/22/2024    ALT 17 02/22/2024    AST 19 02/22/2024    ALKPHOS 89 02/22/2024    BILITOTAL 0.6 02/22/2024     OTHER:   Lab Results   Component Value Date    A1C 5.3 07/30/2024    ARTUR 9.9 02/22/2024    MAG 2.0 02/22/2024    LIPASE 142 (H) 02/22/2024    TSH 0.48 04/09/2024    T4 1.07 12/16/2002    SED 13 07/13/2009       Anesthesia Plan    ASA Status:  2    NPO Status:  NPO Appropriate    Anesthesia Type: General.     - Airway: ETT   Induction: Intravenous.   Maintenance: Balanced.   Techniques and Equipment:     - Lines/Monitors: 2nd IV     Consents    Anesthesia Plan(s) and associated risks, benefits, and realistic alternatives discussed. Questions answered and patient/representative(s) expressed understanding.     - Discussed: Risks, Benefits and Alternatives for the PROCEDURE were discussed     - Discussed with:  Patient       Use of blood products " "discussed: Yes.     - Discussed with: Patient.     - Consented: consented to blood products            Reason for refusal: other.     Postoperative Care    Pain management: IV analgesics, Oral pain medications.   PONV prophylaxis: Ondansetron (or other 5HT-3), Dexamethasone or Solumedrol, Background Propofol Infusion, Scopolamine patch     Comments:    Other Comments: PONV, propofol as primary antiemetic.           Chanel Ragsdale MD    I have reviewed the pertinent notes and labs in the chart from the past 30 days and (re)examined the patient.  Any updates or changes from those notes are reflected in this note.              # Overweight: Estimated body mass index is 25.95 kg/m  as calculated from the following:    Height as of 7/30/24: 1.676 m (5' 6\").    Weight as of this encounter: 72.9 kg (160 lb 12.8 oz).      "

## 2024-08-12 NOTE — ANESTHESIA CARE TRANSFER NOTE
Patient: Eugenia Santoyo    Procedure: Procedure(s):  Robotic Laparoscopic Sacral Colpopexy, Robotic Laparoscopic Supracervical Hysterectomy with Bilateral Salpingectomy, Robotic Laparoscopic Abdominal Enterocele Repair, Lysis of Adhesions, Umbilical Hernia Repair  Cystoscopy, Possible Midurethral Sling, Bilateral Oophorectomy and Posterior Repair       Diagnosis: Uterovaginal prolapse [N81.4]  Enterocele [K46.9]  Female stress incontinence [N39.3]  Diagnosis Additional Information: No value filed.    Anesthesia Type:   General     Note:    Oropharynx: oral airway in place  Level of Consciousness: drowsy  Oxygen Supplementation: face mask  Level of Supplemental Oxygen (L/min / FiO2): 6  Independent Airway: airway patency satisfactory and stable  Dentition: dentition unchanged  Vital Signs Stable: post-procedure vital signs reviewed and stable  Report to RN Given: handoff report given  Patient transferred to: PACU    Handoff Report: Identifed the Patient, Identified the Reponsible Provider, Reviewed the pertinent medical history, Discussed the surgical course, Reviewed Intra-OP anesthesia mangement and issues during anesthesia, Set expectations for post-procedure period and Allowed opportunity for questions and acknowledgement of understanding      Vitals:  Vitals Value Taken Time   BP     Temp 97.5  F (36.4  C) 08/12/24 1225   Pulse 67 08/12/24 1225   Resp 10 08/12/24 1225   SpO2 97 % 08/12/24 1225   Vitals shown include unfiled device data.    Electronically Signed By: DARBY Tripp CRNA  August 12, 2024  12:26 PM

## 2024-08-12 NOTE — OP NOTE
OPERATIVE REPORT    NAME: Eugenia Santoyo  MR#: [id number]  : 1970    DATE OF OPERATION: 2024    SURGEON: Taryn Ojeda MD    PREOPERATIVE DIAGNOSES:  1. Incomplete uterovaginal prolapse. (POP-Q Ba +5, Ap -1, C +6, TVL 8)   2. Associated cystocele, rectocele and enterocele  3. Stress urinary incontinence with ISD    POSTOPERATIVE DIAGNOSES:  1. Incomplete uterovaginal prolapse. (POP-Q Ba +5, Ap -1, C +6, TVL 8)   2. Associated cystocele, rectocele and enterocele  3. Stress urinary incontinence with ISD  4. Umbilical hernia  5. Extensive pelvic scarring/adhesions    PROCEDURE:  1. Da Lucía laparoscopic sacral colpopexy  2. Da Lucía laparoscopic supracervical hysterectomy  3. Da Lucía laparoscopic bilateral salpingectomy  4. Da Lucía laparoscopic abdominal anterior, posterior and  enterocele repairs  5. Da Lucía laparoscopic lysis of adhesions, extensive 120 minutes  6. Umbilical hernia repair  7. Retropubic midurethral sling  8. Cystourethroscopy    **Due to the extensive pelvic organ prolapse, the severity of pelvic scarring/adhesions, this case required an addition 2 hours over a typical case.    ASSISTANT:  A skilled first assistant, WESLEY Richey, was necessary for this procedure for assistance with patient positioning, prepping, draping, surgical visualization, performance of the repairs, wound closure and dressing application. The assistant was present for the entire procedure.    ANESTHESIA:  General endotracheal.    ESTIMATED BLOOD LOSS:  150 ml    IV FLUIDS:  2000 ml crystalloid    FINDINGS:  1. The bladder was found to be free of lesion. Ureters were in their normal anatomic positions, were noted to be patent via administration of dye.  2. The ovaries were normal appearing, left in-situ per surgical plan.  3. Normal anorectal examination at the conclusion of the procedure.  4. Extensive dense adhesions of the right deep pelvis to the sigmoid colon, right side wall, upper posterior  vaginal wall, and inferior right adnexa.    DRAINS:  16-Dominican Butler catheter to gravity drainage.    PACKING:  Saline-soaked vaginal packing placed.    COMPLICATIONS:  None.    INDICATIONS :  This patient was seen in consultation regarding uterovaginal prolapse and urinary incontinence . Please refer to her clinic documentation for a complete description of her evaluation and treatment plan. She was desirous of a definitive surgical approach. Prior to the procedure the risks, benefits, indications, and alternatives were discussed. Written and verbal consent were obtained.    PROCEDURE IN DETAIL:  The patient was brought to the operating suite. She was administered prophylactic IV antibiotics, had sequential compression devices present and functioning on her lower extremities.    She was placed in a supine position, administered general endotracheal anesthesia without complication. She was now carefully positioned in the dorsal lithotomy position with her legs carefully stationed in Yellofin stirrups, with attention to avoiding pressure points. An exam under anesthesia was performed with noted relaxation, no adnexal or parametrial masses, normal anorectal exam. She was now sterilely prepped and draped both abdominally and vaginally, and an 16-Dominican Butler catheter was placed to gravity drainage.    Laparoscopic entry:  At the base of the umbilicus, a skin incision was created. The incision was extended to 25- 30 mm and a dissection was performed down to the peritoneum and entry into the abdominal cavity was achieved. The gelpoint retractor/trocar parker was inserted. Inspection of the intra-abdominal cavity showed there to be no lesions. She was placed in steep Trendelenburg position and 3 additional laparoscopic ports were placed, 8 mm far right quadrant, 8 mm mid left quadrant, and 8 mm far left quadrant. The da Lucía robotic arms were brought to the patient's bedside and operative control was assumed at the  console.    Bilateral Salpingectomy:  The right fallopian tube was grasped, the mesosalpinx was cauterized mobilizing and excising the specimen. This was repeated on the left side in a similar fashion. The specimens were removed from the abdominal cavity.    Supracervical hysterectomy:  The left round ligament was grasped, cauterized, and incised. The anterior broad ligament was then scored opened. The utero-ovarian pedicle was now cauterized and incised. The broad ligament was further dissected using cautery. The uterine vessels were visualized and cauterized. Anteriorly, a peritoneal bladder flap had been developed transversely and dissected down past the external cervical os. This procedure was then repeated in a similar fashion on the patient's right side. At this point, the specimen was truncated from the residual cervix leaving 1-2 cm of residual cervical tissue. The specimen was tagged for later removal.    Lysis of Adhesions:  The right pelvic side wall was densely adhesed to the sigmoid colon and upper posterior vaginal wall. In order to release this dense ball of adhesed tissue, the peritoneum was opened on the left deep pelvis. The mehul-rectal space was carefully dissected open. An EEA sizer and various vaginal probes were used to delineate the tissue planes. The right ureter was identified along the course, it was involved in the scarred area at the very apex of the mass. It was lateral and superior to the area of dissection. Using slow tedious dissection, the tissue plans were identified and the scar band of tissue was incised. This allowed the complete access to the pelvic cavity, and assurred safe handling /dissection of the sigmoid, ureter, rectum and deep pelvic structures.    Sacral colpopexy:  A Lucite probe was placed within the vaginal canal. Anteriorly, the bladder was dissected down the anterior vaginal muscularis approximately 9 cm sagitally. Posteriorly, the peritoneum was dissected off the  posterior rectovaginal septum and dissected down to the rectovaginal septum, approximately 11 cm sagitally, as close to the perineal body as possible.    Sacral dissection:  At the sacral promontory the right ureter was noted to be lateral to the area of dissection. The peritoneum was elevated and incised. The peritoneal incision was taken down around the pelvic curvature to meet up with the posterior vaginal dissection. The peritoneal edges were carefully mobilized for future closure. At the sacral promontory the connective tissue was dissected down to the anterior longitudinal ligament. The middle sacral vessel was cauterized.    Mesh Attachment.  A 5 cm x 15 cm piece of [employer] Polyform polypropylene mesh was attached to the anterior vaginal muscularis using approximately 9 interrupted sutures of 2-0 PDS. An identical sheet of mesh was attached to the posterior vaginal wall using approximately 9 interrupted sutures of 2-0 PDS. The long arms of the mesh were now brought to the sacral promontory and attached to the anterior longitudinal ligament using 3 interrupted sutures of 0 [place]. Appropriate tensioning was ascertained using visual and palpating clues. Redundant longitudinal mesh was trimmed and the resultant peritoneum was closed with monocryl suture, thus retroperitonealizing the mesh repair.    Abdominal Enterocele Repair  Using a Halban technique, the deep pelvis was closed/obliterated using 2-0 PDS suture, imbricating the peritoneal tissue.    Cystourethroscopy was now performed, which noted patent ureters bilaterally and normal appearing bladder.    Specimen removal:  The uterine/adnexal specimen was now removed from the abdominal cavity through the umbilical incision using an endo-catch bag.    Umbilical Hernia Repair:  The umbilical fascia was identified and grasped, the skin was gently dissected laterally to  allow mobilization and tension-free approximation of the fascial layer. The fat  containing peritoneal sac was excised using cautery. The fascia was sutured at the midline with 0-PDS suture. Palpation after closure showed resolution of the defect. The subcutaneous tissue was re-approximated with 0-vicryl sutures. The skin was closed with a subcuticular 4-0 vicryl suture. Skin glue was applied over the incision line.    Laparoscopic closure:  The CO2 gas was allowed to escape from the patient's abdomen, and the resultant 4 skin incisions were closed with a subcuticular suture of 4-0 vicryl and skin glue was applied.    Retropubic midurethral sling:  Two marks were created on the anterior abdominal wall 2.5 cm lateral to midline at the level of the pubic bone. Attention was turned to the vagina, where an Allis clamp was applied 1 cm distal from the meatus, an additional Allis clamp 2.5 cm from the meatus. Periurethral tissue was injected with a solution of Marcaine with epinephrine. A 1.5 cm incision was created between the 2 Allis clamps beneath the mid urethra in the sagittal plane. Two periurethral tunnels were then created out laterally towards the pubic bone. Once an adequate dissection had been performed, the [employer] Advantage Fit device was selected and loaded. Trocar was brought through the patient's left periurethral tunnel back behind the pubic bone, through the space of Retzius, and out through the previously created carmen on the anterior abdominal wall. The blue stay sheath was left in place.    This was repeated in a similar fashion on the patient's right side. The urethra was diverted in ipsilateral fashion during trocar placement. Cystourethroscopy was now performed with the above noted normal findings. The cystoscope was removed. The sling material was appropriately positioned beneath the mid urethra with no overt tension. The resultant incision was closed with a running locking suture of 2-0 Vicryl. I narrowed the anterior wall at the bladder neck using plicating sutures.  Excess sling material on the anterior abdominal wall was trimmed. The resultant incisions were closed with Dermabond.    The vagina was packed with a saline-soaked vaginal packing. She had a 16-Slovak Butler catheter present to gravity drainage. Sponge, lap, and needle counts were found to be correct. There were no complications from surgery. Patient was awoken from anesthesia, and brought to the recovery room in excellent condition.    Taryn Ojeda MD

## 2024-08-12 NOTE — ANESTHESIA POSTPROCEDURE EVALUATION
Patient: Eugenia Santoyo    Procedure: Procedure(s):  Robotic Laparoscopic Sacral Colpopexy, Robotic Laparoscopic Supracervical Hysterectomy with Bilateral Salpingectomy, Robotic Laparoscopic Abdominal Enterocele Repair, Lysis of Adhesions, Umbilical Hernia Repair  Cystoscopy, Midurethral Sling       Anesthesia Type:  General    Note:  Disposition: Admission   Postop Pain Control: Uneventful            Sign Out: Well controlled pain   PONV: No   Neuro/Psych: Uneventful            Sign Out: Acceptable/Baseline neuro status   Airway/Respiratory: Uneventful            Sign Out: Acceptable/Baseline resp. status   CV/Hemodynamics: Uneventful            Sign Out: Acceptable CV status; No obvious hypovolemia; No obvious fluid overload   Other NRE: NONE   DID A NON-ROUTINE EVENT OCCUR? No           Last vitals:  Vitals Value Taken Time   /87 08/12/24 1330   Temp 97.5  F (36.4  C) 08/12/24 1225   Pulse 79 08/12/24 1331   Resp 8 08/12/24 1331   SpO2 97 % 08/12/24 1331   Vitals shown include unfiled device data.    Electronically Signed By: Chanel Ragsdale MD  August 12, 2024  1:32 PM

## 2024-08-13 VITALS
RESPIRATION RATE: 17 BRPM | BODY MASS INDEX: 25.95 KG/M2 | WEIGHT: 160.8 LBS | TEMPERATURE: 98.1 F | OXYGEN SATURATION: 97 % | DIASTOLIC BLOOD PRESSURE: 94 MMHG | SYSTOLIC BLOOD PRESSURE: 132 MMHG | HEART RATE: 73 BPM

## 2024-08-13 LAB
CREAT SERPL-MCNC: 1.02 MG/DL (ref 0.51–0.95)
EGFRCR SERPLBLD CKD-EPI 2021: 64 ML/MIN/1.73M2
GLUCOSE SERPL-MCNC: 118 MG/DL (ref 70–99)
HGB BLD-MCNC: 10.4 G/DL (ref 11.7–15.7)

## 2024-08-13 PROCEDURE — 36415 COLL VENOUS BLD VENIPUNCTURE: CPT | Performed by: OBSTETRICS & GYNECOLOGY

## 2024-08-13 PROCEDURE — 82565 ASSAY OF CREATININE: CPT | Performed by: OBSTETRICS & GYNECOLOGY

## 2024-08-13 PROCEDURE — 85018 HEMOGLOBIN: CPT | Performed by: OBSTETRICS & GYNECOLOGY

## 2024-08-13 PROCEDURE — 82947 ASSAY GLUCOSE BLOOD QUANT: CPT | Performed by: OBSTETRICS & GYNECOLOGY

## 2024-08-13 PROCEDURE — 250N000011 HC RX IP 250 OP 636: Performed by: OBSTETRICS & GYNECOLOGY

## 2024-08-13 PROCEDURE — 250N000013 HC RX MED GY IP 250 OP 250 PS 637: Performed by: OBSTETRICS & GYNECOLOGY

## 2024-08-13 RX ORDER — HYDROMORPHONE HYDROCHLORIDE 2 MG/1
2 TABLET ORAL EVERY 6 HOURS PRN
Qty: 20 TABLET | Refills: 0 | Status: SHIPPED | OUTPATIENT
Start: 2024-08-13 | End: 2024-08-16

## 2024-08-13 RX ORDER — CIPROFLOXACIN 500 MG/1
500 TABLET, FILM COATED ORAL 2 TIMES DAILY
Qty: 10 TABLET | Refills: 0 | Status: SHIPPED | OUTPATIENT
Start: 2024-08-13

## 2024-08-13 RX ORDER — POLYETHYLENE GLYCOL 3350 17 G/17G
1 POWDER, FOR SOLUTION ORAL DAILY
Qty: 527 G | Refills: 0 | Status: SHIPPED | OUTPATIENT
Start: 2024-08-13

## 2024-08-13 RX ORDER — IBUPROFEN 600 MG/1
600 TABLET, FILM COATED ORAL EVERY 6 HOURS PRN
Qty: 30 TABLET | Refills: 0 | Status: SHIPPED | OUTPATIENT
Start: 2024-08-13

## 2024-08-13 RX ADMIN — ACETAMINOPHEN 650 MG: 325 TABLET, FILM COATED ORAL at 02:25

## 2024-08-13 RX ADMIN — PIPERACILLIN AND TAZOBACTAM 3.38 G: 3; .375 INJECTION, POWDER, FOR SOLUTION INTRAVENOUS at 05:52

## 2024-08-13 RX ADMIN — IRBESARTAN 300 MG: 300 TABLET ORAL at 09:02

## 2024-08-13 RX ADMIN — POLYETHYLENE GLYCOL 3350 17 G: 17 POWDER, FOR SOLUTION ORAL at 09:02

## 2024-08-13 RX ADMIN — KETOROLAC TROMETHAMINE 30 MG: 30 INJECTION, SOLUTION INTRAMUSCULAR at 05:52

## 2024-08-13 RX ADMIN — ACETAMINOPHEN 650 MG: 325 TABLET, FILM COATED ORAL at 09:02

## 2024-08-13 ASSESSMENT — ACTIVITIES OF DAILY LIVING (ADL)
TOILETING_ISSUES: NO
CONCENTRATING,_REMEMBERING_OR_MAKING_DECISIONS_DIFFICULTY: NO
ADLS_ACUITY_SCORE: 25
DOING_ERRANDS_INDEPENDENTLY_DIFFICULTY: NO
ADLS_ACUITY_SCORE: 25
FALL_HISTORY_WITHIN_LAST_SIX_MONTHS: NO
ADLS_ACUITY_SCORE: 25
DIFFICULTY_EATING/SWALLOWING: NO
ADLS_ACUITY_SCORE: 25
CHANGE_IN_FUNCTIONAL_STATUS_SINCE_ONSET_OF_CURRENT_ILLNESS/INJURY: NO
VISION_MANAGEMENT: READING GLASSES
ADLS_ACUITY_SCORE: 25
WEAR_GLASSES_OR_BLIND: YES
HEARING_DIFFICULTY_OR_DEAF: NO
WALKING_OR_CLIMBING_STAIRS_DIFFICULTY: NO
DRESSING/BATHING_DIFFICULTY: NO
DIFFICULTY_COMMUNICATING: NO
ADLS_ACUITY_SCORE: 25

## 2024-08-13 NOTE — PROGRESS NOTES
UROGYNECOLOGY    POST-OP DAY #1    S: Doing well overall. Nausea and emesis x1 overnight. Denies N/V this AM.  Ambulating: In room  Diet: Ordered breakfast  Flatus: Minimal flatus  Pain control: Well controlled  Vaginal Pack: In place  Butler catheter: In place    O:  Vitals: /85 (BP Location: Right arm, Patient Position: Semi-Farfan's, Cuff Size: Adult Regular)   Pulse 70   Temp 98.1  F (36.7  C) (Oral)   Resp 16   Wt 72.9 kg (160 lb 12.8 oz)   LMP 12/01/2019 (Exact Date)   SpO2 97%   BMI 25.95 kg/m    BMI= Body mass index is 25.95 kg/m .      Intake/Output Summary (Last 24 hours) at 8/13/2024 0742  Last data filed at 8/13/2024 0500  Gross per 24 hour   Intake 2200 ml   Output 1300 ml   Net 900 ml       Exam:  Appears healthy and well, A&O x3  Abdomen is soft, slight bloating, incisions C/D/I, hypoactive BS  Ext SCD, no edema  Butler catheter in place  Vaginal packing in place  no perineal edema, incisions intact.    Labs:  HGB:  pre-op 13.4   Post-op 10.4    Assessment and Plan:  POD# 1  A) Post-Operative Care:  ambulate   ADAT  continue with pain control strategies.  perform voiding trial when able to ambulate without assistance.  I reviewed post-operative instructions and precautions/ written information provided.  Discharge home anticipated today  Follow-up based on findings of voiding trial.    Fatemeh Dawson PA-C

## 2024-08-13 NOTE — DISCHARGE INSTRUCTIONS
[facility]  Prolapse/Pelvic Reconstructive Surgery  Instructions for Caring for yourself after Surgery     How do I manage my pain?   Pain and tenderness should lessen each day. To help keep pain under control, use the following guidelines:  Apply ice packs to your perineum (vaginal and rectal area) for the 1st couple of days.  Take 600 milligrams (mg) of ibuprofen (Advil) every 6 hours for the 1st several days.   Use your prescribed narcotic (hydromorphone) for additional pain relief as needed.  Do not drive, drink alcohol or make any major decisions, such as signing important papers or managing legal issues, while taking prescription pain medication.   Take pain medication with food to avoid an upset stomach.    How do I care for my perineum?  Use pads for vaginal discharge after surgery. Discharge is normal and can last several weeks. Discharge may appear bloody, yellow or white.  Do not place anything in your vagina until advised by your doctor.     What about bathing?     Do not take a tub bath, use a hot tub or swim until advised by your doctor. You may take showers.    What about bowel and bladder management?  Keep stools soft and regular. We recommend using the following medicine that loosens stools and increases bowel movements:  MiraLAX-  17 grams or a capful daily following surgery.    When urinating, do not bear down. Relax and allow the bladder muscle to contract. If you are unable to urinate, contact your doctor.  If you go home with a catheter, your doctor may prescribe an antibiotic for you to take before bed to help prevent infection. Follow up in the clinic as instructed to have the catheter removed.    What about activity?  Do not lift more than 10 pounds for 6 weeks after surgery. Avoid heavy pushing or pulling, such as vacuuming or lawn mowing. Your body s tissues need time to heal and regain maximum strength.  Keep Active. Walking is encouraged. Gradually build up how long and far you  walk. Climbing stairs is OK if able.      You may resume driving when you are no longer taking narcotic pain medication and have the strength to use the brake pedal as needed.     When do I call my doctor?  Call Dr. Ojeda call [phone] if you have:     (for urgent questions/concerns CELL PHONE [phone])  -Any post-operative questions or concerns   -A fever over 100.4 F (38 C)    -Difficulty emptying your bladder  -Chills       -Worsening pain              -Nausea or vomiting

## 2024-08-13 NOTE — PROVIDER NOTIFICATION
08/12/24 2000   Provider Notification   Provider Name/Title Dr. Ojeda   Method of Notification Phone   Request Evaluate-Remote   Notification Reason Medication Request     Pt complaining of gas pain. Md ok with simethicone 80mg. MD would also like Promethazine 25mg q6h PRN to help with pain and sleeping.

## 2024-08-13 NOTE — PLAN OF CARE
Goal Outcome Evaluation:      Plan of Care Reviewed With: patient, spouse    Overall Patient Progress: improvingOverall Patient Progress: improving    Outcome Evaluation: Stable for DC. Passed void trial.    A&O x4. VSS on RA. Denies pain. Regular diet, fair appetite. LS clear. BS hypoactive. Butler removed. Void trial passed-see progress note to notify MD.     Discharge instructions completed.  Patient states she understands all discharge instructions and all her questions have been answered.  Verbalizes when she needs to return to clinic for follow up.  Prescriptions reviewed and sent to pharmacy-antibiotic rx held d/t void trial passed.  Patient discharged home with family transporting at 1100.

## 2024-08-14 LAB
PATH REPORT.COMMENTS IMP SPEC: NORMAL
PATH REPORT.COMMENTS IMP SPEC: NORMAL
PATH REPORT.FINAL DX SPEC: NORMAL
PATH REPORT.GROSS SPEC: NORMAL
PATH REPORT.MICROSCOPIC SPEC OTHER STN: NORMAL
PATH REPORT.RELEVANT HX SPEC: NORMAL
PHOTO IMAGE: NORMAL

## 2024-08-19 DIAGNOSIS — E66.09 CLASS 1 OBESITY DUE TO EXCESS CALORIES WITH SERIOUS COMORBIDITY AND BODY MASS INDEX (BMI) OF 32.0 TO 32.9 IN ADULT: ICD-10-CM

## 2024-08-19 DIAGNOSIS — E66.811 CLASS 1 OBESITY DUE TO EXCESS CALORIES WITH SERIOUS COMORBIDITY AND BODY MASS INDEX (BMI) OF 32.0 TO 32.9 IN ADULT: ICD-10-CM

## 2024-08-19 DIAGNOSIS — E11.9 TYPE 2 DIABETES MELLITUS WITHOUT COMPLICATION, WITHOUT LONG-TERM CURRENT USE OF INSULIN (H): ICD-10-CM

## 2024-08-19 RX ORDER — PROCHLORPERAZINE 25 MG/1
SUPPOSITORY RECTAL
Qty: 1 EACH | Refills: 0 | Status: SHIPPED | OUTPATIENT
Start: 2024-08-19

## 2024-09-11 NOTE — ADDENDUM NOTE
Addended by: GARCIA LOPEZ on: 11/5/2023 01:14 PM     Modules accepted: Orders     [de-identified] : Is a pleasant 59-year-old woman is here with her daughter today who provides translation at her request.  She continues to have pain and difficulties with flexion of the knee.  She been doing therapy.  She denies fevers or chills.  She has been getting some pinching type pains as well anteriorly both medially and laterally.  Mild swelling of the knee are mild as well.  She is not using any assistive devices.

## 2024-09-20 ENCOUNTER — MYC MEDICAL ADVICE (OUTPATIENT)
Dept: FAMILY MEDICINE | Facility: CLINIC | Age: 54
End: 2024-09-20
Payer: COMMERCIAL

## 2024-09-20 DIAGNOSIS — E11.9 TYPE 2 DIABETES MELLITUS WITHOUT COMPLICATION, WITHOUT LONG-TERM CURRENT USE OF INSULIN (H): Primary | ICD-10-CM

## 2024-09-20 NOTE — TELEPHONE ENCOUNTER
See my chart - advised visit needed and to call triage for more thorough assessment vs what was sent in my chart     Recent abdominal surgery, constipation - likely visit needed in person for evaluation     Ana Laura Jackson, Registered Nurse  [facility]

## 2024-11-06 DIAGNOSIS — E11.9 TYPE 2 DIABETES MELLITUS WITHOUT COMPLICATION, WITHOUT LONG-TERM CURRENT USE OF INSULIN (H): ICD-10-CM

## 2024-11-06 RX ORDER — PROCHLORPERAZINE 25 MG/1
SUPPOSITORY RECTAL
Qty: 9 EACH | Refills: 4 | Status: SHIPPED | OUTPATIENT
Start: 2024-11-06

## 2024-11-07 ENCOUNTER — PATIENT OUTREACH (OUTPATIENT)
Dept: FAMILY MEDICINE | Facility: CLINIC | Age: 54
End: 2024-11-07
Payer: COMMERCIAL

## 2024-11-07 NOTE — TELEPHONE ENCOUNTER
Patient is on the fail list for [facility] because there is no documentation stating why they are not on a statin. Statin documentation must include the following:    Statin Contraindications and Exceptions:    Active liver disease (liver failure, cirrhosis, hepatitis)    Rhabdomyolysis    End stage renal disease on dialysis    Heart Failure    Allergy to statin    Drug interactions (valid = HIV protease inhibitors, nefazodone, cyclosporine, gemfibrozil and danazol  Medical Exception Documentation New to 2024:   ** Statin and Aspirin Not prescribed orders are no longer acceptable ways to document the reason the patient is not on a Statin/ Aspirin.   ** Starting in January 2024, past medical conditions that are contraindications for Statin/ Aspirin need to be added to the problem list (if not currently an active condition, resolve from the problem list).   ** If there is an allergy to statin/ ASA, place in the patient's allergy component in the medical record.

## 2024-11-07 NOTE — TELEPHONE ENCOUNTER
Patient Quality Outreach    Patient is due for the following:   Diabetes -  Statin    Next Steps:   Routed to provider for review    Type of outreach:    Chart review performed, no outreach needed.    Questions for provider review:    None           Rekha Carlin CMA

## 2024-11-20 DIAGNOSIS — E66.09 CLASS 1 OBESITY DUE TO EXCESS CALORIES WITH SERIOUS COMORBIDITY AND BODY MASS INDEX (BMI) OF 32.0 TO 32.9 IN ADULT: ICD-10-CM

## 2024-11-20 DIAGNOSIS — E66.811 CLASS 1 OBESITY DUE TO EXCESS CALORIES WITH SERIOUS COMORBIDITY AND BODY MASS INDEX (BMI) OF 32.0 TO 32.9 IN ADULT: ICD-10-CM

## 2024-11-20 DIAGNOSIS — E11.9 TYPE 2 DIABETES MELLITUS WITHOUT COMPLICATION, WITHOUT LONG-TERM CURRENT USE OF INSULIN (H): ICD-10-CM

## 2024-11-20 RX ORDER — PROCHLORPERAZINE 25 MG/1
SUPPOSITORY RECTAL
Qty: 1 EACH | Refills: 1 | Status: SHIPPED | OUTPATIENT
Start: 2024-11-20

## 2024-11-25 ENCOUNTER — MYC MEDICAL ADVICE (OUTPATIENT)
Dept: FAMILY MEDICINE | Facility: CLINIC | Age: 54
End: 2024-11-25
Payer: COMMERCIAL

## 2024-12-17 DIAGNOSIS — E11.9 TYPE 2 DIABETES MELLITUS WITHOUT COMPLICATION, WITHOUT LONG-TERM CURRENT USE OF INSULIN (H): ICD-10-CM

## 2024-12-17 RX ORDER — TIRZEPATIDE 7.5 MG/.5ML
INJECTION, SOLUTION SUBCUTANEOUS
Qty: 2 ML | Refills: 1 | Status: SHIPPED | OUTPATIENT
Start: 2024-12-17

## 2024-12-22 ENCOUNTER — HEALTH MAINTENANCE LETTER (OUTPATIENT)
Age: 54
End: 2024-12-22

## 2025-02-10 DIAGNOSIS — E11.9 TYPE 2 DIABETES MELLITUS WITHOUT COMPLICATION, WITHOUT LONG-TERM CURRENT USE OF INSULIN (H): ICD-10-CM

## 2025-02-10 RX ORDER — TIRZEPATIDE 7.5 MG/.5ML
INJECTION, SOLUTION SUBCUTANEOUS
Qty: 2 ML | Refills: 0 | Status: SHIPPED | OUTPATIENT
Start: 2025-02-10

## 2025-02-25 ASSESSMENT — ASTHMA QUESTIONNAIRES
ACT_TOTALSCORE: 25
QUESTION_1 LAST FOUR WEEKS HOW MUCH OF THE TIME DID YOUR ASTHMA KEEP YOU FROM GETTING AS MUCH DONE AT WORK, SCHOOL OR AT HOME: NONE OF THE TIME
ACT_TOTALSCORE: 25
QUESTION_3 LAST FOUR WEEKS HOW OFTEN DID YOUR ASTHMA SYMPTOMS (WHEEZING, COUGHING, SHORTNESS OF BREATH, CHEST TIGHTNESS OR PAIN) WAKE YOU UP AT NIGHT OR EARLIER THAN USUAL IN THE MORNING: NOT AT ALL
QUESTION_4 LAST FOUR WEEKS HOW OFTEN HAVE YOU USED YOUR RESCUE INHALER OR NEBULIZER MEDICATION (SUCH AS ALBUTEROL): NOT AT ALL
QUESTION_2 LAST FOUR WEEKS HOW OFTEN HAVE YOU HAD SHORTNESS OF BREATH: NOT AT ALL
QUESTION_5 LAST FOUR WEEKS HOW WOULD YOU RATE YOUR ASTHMA CONTROL: COMPLETELY CONTROLLED

## 2025-02-26 ENCOUNTER — TRANSFERRED RECORDS (OUTPATIENT)
Dept: MULTI SPECIALTY CLINIC | Facility: CLINIC | Age: 55
End: 2025-02-26

## 2025-02-26 ENCOUNTER — OFFICE VISIT (OUTPATIENT)
Dept: FAMILY MEDICINE | Facility: CLINIC | Age: 55
End: 2025-02-26
Payer: COMMERCIAL

## 2025-02-26 VITALS
TEMPERATURE: 97.9 F | OXYGEN SATURATION: 100 % | DIASTOLIC BLOOD PRESSURE: 77 MMHG | BODY MASS INDEX: 24.27 KG/M2 | HEART RATE: 70 BPM | SYSTOLIC BLOOD PRESSURE: 123 MMHG | HEIGHT: 66 IN | WEIGHT: 151 LBS | RESPIRATION RATE: 14 BRPM

## 2025-02-26 DIAGNOSIS — R11.2 NAUSEA AND VOMITING, UNSPECIFIED VOMITING TYPE: ICD-10-CM

## 2025-02-26 DIAGNOSIS — I10 BENIGN ESSENTIAL HYPERTENSION: Primary | ICD-10-CM

## 2025-02-26 DIAGNOSIS — E11.9 TYPE 2 DIABETES MELLITUS WITHOUT COMPLICATION, WITHOUT LONG-TERM CURRENT USE OF INSULIN (H): ICD-10-CM

## 2025-02-26 LAB
ALT SERPL W P-5'-P-CCNC: 14 U/L (ref 0–50)
ANION GAP SERPL CALCULATED.3IONS-SCNC: 11 MMOL/L (ref 7–15)
BUN SERPL-MCNC: 13.8 MG/DL (ref 6–20)
CALCIUM SERPL-MCNC: 10 MG/DL (ref 8.8–10.4)
CHLORIDE SERPL-SCNC: 105 MMOL/L (ref 98–107)
CHOLEST SERPL-MCNC: 218 MG/DL
CREAT SERPL-MCNC: 0.71 MG/DL (ref 0.51–0.95)
CREAT UR-MCNC: 65.2 MG/DL
EGFRCR SERPLBLD CKD-EPI 2021: >90 ML/MIN/1.73M2
EST. AVERAGE GLUCOSE BLD GHB EST-MCNC: 97 MG/DL
FASTING STATUS PATIENT QL REPORTED: YES
FASTING STATUS PATIENT QL REPORTED: YES
GLUCOSE SERPL-MCNC: 93 MG/DL (ref 70–99)
HBA1C MFR BLD: 5 % (ref 0–5.6)
HCO3 SERPL-SCNC: 23 MMOL/L (ref 22–29)
HDLC SERPL-MCNC: 72 MG/DL
LDLC SERPL CALC-MCNC: 123 MG/DL
MICROALBUMIN UR-MCNC: 14 MG/L
MICROALBUMIN/CREAT UR: 21.47 MG/G CR (ref 0–25)
NONHDLC SERPL-MCNC: 146 MG/DL
POTASSIUM SERPL-SCNC: 5.1 MMOL/L (ref 3.4–5.3)
RETINOPATHY: NORMAL
SODIUM SERPL-SCNC: 139 MMOL/L (ref 135–145)
TRIGL SERPL-MCNC: 115 MG/DL
TSH SERPL DL<=0.005 MIU/L-ACNC: 0.81 UIU/ML (ref 0.3–4.2)

## 2025-02-26 PROCEDURE — 83036 HEMOGLOBIN GLYCOSYLATED A1C: CPT | Performed by: FAMILY MEDICINE

## 2025-02-26 PROCEDURE — 80061 LIPID PANEL: CPT | Performed by: FAMILY MEDICINE

## 2025-02-26 PROCEDURE — 84460 ALANINE AMINO (ALT) (SGPT): CPT | Performed by: FAMILY MEDICINE

## 2025-02-26 PROCEDURE — 3078F DIAST BP <80 MM HG: CPT | Performed by: FAMILY MEDICINE

## 2025-02-26 PROCEDURE — 3074F SYST BP LT 130 MM HG: CPT | Performed by: FAMILY MEDICINE

## 2025-02-26 PROCEDURE — 80048 BASIC METABOLIC PNL TOTAL CA: CPT | Performed by: FAMILY MEDICINE

## 2025-02-26 PROCEDURE — 82570 ASSAY OF URINE CREATININE: CPT | Performed by: FAMILY MEDICINE

## 2025-02-26 PROCEDURE — 36415 COLL VENOUS BLD VENIPUNCTURE: CPT | Performed by: FAMILY MEDICINE

## 2025-02-26 PROCEDURE — 84443 ASSAY THYROID STIM HORMONE: CPT | Performed by: FAMILY MEDICINE

## 2025-02-26 PROCEDURE — 99214 OFFICE O/P EST MOD 30 MIN: CPT | Performed by: FAMILY MEDICINE

## 2025-02-26 PROCEDURE — 82043 UR ALBUMIN QUANTITATIVE: CPT | Performed by: FAMILY MEDICINE

## 2025-02-26 RX ORDER — ONDANSETRON 4 MG/1
4 TABLET, ORALLY DISINTEGRATING ORAL EVERY 8 HOURS PRN
Qty: 12 TABLET | Refills: 3 | Status: SHIPPED | OUTPATIENT
Start: 2025-02-26

## 2025-02-26 RX ORDER — ROSUVASTATIN CALCIUM 10 MG/1
10 TABLET, COATED ORAL DAILY
Qty: 90 TABLET | Refills: 1 | Status: SHIPPED | OUTPATIENT
Start: 2025-02-26

## 2025-02-26 RX ORDER — TIRZEPATIDE 7.5 MG/.5ML
7.5 INJECTION, SOLUTION SUBCUTANEOUS WEEKLY
Qty: 2 ML | Refills: 5 | Status: SHIPPED | OUTPATIENT
Start: 2025-02-26

## 2025-02-26 NOTE — PROGRESS NOTES
Assessment & Plan     Benign essential hypertension  under good control  Continue    - BASIC METABOLIC PANEL  - BASIC METABOLIC PANEL    Nausea and vomiting, unspecified vomiting type  Uses prn  Refills per epicare    - ondansetron (ZOFRAN ODT) 4 MG ODT tab  Dispense: 12 tablet; Refill: 3    Type 2 diabetes mellitus without complication, without long-term current use of insulin (H)  under good control  At some point may be able to go down to 5 mg per week but will see how A1c is today  Also added statin   The potential side effects of the prescription medication were discussed with the patient.  Recheck labs in 6 months     - Lipid panel reflex to direct LDL Fasting  - HEMOGLOBIN A1C  - ALT  - REVIEW OF HEALTH MAINTENANCE PROTOCOL ORDERS  - Tirzepatide (MOUNJARO) 7.5 MG/0.5ML SOAJ  Dispense: 2 mL; Refill: 5  - Albumin Random Urine Quantitative with Creat Ratio  - TSH with free T4 reflex  - rosuvastatin (CRESTOR) 10 MG tablet  Dispense: 90 tablet; Refill: 1  - Lipid panel reflex to direct LDL Fasting  - HEMOGLOBIN A1C  - ALT  - Albumin Random Urine Quantitative with Creat Ratio  - TSH with free T4 reflex              FUTURE APPOINTMENTS:       - Follow-up visit in 6 months   See Patient Instructions    Ministerio Bello is a 55 year old, presenting for the following health issues:  she is here for recheck on diabetes and blood pressure.   Her weight has been stable and she is tolerating her medication with no side effects.        Recheck Medication      2/26/2025     8:02 AM   Additional Questions   Roomed by Fern     History of Present Illness       Diabetes:   She presents for follow up of diabetes.   She is checking home blood glucose with a continuous glucose monitor.   She checks blood glucose before and after meals and at bedtime.  Blood glucose is sometimes over 200 and sometimes under 70. She is aware of hypoglycemia symptoms including dizziness and weakness.    She has no concerns regarding her diabetes  at this time.   She is not experiencing numbness or burning in feet, excessive thirst, blurry vision, weight changes or redness, sores or blisters on feet. The patient has had a diabetic eye exam in the last 12 months. Eye exam performed on 2/26/25. Location of last eye exam Dr Josesito Lara.        Hypertension: She presents for follow up of hypertension.  She does check blood pressure  regularly outside of the clinic. Outpatient blood pressures have not been over 140/90. She does not follow a low salt diet.     She eats 2-3 servings of fruits and vegetables daily.She consumes 0 sweetened beverage(s) daily.She exercises with enough effort to increase her heart rate 30 to 60 minutes per day.  She exercises with enough effort to increase her heart rate 5 days per week.   She is taking medications regularly.       Past Medical History:   Diagnosis Date    Benign heart murmur     since childhood    Class 1 obesity due to excess calories with serious comorbidity and body mass index (BMI) of 32.0 to 32.9 in adult     Complication of anesthesia     Diabetes mellitus, type 2 (H) 04/05/2023    Hypertension     Intermittent asthma     Migraines     PONV (postoperative nausea and vomiting)        Past Surgical History:   Procedure Laterality Date    COLONOSCOPY  03/2024    ((Pt Qnr Sub: done on april 2024)) 10 yrs    CYSTOSCOPY, SLING TRANSVAGINAL N/A 8/12/2024    Procedure: Cystourethroscopy, retropubic midurethral sling;  Surgeon: Taryn Ojeda MD;  Location: [facility] OR    D & C  01/01/1993    after miscarriage    DAVINCI HERNIORRHAPHY UMBILICAL N/A 8/12/2024    Procedure: Umbilical hernia repair;  Surgeon: Taryn Ojeda MD;  Location: [facility] OR    DAVINCI LYSIS OF ADHESIONS N/A 8/12/2024    Procedure: Da Lucía laparoscopic lysis of adhesions, extensive 120 minutes;  Surgeon: Taryn Ojeda MD;  Location: [facility] OR    HYSTERECTOMY, SUPRACERVICAL, ROBOT-ASSISTED, DA LUCÍA XI, W/EMELIA, SACROCOLPOPEXY, PARAVAG &  POST RPR Bilateral 8/12/2024    Procedure: Da Lucía laparoscopic sacral colpopexy, Da Lucía laparoscopic supracervical hysterectomy, Da Lucía laparoscopic bilateral salpingectomy, Da Lucía laparoscopic abdominal anterior, posterior and  enterocele repairs,;  Surgeon: Taryn Ojeda MD;  Location: [facility] OR       MEDICATIONS:  Current Outpatient Medications   Medication Sig Dispense Refill    alcohol swab prep pads Use to swab area of injection/joe as directed. 100 each 3    Continuous Glucose Sensor (DEXCOM G6 SENSOR) MISC CHANGE EVERY 10 DAYS` 9 each 4    Continuous Glucose Transmitter (DEXCOM G6 TRANSMITTER) MISC CHANGE EVERY 3 MONTHS. 1 each 1    estradiol (ESTRACE) 0.1 MG/GM vaginal cream Place 1 g vaginally three times a week      irbesartan (AVAPRO) 300 MG tablet TAKE 1 TABLET(300 MG) BY MOUTH DAILY 90 tablet 3    ondansetron (ZOFRAN ODT) 4 MG ODT tab Take 1 tablet (4 mg) by mouth every 8 hours as needed for nausea or vomiting. 12 tablet 3    rosuvastatin (CRESTOR) 10 MG tablet Take 1 tablet (10 mg) by mouth daily. 90 tablet 1    Tirzepatide (MOUNJARO) 7.5 MG/0.5ML SOAJ Inject 0.5 mLs (7.5 mg) subcutaneously once a week. 2 mL 5     No current facility-administered medications for this visit.       SOCIAL HISTORY:  Social History     Tobacco Use    Smoking status: Never    Smokeless tobacco: Never   Substance Use Topics    Alcohol use: Yes     Comment: Very rare       Family History   Problem Relation Age of Onset    Cancer Mother         CLL--Chronic lymphatic luekemia    Diabetes Mother     Obesity Mother     C.A.D. Father         MI at  ~40yrs old    Melanoma Father         stage 4 - did trial and he is in remission    Diabetes Father     Diabetes Sister     Hypertension Sister     Obesity Sister     Depression Sister     Anxiety Disorder Sister     Obesity Sister     Cancer Maternal Grandmother         breast cancer    Breast Cancer Maternal Grandmother     Heart Disease Paternal Grandfather      "Breast Cancer Maternal Aunt         3 aunts    Breast Cancer Other                Review of Systems  Constitutional, HEENT, cardiovascular, pulmonary, gi and gu systems are negative, except as otherwise noted.      Objective    /77 (BP Location: Left arm, Patient Position: Chair, Cuff Size: Adult Regular)   Pulse 70   Temp 97.9  F (36.6  C) (Oral)   Resp 14   Ht 1.676 m (5' 6\")   Wt 68.5 kg (151 lb)   LMP 12/01/2019 (Exact Date)   SpO2 100%   BMI 24.37 kg/m    Body mass index is 24.37 kg/m .  Physical Exam   GENERAL: alert and no distress  EYES: Eyes grossly normal to inspection, PERRL and conjunctivae and sclerae normal  HENT: ear canals and TM's normal, nose and mouth without ulcers or lesions  NECK: no adenopathy, no asymmetry, masses, or scars  RESP: lungs clear to auscultation - no rales, rhonchi or wheezes  CV: regular rate and rhythm, normal S1 S2, no S3 or S4, no murmur, click or rub, no peripheral edema  ABDOMEN: soft, nontender, no hepatosplenomegaly, no masses and bowel sounds normal  MS: no gross musculoskeletal defects noted, no edema  SKIN: no suspicious lesions or rashes  NEURO: Normal strength and tone, mentation intact and speech normal  PSYCH: mentation appears normal, affect normal/bright    Admission on 08/12/2024, Discharged on 08/13/2024   Component Date Value Ref Range Status    GLUCOSE BY METER POCT 08/12/2024 93  70 - 99 mg/dL Final    Dr/RN Notified    Case Report 08/12/2024    Final                    Value:Surgical Pathology Report                         Case: [id number]                                  Authorizing Provider:  Taryn Ojeda MD  Collected:           08/12/2024 11:31 AM          Ordering Location:     [facility]   Received:            08/12/2024 12:36 PM                                 Main OR                                                                      Pathologist:           Diana Johnson MD                                       "                       Specimens:   A) - Uterus, Bilateral Fallopian Tubes, Uterus and bilateral fallopian tubes                        B) - Hernia Sac, Umbilical, Umbilical Hernia Sac                                           Final Diagnosis 08/12/2024    Final                    Value:A.  Uterus and bilateral fallopian tubes; supracervical hysterectomy with                           bilateral salpingectomy:                          -Endometrium:   Weakly proliferative/inactive and benign endometrial                           polyp.                          -Myometrium:   Leiomyomas and lipoleiomyoma.                          -Serosa:   No specific histopathologic abnormality.                          -Bilateral tubes:   Benign paratubal cysts.                                                    B.  Umbilical hernia sac, repair:                          -Fibrofatty tissue consistent with hernia sac.                              Clinical Information 08/12/2024    Final                    Value:Procedure:                          Robotic Laparoscopic Sacral Colpopexy, Robotic Laparoscopic Supracervical                           Hysterectomy with Bilateral Salpingectomy, Robotic Laparoscopic Abdominal                           Enterocele Repair, Lysis of Adhesions, Umbilical Hernia Repair - Bilateral                          Cystoscopy, Midurethral Sling                          Pre-op Diagnosis: Uterovaginal prolapse [N81.4]                          Enterocele [K46.9]                          Female stress incontinence [N39.3]                          Post-op Diagnosis: N81.4 - Uterovaginal prolapse [ICD-10-CM]                          K46.9 - Enterocele [ICD-10-CM]                          N39.3 - Female stress incontinence [ICD-10-CM]    Gross Description 08/12/2024    Final                    Value:A(1). Uterus, Bilateral Fallopian Tubes, Uterus and bilateral fallopian                           tubes:                 "          The specimen is received in formalin, labeled with the patient's name,                           medical record number and other identifying information designated \"uterus                           and bilateral fallopian tubes\". It consists of a 65.4 g, 6.1 x 5.1 x 2.8                           cm uterus with attached left fallopian tube and detached right fallopian                           tube.  No cervix is present.  The cervix amputation site is inked black.                            The serosa is tan-pink, smooth, and slightly erythematous.  The right                           fallopian tube is differentially inked blue.  The 3.0 x 2.9 cm endometrial                           cavity contains tan-pink, smooth endometrium with a maximum thickness of                           0.1 cm and contains a 0.5 x 0.4 x 0.2 cm tan-pink, smooth, exophytic                           possible polyp within the posterior aspect.  The myometrium is tan-pink,                           slightly trabeculated with a maximum thickness of 2.2 cm and contains 2                           tan-white, solid, whorled, bulging, well-circumscribed nodules, 0.5-0.6                           cm.  No hemorrhage, necrosis, or calcifications are identified within the                           nodules.  The 6.6 x 0.6 cm right fallopian tube and 6.9 x 0.6 cm left                           fallopian tube are tan-pink, fimbriated and each contain multiple (>10)                           paratubal cysts, 0.1-0.6 cm.   The tube cut surfaces are tan-pink with                           stellate lumina.                                                    Representative sections submitted as follows:                          A1-A2-full-thickness anterior endomyometrium to include larger nodule in                           A2                          A3-A4-full-thickness posterior endomyometrium to include entire possible                           " "polyp in A3 and smaller nodule in A4                          A5-right fallopian tube to include paratubal cysts (entire fimbria)                          A6-left fallopian tube to include paratubal cysts (entire fimbria)                                                    B(2). Hernia Sac, Umbilical, Umbilical Hernia Sac:                          The specimen is received in formalin, labeled with the patient's name,                           medical record number and other identifying information designated                           \"umbilical hernia sac\". It consists of a 2.6 cm aggregate of tan-yellow,                           lobulated adipose tissue with minimal intervening tan-pink fibrous tissue.                            No tumor is identified.  The specimen is submitted entirely in 1                           cassette.                           (DEMARCO Og)8/12/2024 1:05 PM     Microscopic Description 08/12/2024    Final                    Value:A.-B.  Microscopic examination was performed.  Selected blocks from part A                           (A2-A4) were examined at multiple levels to confirm the final diagnosis.                            No atypia or evidence for malignancy is identified.                              Performing Labs 08/12/2024    Final                    Value:The technical component of this testing was completed at [facility].                                                    Stain controls for all stains resulted within this report have been                           reviewed and show appropriate reactivity.     GLUCOSE BY METER POCT 08/12/2024 126 (H)  70 - 99 mg/dL Final    GLUCOSE BY METER POCT 08/12/2024 126 (H)  70 - 99 mg/dL Final    GLUCOSE BY METER POCT 08/12/2024 124 (H)  70 - 99 mg/dL Final    Hemoglobin 08/13/2024 10.4 (L)  11.7 - 15.7 g/dL Final    Creatinine 08/13/2024 1.02 " (H)  0.51 - 0.95 mg/dL Final    GFR Estimate 08/13/2024 64  >60 mL/min/1.73m2 Final    eGFR calculated using 2021 CKD-EPI equation.    Glucose 08/13/2024 118 (H)  70 - 99 mg/dL Final           Signed Electronically by: Bianca Nathan MD

## 2025-04-28 ENCOUNTER — HOSPITAL ENCOUNTER (OUTPATIENT)
Dept: MAMMOGRAPHY | Facility: CLINIC | Age: 55
Discharge: HOME | End: 2025-04-28
Attending: FAMILY MEDICINE | Admitting: FAMILY MEDICINE
Payer: COMMERCIAL

## 2025-04-28 DIAGNOSIS — Z12.31 VISIT FOR SCREENING MAMMOGRAM: ICD-10-CM

## 2025-04-28 PROCEDURE — 77063 BREAST TOMOSYNTHESIS BI: CPT

## 2025-04-29 ENCOUNTER — MYC MEDICAL ADVICE (OUTPATIENT)
Dept: FAMILY MEDICINE | Facility: CLINIC | Age: 55
End: 2025-04-29
Payer: COMMERCIAL

## 2025-04-29 ENCOUNTER — E-VISIT (OUTPATIENT)
Dept: FAMILY MEDICINE | Facility: CLINIC | Age: 55
End: 2025-04-29
Payer: COMMERCIAL

## 2025-04-29 DIAGNOSIS — E11.9 TYPE 2 DIABETES MELLITUS WITHOUT COMPLICATION, WITHOUT LONG-TERM CURRENT USE OF INSULIN (H): Primary | ICD-10-CM

## 2025-04-29 NOTE — TELEPHONE ENCOUNTER
See my chart - Evisit advised to discuss increasing Tyler Jackson, Registered Nurse  [facility]

## 2025-04-29 NOTE — PATIENT INSTRUCTIONS
Thank you for checking in. I have adjusted your medication to manage your symptoms. The following medication was sent to your pharmacy:  No orders of the defined types were placed in this encounter.       View your full visit summary for details by clicking on the link below. Your pharmacist will be able to address any questions you may have about the medication.       Please schedule a visit for med check in August      Thank you for choosing us for your care.

## 2025-05-25 DIAGNOSIS — E11.9 TYPE 2 DIABETES MELLITUS WITHOUT COMPLICATION, WITHOUT LONG-TERM CURRENT USE OF INSULIN (H): ICD-10-CM

## 2025-05-25 DIAGNOSIS — E66.811 CLASS 1 OBESITY DUE TO EXCESS CALORIES WITH SERIOUS COMORBIDITY AND BODY MASS INDEX (BMI) OF 32.0 TO 32.9 IN ADULT: ICD-10-CM

## 2025-05-25 DIAGNOSIS — E66.09 CLASS 1 OBESITY DUE TO EXCESS CALORIES WITH SERIOUS COMORBIDITY AND BODY MASS INDEX (BMI) OF 32.0 TO 32.9 IN ADULT: ICD-10-CM

## 2025-05-27 RX ORDER — PROCHLORPERAZINE 25 MG/1
SUPPOSITORY RECTAL
Qty: 1 EACH | Refills: 1 | Status: SHIPPED | OUTPATIENT
Start: 2025-05-27

## 2025-06-14 ENCOUNTER — HEALTH MAINTENANCE LETTER (OUTPATIENT)
Age: 55
End: 2025-06-14

## 2025-06-28 NOTE — TELEPHONE ENCOUNTER
Prior Authorization Approval    Authorization Effective Date: 1/28/2021  Authorization Expiration Date: 4/28/2022  Medication: Contrave-APPROVED  Approved Dose/Quantity:   Reference #:     Insurance Company: [email or web address] - Phone [phone] Fax [phone]  Expected CoPay:       CoPay Card Available:      Foundation Assistance Needed:    Which Pharmacy is filling the prescription (Not needed for infusion/clinic administered): [id number] [address] [facility] AT [facility] [id number]  Pharmacy Notified: Yes  Patient Notified: No    Pharmacy will notify patient when medication is ready.     no

## 2025-07-30 ENCOUNTER — PATIENT OUTREACH (OUTPATIENT)
Dept: CARE COORDINATION | Facility: CLINIC | Age: 55
End: 2025-07-30
Payer: COMMERCIAL

## 2025-08-18 DIAGNOSIS — E11.9 TYPE 2 DIABETES MELLITUS WITHOUT COMPLICATION, WITHOUT LONG-TERM CURRENT USE OF INSULIN (H): ICD-10-CM

## 2025-08-18 RX ORDER — TIRZEPATIDE 10 MG/.5ML
INJECTION, SOLUTION SUBCUTANEOUS
Qty: 2 ML | Refills: 0 | Status: SHIPPED | OUTPATIENT
Start: 2025-08-18

## 2025-08-22 SDOH — HEALTH STABILITY: PHYSICAL HEALTH: ON AVERAGE, HOW MANY MINUTES DO YOU ENGAGE IN EXERCISE AT THIS LEVEL?: 60 MIN

## 2025-08-22 SDOH — HEALTH STABILITY: PHYSICAL HEALTH: ON AVERAGE, HOW MANY DAYS PER WEEK DO YOU ENGAGE IN MODERATE TO STRENUOUS EXERCISE (LIKE A BRISK WALK)?: 4 DAYS

## 2025-08-22 ASSESSMENT — ASTHMA QUESTIONNAIRES
QUESTION_5 LAST FOUR WEEKS HOW WOULD YOU RATE YOUR ASTHMA CONTROL: COMPLETELY CONTROLLED
QUESTION_2 LAST FOUR WEEKS HOW OFTEN HAVE YOU HAD SHORTNESS OF BREATH: NOT AT ALL
QUESTION_3 LAST FOUR WEEKS HOW OFTEN DID YOUR ASTHMA SYMPTOMS (WHEEZING, COUGHING, SHORTNESS OF BREATH, CHEST TIGHTNESS OR PAIN) WAKE YOU UP AT NIGHT OR EARLIER THAN USUAL IN THE MORNING: NOT AT ALL
QUESTION_4 LAST FOUR WEEKS HOW OFTEN HAVE YOU USED YOUR RESCUE INHALER OR NEBULIZER MEDICATION (SUCH AS ALBUTEROL): NOT AT ALL
ACT_TOTALSCORE: 25
QUESTION_1 LAST FOUR WEEKS HOW MUCH OF THE TIME DID YOUR ASTHMA KEEP YOU FROM GETTING AS MUCH DONE AT WORK, SCHOOL OR AT HOME: NONE OF THE TIME

## 2025-08-27 ENCOUNTER — OFFICE VISIT (OUTPATIENT)
Dept: FAMILY MEDICINE | Facility: CLINIC | Age: 55
End: 2025-08-27
Payer: COMMERCIAL

## 2025-08-27 VITALS
WEIGHT: 151.2 LBS | TEMPERATURE: 98.3 F | BODY MASS INDEX: 24.3 KG/M2 | HEART RATE: 62 BPM | RESPIRATION RATE: 14 BRPM | DIASTOLIC BLOOD PRESSURE: 82 MMHG | SYSTOLIC BLOOD PRESSURE: 129 MMHG | OXYGEN SATURATION: 100 % | HEIGHT: 66 IN

## 2025-08-27 DIAGNOSIS — K80.20 GALLSTONES: ICD-10-CM

## 2025-08-27 DIAGNOSIS — N81.11 CYSTOCELE, MIDLINE: ICD-10-CM

## 2025-08-27 DIAGNOSIS — E11.69 TYPE 2 DIABETES MELLITUS WITH OTHER SPECIFIED COMPLICATION, WITHOUT LONG-TERM CURRENT USE OF INSULIN (H): ICD-10-CM

## 2025-08-27 DIAGNOSIS — E11.9 TYPE 2 DIABETES MELLITUS WITHOUT COMPLICATION, WITHOUT LONG-TERM CURRENT USE OF INSULIN (H): ICD-10-CM

## 2025-08-27 DIAGNOSIS — J45.20 MILD INTERMITTENT ASTHMA WITHOUT COMPLICATION: ICD-10-CM

## 2025-08-27 DIAGNOSIS — Z00.00 ROUTINE GENERAL MEDICAL EXAMINATION AT A HEALTH CARE FACILITY: Primary | ICD-10-CM

## 2025-08-27 DIAGNOSIS — I10 BENIGN ESSENTIAL HYPERTENSION: ICD-10-CM

## 2025-08-27 DIAGNOSIS — N81.6 FEMALE PROCTOCELE WITHOUT UTERINE PROLAPSE: ICD-10-CM

## 2025-08-27 DIAGNOSIS — Z86.39 HISTORY OF OBESITY: ICD-10-CM

## 2025-08-27 LAB
ANION GAP SERPL CALCULATED.3IONS-SCNC: 10 MMOL/L (ref 7–15)
BUN SERPL-MCNC: 13.2 MG/DL (ref 6–20)
CALCIUM SERPL-MCNC: 9.8 MG/DL (ref 8.8–10.4)
CHLORIDE SERPL-SCNC: 103 MMOL/L (ref 98–107)
CREAT SERPL-MCNC: 0.78 MG/DL (ref 0.51–0.95)
EGFRCR SERPLBLD CKD-EPI 2021: 88 ML/MIN/1.73M2
EST. AVERAGE GLUCOSE BLD GHB EST-MCNC: 94 MG/DL
GLUCOSE SERPL-MCNC: 93 MG/DL (ref 70–99)
HBA1C MFR BLD: 4.9 % (ref 0–5.6)
HCO3 SERPL-SCNC: 25 MMOL/L (ref 22–29)
POTASSIUM SERPL-SCNC: 4.5 MMOL/L (ref 3.4–5.3)
SODIUM SERPL-SCNC: 138 MMOL/L (ref 135–145)

## 2025-08-27 PROCEDURE — 3074F SYST BP LT 130 MM HG: CPT | Performed by: FAMILY MEDICINE

## 2025-08-27 PROCEDURE — 99214 OFFICE O/P EST MOD 30 MIN: CPT | Mod: 25 | Performed by: FAMILY MEDICINE

## 2025-08-27 PROCEDURE — 3044F HG A1C LEVEL LT 7.0%: CPT | Performed by: FAMILY MEDICINE

## 2025-08-27 PROCEDURE — 36415 COLL VENOUS BLD VENIPUNCTURE: CPT | Performed by: FAMILY MEDICINE

## 2025-08-27 PROCEDURE — 80048 BASIC METABOLIC PNL TOTAL CA: CPT | Performed by: FAMILY MEDICINE

## 2025-08-27 PROCEDURE — 99207 PR FOOT EXAM NO CHARGE: CPT | Performed by: FAMILY MEDICINE

## 2025-08-27 PROCEDURE — 3079F DIAST BP 80-89 MM HG: CPT | Performed by: FAMILY MEDICINE

## 2025-08-27 PROCEDURE — 83036 HEMOGLOBIN GLYCOSYLATED A1C: CPT | Performed by: FAMILY MEDICINE

## 2025-08-27 PROCEDURE — 99396 PREV VISIT EST AGE 40-64: CPT | Performed by: FAMILY MEDICINE

## 2025-08-27 RX ORDER — PROCHLORPERAZINE 25 MG/1
SUPPOSITORY RECTAL
Qty: 1 EACH | Refills: 1 | Status: SHIPPED | OUTPATIENT
Start: 2025-08-27

## 2025-08-27 RX ORDER — TIRZEPATIDE 10 MG/.5ML
10 INJECTION, SOLUTION SUBCUTANEOUS WEEKLY
Qty: 2 ML | Refills: 5 | Status: SHIPPED | OUTPATIENT
Start: 2025-08-27

## 2025-08-27 RX ORDER — IRBESARTAN 150 MG/1
150 TABLET ORAL DAILY
Qty: 90 TABLET | Refills: 4 | Status: SHIPPED | OUTPATIENT
Start: 2025-08-27

## 2025-08-27 RX ORDER — PROCHLORPERAZINE 25 MG/1
SUPPOSITORY RECTAL
Qty: 9 EACH | Refills: 4 | Status: SHIPPED | OUTPATIENT
Start: 2025-08-27

## 2025-08-27 RX ORDER — ESTRADIOL 0.1 MG/G
1 CREAM VAGINAL
Qty: 42.5 G | Refills: 5 | Status: SHIPPED | OUTPATIENT
Start: 2025-08-27

## 2025-08-27 RX ORDER — ROSUVASTATIN CALCIUM 10 MG/1
10 TABLET, COATED ORAL DAILY
Qty: 90 TABLET | Refills: 4 | Status: SHIPPED | OUTPATIENT
Start: 2025-08-27

## (undated) DEVICE — PAD CHUX UNDERPAD 30X36" P3036C

## (undated) DEVICE — SPONGE RAY-TEC 4X8" 7318

## (undated) DEVICE — PACK MINOR LITHOTOMY RIDGES

## (undated) DEVICE — PROTECTOR ARM ONE-STEP TRENDELENBURG 40418

## (undated) DEVICE — SU PDS II 0 CT-1 36" Z346H

## (undated) DEVICE — BAG CLEAR TRASH 1.3M 39X33" P4040C

## (undated) DEVICE — BLADE CLIPPER 3M 9670

## (undated) DEVICE — DRAPE UNDER BUTTOCK 89415

## (undated) DEVICE — SPONGE PACK VAGINAL 2"X9

## (undated) DEVICE — GLOVE BIOGEL PI MICRO INDICATOR UNDERGLOVE SZ 6.0 48960

## (undated) DEVICE — BAG URIMETER FOLEY KIT W/16FR FOLEY

## (undated) DEVICE — DAVINCI XI SEAL UNIVERSAL 5-12MM 470500

## (undated) DEVICE — SUCTION IRR STRYKERFLOW II W/TIP 250-070-520

## (undated) DEVICE — GLOVE BIOGEL PI MICRO SZ 6.5 48565

## (undated) DEVICE — SOL WATER IRRIG 1000ML BOTTLE 2F7114

## (undated) DEVICE — CATH FOLEY 16FR 5ML LUBRICATH LATEX 0165L16

## (undated) DEVICE — DAVINCI CONMED AIRSEAL BIFURCATRED TUBE SET ASM-EVAC1-BI

## (undated) DEVICE — DAVINCI XI OBTURATOR BLADELESS 8MM 470359

## (undated) DEVICE — PREP SCRUB SOL EXIDINE 4% CHG 4OZ 29002-404

## (undated) DEVICE — SU DERMABOND ADVANCED .7ML DNX12

## (undated) DEVICE — SOL NACL 0.9% IRRIG 1000ML BOTTLE 2F7124

## (undated) DEVICE — UTERINE MANIPULATOR RUMI TIP SACROCOLPOPEXY DST END SACRO-1C

## (undated) DEVICE — DAVINCI XI DRAPE ARM 470015

## (undated) DEVICE — DAVINCI HOT SHEARS TIP COVER  400180

## (undated) DEVICE — LINEN ORTHO ACL PACK 5447

## (undated) DEVICE — SUCTION TIP YANKAUER W/O VENT K86

## (undated) DEVICE — KIT PATIENT POSITIONING PIGAZZI LATEX FREE 40580

## (undated) DEVICE — SUCTION MANIFOLD NEPTUNE 2 SYS 4 PORT 0702-020-000

## (undated) DEVICE — ESU GROUND PAD ADULT W/CORD E7507

## (undated) DEVICE — SU VICRYL 4-0 PS-2 18" UND J496H

## (undated) DEVICE — DAVINCI CONMED AIRSEAL CAP & OBTURATOR BLADELESS 8MM IAS8-DV

## (undated) DEVICE — LINEN FULL SHEET 5511

## (undated) DEVICE — DRAPE VAGI BAG 18X9" 1072

## (undated) DEVICE — TUBING IRRIG TUR Y TYPE 96" LF 6543-01

## (undated) DEVICE — DECANTER VIAL 2006S

## (undated) DEVICE — BLADE KNIFE SURG 11 371111

## (undated) DEVICE — GLOVE BIOGEL PI MICRO INDICATOR UNDERGLOVE SZ 6.5 48965

## (undated) DEVICE — DAVINCI XI DRAPE COLUMN 470341

## (undated) DEVICE — Device

## (undated) DEVICE — SU VICRYL 2-0 CT-2 27" J333H

## (undated) DEVICE — SU PDS II 2-0 CT-2 27"  Z333H

## (undated) DEVICE — ADAPTER DRAPE ALLY AU-AD

## (undated) DEVICE — SU MONOCRYL 0 CT-2 27" Y334H

## (undated) DEVICE — NDL 22GA 1.5"

## (undated) DEVICE — SOL NACL 0.9% INJ 1000ML BAG 2B1324X

## (undated) DEVICE — ANTIFOG SOLUTION SEE SHARP 150M TROCAR SWABS 30978

## (undated) DEVICE — PACK DAVINCI UROLOGY SBA15UDFSG

## (undated) DEVICE — DRAPE MAYO STAND 23X54 8337

## (undated) DEVICE — SUCTION CANISTER MEDIVAC LINER 3000ML W/LID 65651-530

## (undated) DEVICE — LINEN HALF SHEET 5512

## (undated) DEVICE — SU VICRYL 2-0 CT-2 27" UND J269H

## (undated) DEVICE — PAD PERI INDIV WRAP 11" 2022A

## (undated) DEVICE — NDL INSUFFLATION 13GA 120MM C2201

## (undated) DEVICE — SPONGE RAY-TEC 3X3" 30-094

## (undated) RX ORDER — PROPOFOL 10 MG/ML
INJECTION, EMULSION INTRAVENOUS
Status: DISPENSED
Start: 2024-08-12

## (undated) RX ORDER — OXYCODONE HYDROCHLORIDE 5 MG/1
TABLET ORAL
Status: DISPENSED
Start: 2024-08-12

## (undated) RX ORDER — GLYCOPYRROLATE 0.2 MG/ML
INJECTION, SOLUTION INTRAMUSCULAR; INTRAVENOUS
Status: DISPENSED
Start: 2024-08-12

## (undated) RX ORDER — FENTANYL CITRATE 50 UG/ML
INJECTION, SOLUTION INTRAMUSCULAR; INTRAVENOUS
Status: DISPENSED
Start: 2024-08-12

## (undated) RX ORDER — PHENAZOPYRIDINE HYDROCHLORIDE 200 MG/1
TABLET, FILM COATED ORAL
Status: DISPENSED
Start: 2024-08-12

## (undated) RX ORDER — ONDANSETRON 2 MG/ML
INJECTION INTRAMUSCULAR; INTRAVENOUS
Status: DISPENSED
Start: 2024-08-12

## (undated) RX ORDER — SCOLOPAMINE TRANSDERMAL SYSTEM 1 MG/1
PATCH, EXTENDED RELEASE TRANSDERMAL
Status: DISPENSED
Start: 2024-08-12

## (undated) RX ORDER — DEXAMETHASONE SODIUM PHOSPHATE 4 MG/ML
INJECTION, SOLUTION INTRA-ARTICULAR; INTRALESIONAL; INTRAMUSCULAR; INTRAVENOUS; SOFT TISSUE
Status: DISPENSED
Start: 2024-08-12

## (undated) RX ORDER — BUPIVACAINE HYDROCHLORIDE 5 MG/ML
INJECTION, SOLUTION EPIDURAL; INTRACAUDAL
Status: DISPENSED
Start: 2024-08-12

## (undated) RX ORDER — LIDOCAINE HYDROCHLORIDE AND EPINEPHRINE 10; 10 MG/ML; UG/ML
INJECTION, SOLUTION INFILTRATION; PERINEURAL
Status: DISPENSED
Start: 2024-08-12

## (undated) RX ORDER — ACETAMINOPHEN 325 MG/1
TABLET ORAL
Status: DISPENSED
Start: 2024-08-12

## (undated) RX ORDER — LIDOCAINE HYDROCHLORIDE 10 MG/ML
INJECTION, SOLUTION EPIDURAL; INFILTRATION; INTRACAUDAL; PERINEURAL
Status: DISPENSED
Start: 2024-08-12

## (undated) RX ORDER — CEFAZOLIN SODIUM/WATER 2 G/20 ML
SYRINGE (ML) INTRAVENOUS
Status: DISPENSED
Start: 2024-08-12